# Patient Record
Sex: FEMALE | Race: WHITE | NOT HISPANIC OR LATINO | Employment: OTHER | URBAN - METROPOLITAN AREA
[De-identification: names, ages, dates, MRNs, and addresses within clinical notes are randomized per-mention and may not be internally consistent; named-entity substitution may affect disease eponyms.]

---

## 2019-01-18 ENCOUNTER — INITIAL CONSULT (OUTPATIENT)
Dept: SURGERY | Facility: CLINIC | Age: 69
End: 2019-01-18
Payer: COMMERCIAL

## 2019-01-18 ENCOUNTER — LAB VISIT (OUTPATIENT)
Dept: LAB | Facility: HOSPITAL | Age: 69
End: 2019-01-18
Attending: SURGERY
Payer: COMMERCIAL

## 2019-01-18 VITALS
DIASTOLIC BLOOD PRESSURE: 85 MMHG | BODY MASS INDEX: 33.25 KG/M2 | TEMPERATURE: 98 F | SYSTOLIC BLOOD PRESSURE: 184 MMHG | HEIGHT: 63 IN | HEART RATE: 80 BPM | WEIGHT: 187.63 LBS

## 2019-01-18 DIAGNOSIS — C25.9 MALIGNANT NEOPLASM OF PANCREAS, UNSPECIFIED LOCATION OF MALIGNANCY: ICD-10-CM

## 2019-01-18 DIAGNOSIS — K86.89 MASS OF PANCREAS: ICD-10-CM

## 2019-01-18 DIAGNOSIS — I10 ESSENTIAL HYPERTENSION: ICD-10-CM

## 2019-01-18 DIAGNOSIS — C25.9 MALIGNANT NEOPLASM OF PANCREAS, UNSPECIFIED LOCATION OF MALIGNANCY: Primary | ICD-10-CM

## 2019-01-18 LAB
ALBUMIN SERPL BCP-MCNC: 2.4 G/DL
ALP SERPL-CCNC: 608 U/L
ALT SERPL W/O P-5'-P-CCNC: 139 U/L
ANION GAP SERPL CALC-SCNC: 11 MMOL/L
AST SERPL-CCNC: 129 U/L
BASOPHILS # BLD AUTO: 0.07 K/UL
BASOPHILS NFR BLD: 0.9 %
BILIRUB SERPL-MCNC: 12.8 MG/DL
BUN SERPL-MCNC: 10 MG/DL
CALCIUM SERPL-MCNC: 9.6 MG/DL
CANCER AG19-9 SERPL-ACNC: ABNORMAL U/ML
CHLORIDE SERPL-SCNC: 98 MMOL/L
CO2 SERPL-SCNC: 30 MMOL/L
CREAT SERPL-MCNC: 0.8 MG/DL
DIFFERENTIAL METHOD: ABNORMAL
EOSINOPHIL # BLD AUTO: 0.2 K/UL
EOSINOPHIL NFR BLD: 3.1 %
ERYTHROCYTE [DISTWIDTH] IN BLOOD BY AUTOMATED COUNT: 19.9 %
EST. GFR  (AFRICAN AMERICAN): >60 ML/MIN/1.73 M^2
EST. GFR  (NON AFRICAN AMERICAN): >60 ML/MIN/1.73 M^2
GLUCOSE SERPL-MCNC: 116 MG/DL
HCT VFR BLD AUTO: 29.3 %
HGB BLD-MCNC: 9.4 G/DL
IMM GRANULOCYTES # BLD AUTO: 0.06 K/UL
IMM GRANULOCYTES NFR BLD AUTO: 0.8 %
LYMPHOCYTES # BLD AUTO: 1.1 K/UL
LYMPHOCYTES NFR BLD: 14.5 %
MCH RBC QN AUTO: 25.9 PG
MCHC RBC AUTO-ENTMCNC: 32.1 G/DL
MCV RBC AUTO: 81 FL
MONOCYTES # BLD AUTO: 0.5 K/UL
MONOCYTES NFR BLD: 6.8 %
NEUTROPHILS # BLD AUTO: 5.6 K/UL
NEUTROPHILS NFR BLD: 73.9 %
NRBC BLD-RTO: 0 /100 WBC
PLATELET # BLD AUTO: 274 K/UL
PMV BLD AUTO: 13 FL
POTASSIUM SERPL-SCNC: 3.8 MMOL/L
PREALB SERPL-MCNC: 12 MG/DL
PROT SERPL-MCNC: 6.4 G/DL
RBC # BLD AUTO: 3.63 M/UL
SODIUM SERPL-SCNC: 139 MMOL/L
WBC # BLD AUTO: 7.54 K/UL

## 2019-01-18 PROCEDURE — 99203 PR OFFICE/OUTPT VISIT, NEW, LEVL III, 30-44 MIN: ICD-10-PCS | Mod: S$GLB,,, | Performed by: NURSE PRACTITIONER

## 2019-01-18 PROCEDURE — 80053 COMPREHEN METABOLIC PANEL: CPT

## 2019-01-18 PROCEDURE — 99999 PR PBB SHADOW E&M-EST. PATIENT-LVL III: CPT | Mod: PBBFAC,,, | Performed by: NURSE PRACTITIONER

## 2019-01-18 PROCEDURE — 86301 IMMUNOASSAY TUMOR CA 19-9: CPT

## 2019-01-18 PROCEDURE — 36415 COLL VENOUS BLD VENIPUNCTURE: CPT

## 2019-01-18 PROCEDURE — 85025 COMPLETE CBC W/AUTO DIFF WBC: CPT

## 2019-01-18 PROCEDURE — 99203 OFFICE O/P NEW LOW 30 MIN: CPT | Mod: S$GLB,,, | Performed by: NURSE PRACTITIONER

## 2019-01-18 PROCEDURE — 99999 PR PBB SHADOW E&M-EST. PATIENT-LVL III: ICD-10-PCS | Mod: PBBFAC,,, | Performed by: NURSE PRACTITIONER

## 2019-01-18 PROCEDURE — 84134 ASSAY OF PREALBUMIN: CPT

## 2019-01-18 RX ORDER — PANTOPRAZOLE SODIUM 40 MG/1
40 TABLET, DELAYED RELEASE ORAL DAILY
Refills: 1 | COMMUNITY
Start: 2019-01-16 | End: 2019-04-15 | Stop reason: SDUPTHER

## 2019-01-18 RX ORDER — IRBESARTAN 150 MG/1
150 TABLET ORAL NIGHTLY
Refills: 0 | COMMUNITY
Start: 2019-01-16 | End: 2019-03-21

## 2019-01-18 RX ORDER — ONDANSETRON 8 MG/1
8 TABLET, ORALLY DISINTEGRATING ORAL EVERY 6 HOURS PRN
Refills: 0 | COMMUNITY
Start: 2019-01-16 | End: 2019-02-04

## 2019-01-18 RX ORDER — AMOXICILLIN 250 MG/5ML
POWDER, FOR SUSPENSION ORAL
Refills: 1 | Status: ON HOLD | COMMUNITY
Start: 2019-01-16 | End: 2019-02-02 | Stop reason: HOSPADM

## 2019-01-18 RX ORDER — OXYCODONE AND ACETAMINOPHEN 10; 325 MG/1; MG/1
1 TABLET ORAL EVERY 6 HOURS PRN
Refills: 0 | COMMUNITY
Start: 2019-01-16 | End: 2019-01-21 | Stop reason: SDUPTHER

## 2019-01-18 RX ORDER — HYDRALAZINE HYDROCHLORIDE 50 MG/1
75 TABLET, FILM COATED ORAL 3 TIMES DAILY
Refills: 1 | Status: ON HOLD | COMMUNITY
Start: 2018-11-05 | End: 2019-05-22 | Stop reason: SDUPTHER

## 2019-01-18 RX ORDER — BISOPROLOL FUMARATE AND HYDROCHLOROTHIAZIDE 10; 6.25 MG/1; MG/1
1 TABLET ORAL 2 TIMES DAILY
Refills: 1 | Status: ON HOLD | COMMUNITY
Start: 2018-12-17 | End: 2019-05-22 | Stop reason: SDUPTHER

## 2019-01-18 RX ORDER — OMEPRAZOLE 20 MG/1
20 CAPSULE, DELAYED RELEASE ORAL DAILY
Refills: 1 | COMMUNITY
Start: 2018-12-23 | End: 2019-01-18 | Stop reason: SDUPTHER

## 2019-01-18 NOTE — PROGRESS NOTES
Patient seen with JERRY De La O. History obtained. We have no outside records. Mrs Hanley brings a CT scan on disc with her, dated 1/14/19. This is a non-IV contrasted study which provides only limited information. There does appear to be a mass of the head of the pancreas. Also evident is a small right pleural effusion, and a solitary low density lesion, approx 1.5 cm) in the right liver of uncertain clinical significance. The pancreatic head mass probably involves the SMA at the first jejunal branch, and almost certainly involves the SMV, although the lack of contrast limits the interpretation.   Mrs Hanley pain history also strongly suggests a locally advanced pancreatic neoplasm.   Long talk with patient and her daughter and son-in-law. I have recommended:  --labs today to include renal function---can we proceed with contrasted PTP CT?  --if so, will obtain adequate CT. If the tumor does appear to be locally advanced, will need EUS with biopsy, and ERC with biliary stent, followed by consult with Hem-Onc and Rad Onc  --I've encouraged her to take her pain meds (Percocet 10/325) more regularly and more often (q 4 hrs) to obtain better pain relief

## 2019-01-18 NOTE — PROGRESS NOTES
"  Encounter Date:  2019    Patient ID: Roopa Hanley  Age:  68 y.o. :  1950     Chief Complaint   Patient presents with    Consult     History:    Ms. Hanley is a 68 y.o. female who presents with head of pancreas mass.  Arrives with daughter, Erika, and son in law.    Referred by: self    C/o R flank/ back pain for "long time" >1 year, describes as constant, achy, progressively worsening - not able to do activities as before like gardening, disrupting sleep, taking Aleve q12 hours for past 3 months with minimal relief   Lost 15 # in 2018 r/t nausea, decreased appetite, pain in abd.   Noticed change in stool to light color/ chalky/ floats and urine changed to dark color.   Scheduled routine colonoscopy in 2019, suffered complications "perforated the ulcer", and admitted to [facility] x 9 days. CT scan done with oral contrast via NG tube r/t poor renal function. She was discharged 19. She is taking Percocot 10/325mg q4-6 hours with some relief since home from hospital.     We do not have medical records from [facility] today but she does have disc with outside CT scans.   Plans for pancreatic mass/ bx/ nerve block with Dr. Duncan for 19.     Diet: soft foods only (soup, baked potato)  Reports unintentional wt loss, early satiety, RUQ / back pain    Data:     Radiology:  I personally reviewed these images with Dr. Simpson  19: CT scan non IV contrast  - noted R pulm effusion  - noted ~ 1.5cm lesion to R hepatic lobe of unknown significance  - mass in head of pancreas may have SMV and SMA involvement.     Current Outpatient Medications:     amoxicillin (AMOXIL) 250 mg/5 mL suspension, TAKE 2 TEASPOONSFUL BY MOUTH 3 TIMES A DAY FOR 7 DAYS, Disp: , Rfl: 1    irbesartan (AVAPRO) 150 MG tablet, Take 150 mg by mouth every evening., Disp: , Rfl: 0    oxyCODONE-acetaminophen (PERCOCET)  mg per tablet, Take 1 tablet by mouth every 6 (six) hours as needed., Disp: , Rfl: " "0    pantoprazole (PROTONIX) 40 MG tablet, Take 40 mg by mouth once daily., Disp: , Rfl: 1    bisoprolol-hydrochlorothiazide (ZIAC) 10-6.25 mg per tablet, Take 1 tablet by mouth 2 (two) times daily., Disp: , Rfl: 1    hydrALAZINE (APRESOLINE) 50 MG tablet, Take 75 mg by mouth 3 (three) times daily., Disp: , Rfl: 1    ondansetron (ZOFRAN-ODT) 8 MG TbDL, 8 mg every 6 (six) hours as needed., Disp: , Rfl: 0    Review of patient's allergies indicates:  No Known Allergies    Family History:  Her family history includes Cancer in her cousin, paternal aunt, and paternal uncle; Heart disease in her mother; Stroke in her father.     Social History:  She reports that  has never smoked. She does not have any smokeless tobacco history on file. She reports that she does not drink alcohol or use drugs.     ROS:     Review of Systems   Constitutional: Positive for activity change, appetite change and unexpected weight change.   HENT: Negative for trouble swallowing.    Eyes:        Jaundiced   Respiratory: Negative for cough and shortness of breath.    Cardiovascular: Negative for chest pain and leg swelling.   Gastrointestinal: Positive for abdominal distention, abdominal pain and nausea. Negative for diarrhea and vomiting.   Genitourinary: Negative for difficulty urinating.   Musculoskeletal: Positive for back pain.   Neurological: Negative for numbness.   Psychiatric/Behavioral: Positive for sleep disturbance.     Pertinent positive/negatives detailed in HPI, all other systems negative.     Physical Exam:  BP (!) 184/85 (BP Location: Right arm)   Pulse 80   Temp 97.5 °F (36.4 °C)   Ht 5' 3" (1.6 m)   Wt 85.1 kg (187 lb 9.8 oz)   BMI 33.23 kg/m²     Physical Exam  Constitutional: pleasant, appears uncomfortable - shifting positions, brought pillow for her back to sit in chair.   Performance status: ECOG 0  Eyes:  Sclerae jaundiced, gaze symmetrical  Neck:  Trachea midline, thyroid, non enlarged without palpable nodules,  " FROM  Resp:  Even and unlabored resp, CTA anterior bilaterally  CV:  Regular pulse, S1, S2, no murmurs, no rubs, no edema  Abd:  Soft, + RUQ/ R flank tenderness, no masses, no hepatosplenomegaly, no ascites, no superficial varices  Lymphatics:  No cervical, supraclavicular, lymphadenopathy  Musculoskeletal:  Ambulatory, normal gait, no muscle wasting  Neuro:  No gross deficits  Psych:  Awake, alert, oriented.  Answers and asks questions appropriately      ASSESSMENT and PLAN:    ICD-10-CM    1. Mass of pancreas K86.9 Labs today. If creatinine acceptable, plan for pancreas protocol CT of chest/ abd/pelvis.   Dr. Simpson discussed preliminary read on non IV contrast CT scan dated 1/14/19. Anticipate may be borderline resectable or locally advanced pancreatic cancer given her pain history.   If she and her family decide to transfer care to [facility], plan for ERCP with stent placement plus EUS with bx.    Dr. Simpson discussed options of chemoXT as neoadj tx first.   Consider referral to med onc/ rad onc in future.    2. Essential hypertension I10 Rx changed during hospital stay at [facility]. Converted to ARB. Suboptimal BP reading today. Recommend f/u with PCP.      Pt seen today with Dr. Simpson.     Jennifer Piña NP  Surgical Oncology  [facility] [place], [place]  Office: [phone]  Fax: [phone]           Roopa Hanley 1950

## 2019-01-21 ENCOUNTER — HOSPITAL ENCOUNTER (OUTPATIENT)
Dept: RADIOLOGY | Facility: HOSPITAL | Age: 69
Discharge: HOME | End: 2019-01-21
Attending: SURGERY
Payer: COMMERCIAL

## 2019-01-21 ENCOUNTER — PATIENT MESSAGE (OUTPATIENT)
Dept: SURGERY | Facility: CLINIC | Age: 69
End: 2019-01-21

## 2019-01-21 ENCOUNTER — TELEPHONE (OUTPATIENT)
Dept: SURGERY | Facility: CLINIC | Age: 69
End: 2019-01-21

## 2019-01-21 DIAGNOSIS — C25.9 MALIGNANT NEOPLASM OF PANCREAS, UNSPECIFIED LOCATION OF MALIGNANCY: ICD-10-CM

## 2019-01-21 PROCEDURE — 74177 CT ABD & PELVIS W/CONTRAST: CPT | Mod: TC

## 2019-01-21 PROCEDURE — 71260 CT CHEST ABDOMEN PELVIS WITH CONTRAST (XPD): ICD-10-PCS | Mod: 26,,, | Performed by: RADIOLOGY

## 2019-01-21 PROCEDURE — 71260 CT THORAX DX C+: CPT | Mod: 26,,, | Performed by: RADIOLOGY

## 2019-01-21 PROCEDURE — 25500020 PHARM REV CODE 255: Performed by: SURGERY

## 2019-01-21 PROCEDURE — 74177 CT ABD & PELVIS W/CONTRAST: CPT | Mod: 26,,, | Performed by: RADIOLOGY

## 2019-01-21 PROCEDURE — 74177 CT CHEST ABDOMEN PELVIS WITH CONTRAST (XPD): ICD-10-PCS | Mod: 26,,, | Performed by: RADIOLOGY

## 2019-01-21 RX ORDER — OXYCODONE AND ACETAMINOPHEN 10; 325 MG/1; MG/1
1 TABLET ORAL EVERY 6 HOURS PRN
Qty: 60 TABLET | Refills: 0 | Status: ON HOLD | OUTPATIENT
Start: 2019-01-21 | End: 2019-02-01 | Stop reason: SDUPTHER

## 2019-01-21 RX ADMIN — IOHEXOL 100 ML: 350 INJECTION, SOLUTION INTRAVENOUS at 03:01

## 2019-01-21 NOTE — TELEPHONE ENCOUNTER
----- Message from Rachel Martinez sent at 1/21/2019  9:46 AM CST -----  Contact: Patient's daughter   Needs Advice    Reason for call: Caller states Jimena instructed them to call when the patient needed more medication, caller states she would like to speak with her        Communication Preference: [phone] or [phone]    Additional Information: please contact the caller to consult them on this matter. They will be on campus for a CT scan today at 130

## 2019-01-22 ENCOUNTER — TREATMENT PLANNING (OUTPATIENT)
Dept: SURGERY | Facility: CLINIC | Age: 69
End: 2019-01-22

## 2019-01-22 ENCOUNTER — TELEPHONE (OUTPATIENT)
Dept: ENDOSCOPY | Facility: HOSPITAL | Age: 69
End: 2019-01-22

## 2019-01-22 DIAGNOSIS — R79.89 ABNORMAL BILIRUBIN TEST: Primary | ICD-10-CM

## 2019-01-22 NOTE — PROGRESS NOTES
PTP CT done 1/2/1/19 personally reviewed. The SMV is encased and nearly occluded over a length of approx 3.5 cm. The SMA is also abutted around at least 180 degrees of its circumference (image # 155 on the arterial series) at the takeoff of one of the mesenteric branches. The tumor meets criteria for locally advanced. Will set up EUS with biopsy, ERC with biliary stent placement, and consult with Med Onc.

## 2019-01-23 ENCOUNTER — TELEPHONE (OUTPATIENT)
Dept: ENDOSCOPY | Facility: HOSPITAL | Age: 69
End: 2019-01-23

## 2019-01-23 RX ORDER — LORAZEPAM 1 MG/1
1 TABLET ORAL EVERY 8 HOURS PRN
Status: ON HOLD | COMMUNITY
End: 2019-05-22 | Stop reason: SDUPTHER

## 2019-01-23 NOTE — TELEPHONE ENCOUNTER
Called to confirm appointment for EGD/EUS/ERCP on 1/25/19 at 11 am. She provided permission to speak with daughter (Erika). I reviewed her medical history, medications and prep instructions. She voiced an understanding asked for instructions to be sent to e-mail address listed in chart. Erika has my contact information to call if needed.

## 2019-01-23 NOTE — TELEPHONE ENCOUNTER
Spoke with patient. EGD/EUS/ERCP scheduled for 1/25 at 11a. Reviewed prep instructions. Ms Hagan verbalized understanding.

## 2019-01-23 NOTE — TELEPHONE ENCOUNTER
----- Message from Rashawn Martinez MD sent at 1/22/2019  4:22 PM CST -----  Noris- I spoke to Dr. Simpson. We are OK to proceed. Would start with EGD scope to make sure things look OK. Then EUS linear, then ERCP.      ----- Message -----  From: Daija Travis MA  Sent: 1/22/2019   9:15 AM  To: Rashawn Martinez MD    Please review and advise  ----- Message -----  From: Carla Mcgrath RN  Sent: 1/22/2019   9:01 AM  To: BOWEN Marinelli Dr saw this patient on Friday, 1/18.    She needs an EUS with biopsy, ERCP and stent.    I scanned in records from .    She had an EGD and c-scope and spent several days in hospital due to perforation.    We also did new CT yesterday.    Thanks,  Jimena

## 2019-01-25 ENCOUNTER — ANESTHESIA (OUTPATIENT)
Dept: ENDOSCOPY | Facility: HOSPITAL | Age: 69
End: 2019-01-25
Payer: COMMERCIAL

## 2019-01-25 ENCOUNTER — HOSPITAL ENCOUNTER (OUTPATIENT)
Facility: HOSPITAL | Age: 69
Discharge: HOME | End: 2019-01-25
Attending: INTERNAL MEDICINE | Admitting: INTERNAL MEDICINE
Payer: COMMERCIAL

## 2019-01-25 ENCOUNTER — ANESTHESIA EVENT (OUTPATIENT)
Dept: ENDOSCOPY | Facility: HOSPITAL | Age: 69
End: 2019-01-25
Payer: COMMERCIAL

## 2019-01-25 VITALS
SYSTOLIC BLOOD PRESSURE: 115 MMHG | TEMPERATURE: 99 F | WEIGHT: 180 LBS | OXYGEN SATURATION: 97 % | DIASTOLIC BLOOD PRESSURE: 49 MMHG | HEIGHT: 63 IN | BODY MASS INDEX: 31.89 KG/M2 | HEART RATE: 74 BPM | RESPIRATION RATE: 16 BRPM

## 2019-01-25 DIAGNOSIS — K83.1 BILIARY OBSTRUCTION: ICD-10-CM

## 2019-01-25 DIAGNOSIS — K86.89 MASS OF PANCREAS: Primary | ICD-10-CM

## 2019-01-25 PROCEDURE — 88305 TISSUE EXAM BY PATHOLOGIST: CPT | Performed by: PATHOLOGY

## 2019-01-25 PROCEDURE — D9220A PRA ANESTHESIA: ICD-10-PCS | Mod: ANES,,, | Performed by: ANESTHESIOLOGY

## 2019-01-25 PROCEDURE — D9220A PRA ANESTHESIA: Mod: ANES,,, | Performed by: ANESTHESIOLOGY

## 2019-01-25 PROCEDURE — 25000003 PHARM REV CODE 250: Performed by: INTERNAL MEDICINE

## 2019-01-25 PROCEDURE — 88172 CYTOLOGY SPECIMEN- FNA RADIOLOGY GUIDED, BRONCH/EBUS, EUS/GI: ICD-10-PCS | Mod: 26,,, | Performed by: PATHOLOGY

## 2019-01-25 PROCEDURE — 37000009 HC ANESTHESIA EA ADD 15 MINS: Performed by: INTERNAL MEDICINE

## 2019-01-25 PROCEDURE — 63600175 PHARM REV CODE 636 W HCPCS: Performed by: ANESTHESIOLOGY

## 2019-01-25 PROCEDURE — 43259 PR ENDOSCOPIC ULTRASOUND EXAM: ICD-10-PCS | Mod: ,,, | Performed by: INTERNAL MEDICINE

## 2019-01-25 PROCEDURE — 27202131 HC NEEDLE, FNB SINGLE (ANY): Performed by: INTERNAL MEDICINE

## 2019-01-25 PROCEDURE — 88172 CYTP DX EVAL FNA 1ST EA SITE: CPT | Mod: 26,,, | Performed by: PATHOLOGY

## 2019-01-25 PROCEDURE — 88173 CYTOLOGY SPECIMEN- FNA RADIOLOGY GUIDED, BRONCH/EBUS, EUS/GI: ICD-10-PCS | Mod: 26,,, | Performed by: PATHOLOGY

## 2019-01-25 PROCEDURE — 88305 CYTOLOGY SPECIMEN- FNA RADIOLOGY GUIDED, BRONCH/EBUS, EUS/GI: ICD-10-PCS | Mod: 26,,, | Performed by: PATHOLOGY

## 2019-01-25 PROCEDURE — 43259 EGD US EXAM DUODENUM/JEJUNUM: CPT | Performed by: INTERNAL MEDICINE

## 2019-01-25 PROCEDURE — 63600175 PHARM REV CODE 636 W HCPCS: Performed by: NURSE ANESTHETIST, CERTIFIED REGISTERED

## 2019-01-25 PROCEDURE — D9220A PRA ANESTHESIA: Mod: CRNA,,, | Performed by: NURSE ANESTHETIST, CERTIFIED REGISTERED

## 2019-01-25 PROCEDURE — 37000008 HC ANESTHESIA 1ST 15 MINUTES: Performed by: INTERNAL MEDICINE

## 2019-01-25 PROCEDURE — D9220A PRA ANESTHESIA: ICD-10-PCS | Mod: CRNA,,, | Performed by: NURSE ANESTHETIST, CERTIFIED REGISTERED

## 2019-01-25 PROCEDURE — 43259 EGD US EXAM DUODENUM/JEJUNUM: CPT | Mod: ,,, | Performed by: INTERNAL MEDICINE

## 2019-01-25 PROCEDURE — 88173 CYTOPATH EVAL FNA REPORT: CPT | Mod: 26,,, | Performed by: PATHOLOGY

## 2019-01-25 RX ORDER — ONDANSETRON 2 MG/ML
INJECTION INTRAMUSCULAR; INTRAVENOUS
Status: DISCONTINUED | OUTPATIENT
Start: 2019-01-25 | End: 2019-01-25

## 2019-01-25 RX ORDER — ONDANSETRON 2 MG/ML
4 INJECTION INTRAMUSCULAR; INTRAVENOUS ONCE
Status: COMPLETED | OUTPATIENT
Start: 2019-01-25 | End: 2019-01-25

## 2019-01-25 RX ORDER — PROPOFOL 10 MG/ML
VIAL (ML) INTRAVENOUS
Status: DISCONTINUED | OUTPATIENT
Start: 2019-01-25 | End: 2019-01-25

## 2019-01-25 RX ORDER — SODIUM CHLORIDE 9 MG/ML
INJECTION, SOLUTION INTRAVENOUS CONTINUOUS
Status: DISCONTINUED | OUTPATIENT
Start: 2019-01-25 | End: 2019-01-25 | Stop reason: HOSPADM

## 2019-01-25 RX ORDER — FENTANYL CITRATE 50 UG/ML
INJECTION, SOLUTION INTRAMUSCULAR; INTRAVENOUS
Status: DISCONTINUED | OUTPATIENT
Start: 2019-01-25 | End: 2019-01-25

## 2019-01-25 RX ORDER — SODIUM CHLORIDE 0.9 % (FLUSH) 0.9 %
3 SYRINGE (ML) INJECTION
Status: DISCONTINUED | OUTPATIENT
Start: 2019-01-25 | End: 2019-01-25 | Stop reason: HOSPADM

## 2019-01-25 RX ORDER — PROPOFOL 10 MG/ML
VIAL (ML) INTRAVENOUS CONTINUOUS PRN
Status: DISCONTINUED | OUTPATIENT
Start: 2019-01-25 | End: 2019-01-25

## 2019-01-25 RX ORDER — LIDOCAINE HCL/PF 100 MG/5ML
SYRINGE (ML) INTRAVENOUS
Status: DISCONTINUED | OUTPATIENT
Start: 2019-01-25 | End: 2019-01-25

## 2019-01-25 RX ADMIN — ONDANSETRON 4 MG: 2 INJECTION INTRAMUSCULAR; INTRAVENOUS at 11:01

## 2019-01-25 RX ADMIN — SODIUM CHLORIDE: 0.9 INJECTION, SOLUTION INTRAVENOUS at 10:01

## 2019-01-25 RX ADMIN — LIDOCAINE HYDROCHLORIDE 60 MG: 20 INJECTION, SOLUTION INTRAVENOUS at 10:01

## 2019-01-25 RX ADMIN — PROPOFOL 20 MG: 10 INJECTION, EMULSION INTRAVENOUS at 11:01

## 2019-01-25 RX ADMIN — PROPOFOL 100 MG: 10 INJECTION, EMULSION INTRAVENOUS at 10:01

## 2019-01-25 RX ADMIN — FENTANYL CITRATE 50 MCG: 50 INJECTION, SOLUTION INTRAMUSCULAR; INTRAVENOUS at 11:01

## 2019-01-25 RX ADMIN — ONDANSETRON 4 MG: 2 INJECTION INTRAMUSCULAR; INTRAVENOUS at 12:01

## 2019-01-25 RX ADMIN — PROPOFOL 150 MCG/KG/MIN: 10 INJECTION, EMULSION INTRAVENOUS at 11:01

## 2019-01-25 RX ADMIN — PROPOFOL 30 MG: 10 INJECTION, EMULSION INTRAVENOUS at 11:01

## 2019-01-25 NOTE — ANESTHESIA POSTPROCEDURE EVALUATION
"Anesthesia Post Evaluation    Patient: Roopa Hanley    Procedure(s) Performed: Procedure(s) (LRB):  EGD (ESOPHAGOGASTRODUODENOSCOPY) (N/A)  ULTRASOUND, UPPER GI TRACT, ENDOSCOPIC (N/A)  ERCP (ENDOSCOPIC RETROGRADE CHOLANGIOPANCREATOGRAPHY) (N/A)    Final Anesthesia Type: general  Patient location during evaluation: PACU  Patient participation: Yes- Able to Participate  Level of consciousness: awake and alert  Post-procedure vital signs: reviewed and stable  Pain management: adequate  Airway patency: patent  PONV status at discharge: No PONV  Anesthetic complications: no      Cardiovascular status: blood pressure returned to baseline and stable  Respiratory status: unassisted  Hydration status: euvolemic  Follow-up not needed.        Visit Vitals  BP (!) 115/49   Pulse 74   Temp 37 °C (98.6 °F) (Skin)   Resp 16   Ht 5' 3" (1.6 m)   Wt 81.6 kg (180 lb)   SpO2 97%   Breastfeeding? No   BMI 31.89 kg/m²       Pain/Aline Score: No Data Recorded      "

## 2019-01-25 NOTE — ANESTHESIA RELEASE NOTE
"Anesthesia Release from PACU Note    Patient: Roopa Hanley    Procedure(s) Performed: Procedure(s) (LRB):  EGD (ESOPHAGOGASTRODUODENOSCOPY) (N/A)  ULTRASOUND, UPPER GI TRACT, ENDOSCOPIC (N/A)  ERCP (ENDOSCOPIC RETROGRADE CHOLANGIOPANCREATOGRAPHY) (N/A)    Anesthesia type: GEN    Post pain: Adequate analgesia reported    Post assessment: no apparent anesthetic complications, tolerated procedure well and no evidence of recall    Post vital signs: BP (!) 115/49   Pulse 74   Temp 37 °C (98.6 °F) (Skin)   Resp 16   Ht 5' 3" (1.6 m)   Wt 81.6 kg (180 lb)   SpO2 97%   Breastfeeding? No   BMI 31.89 kg/m²     Level of consciousness: awake, alert and oriented    Nausea/Vomiting: no nausea/no vomiting    Complications: none    Airway Patency: patent    Respiratory: unassisted, spontaneous ventilation, room air    Cardiovascular: stable and blood pressure at baseline    Hydration: euvolemic    "

## 2019-01-25 NOTE — [employer] PATIENT INSTRUCTIONS
Discharge Summary/Instructions after an Endoscopic Procedure  Patient Name: Roopa Hanley  Patient MRN: [id number]  Patient YOB: 1950 Friday, January 25, 2019  Rashawn Martinez MD  RESTRICTIONS:  During your procedure today, you received medications for sedation.  These   medications may affect your judgment, balance and coordination.  Therefore,   for 24 hours, you have the following restrictions:   - DO NOT drive a car, operate machinery, make legal/financial decisions,   sign important papers or drink alcohol.    ACTIVITY:  Today: no heavy lifting, straining or running due to procedural   sedation/anesthesia.  The following day: return to full activity including work.  DIET:  Eat and drink normally unless instructed otherwise.     TREATMENT FOR COMMON SIDE EFFECTS:  - Mild abdominal pain, nausea, belching, bloating or excessive gas:  rest,   eat lightly and use a heating pad.  - Sore Throat: treat with throat lozenges and/or gargle with warm salt   water.  - Because air was used during the procedure, expelling large amounts of air   from your rectum or belching is normal.  - If a bowel prep was taken, you may not have a bowel movement for 1-3 days.    This is normal.  SYMPTOMS TO WATCH FOR AND REPORT TO YOUR PHYSICIAN:  1. Abdominal pain or bloating, other than gas cramps.  2. Chest pain.  3. Back pain.  4. Signs of infection such as: chills or fever occurring within 24 hours   after the procedure.  5. Rectal bleeding, which would show as bright red, maroon, or black stools.   (A tablespoon of blood from the rectum is not serious, especially if   hemorrhoids are present.)  6. Vomiting.  7. Weakness or dizziness.  GO DIRECTLY TO THE NEAREST EMERGENCY ROOM IF YOU HAVE ANY OF THE FOLLOWING:      Difficulty breathing              Chills and/or fever over 101 F   Persistent vomiting and/or vomiting blood   Severe abdominal pain   Severe chest pain   Black, tarry stools   Bleeding- more than one  tablespoon   Any other symptom or condition that you feel may need urgent attention  Your doctor recommends these additional instructions:  If any biopsies were taken, your doctors clinic will contact you in 1 to 2   weeks with any results.  - Discharge patient to home (ambulatory).   - Resume previous diet; Discharge to home (ambulatory); Resume outpatient   medications  - Await cytology results.   - Return to referring physician as previously scheduled.   - Pending final cytology results, will need to consider: (1) EUS-guided   biliary drainage (ERCP scope will not pass) and possible duodenal stent, vs   (2) IR drainage. Will discuss with patient and Dr. Simpson.  For questions, problems or results please call your physician - Rashawn Martinez MD at Work:  [phone].  [facility], EMERGENCY ROOM PHONE NUMBER: [phone]  IF A COMPLICATION OR EMERGENCY SITUATION ARISES AND YOU ARE UNABLE TO REACH   YOUR PHYSICIAN - GO DIRECTLY TO THE EMERGENCY ROOM.  Rashawn Martinez MD  1/25/2019 12:10:36 PM  This report has been verified and signed electronically.  [employer]

## 2019-01-25 NOTE — ANESTHESIA PREPROCEDURE EVALUATION
01/25/2019  Roopa Hanley is a 68 y.o., female.    Anesthesia Evaluation         Review of Systems  Anesthesia Hx:  No problems with previous Anesthesia   Social:  Non-Smoker, No Alcohol Use    Hematology/Oncology:        Oncology Comments: Mass of pancreas   Cardiovascular:   Hypertension    Pulmonary:  Pulmonary Normal    Hepatic/GI:   Mass of pancreas,  Biliary obstruction       Physical Exam  General:  Obesity    Airway/Jaw/Neck:  Airway Findings: Mouth Opening: Normal Tongue: Normal  General Airway Assessment: Adult            Mental Status:  Mental Status Findings:  Alert and Oriented, Cooperative         Anesthesia Plan  Type of Anesthesia, risks & benefits discussed:  Anesthesia Type:  general  Patient's Preference:   Intra-op Monitoring Plan: standard ASA monitors  Intra-op Monitoring Plan Comments:   Post Op Pain Control Plan:   Post Op Pain Control Plan Comments:   Induction:   IV  Beta Blocker:    Patient is on a Beta-Blocker and is not required to be redosed perioperatively for the following medical conditions:     Informed Consent: Patient understands risks and agrees with Anesthesia plan.  Questions answered. Anesthesia consent signed with patient.  ASA Score: 2     Day of Surgery Review of History & Physical:            Ready For Surgery From Anesthesia Perspective.

## 2019-01-25 NOTE — H&P
Short Stay Endoscopy History and Physical    PCP - Maikel Cesar MD  Referring Physician - Celestino Simpson MD  [address] [place] [address]    Procedure - EUS/ERCP  ASA - per anesthesia  Mallampati - per anesthesia  History of Anesthesia problems - no  Family history Anesthesia problems -  no   Plan of anesthesia - General    HPI:  This is a 68 y.o. female here for evaluation of: obstructive jaundice    Reflux - no  Dysphagia - no  Abdominal pain - no  Diarrhea - no    ROS:  Constitutional: No fevers, chills, No weight loss  CV: No chest pain  Pulm: No cough, No shortness of breath  Ophtho: No vision changes  GI: see HPI  Derm: No rash    Medical History:  has a past medical history of Hypertension.    Surgical History:  has a past surgical history that includes Hysterectomy and Foot surgery (Right, 2012).    Family History: family history includes Cancer in her cousin, paternal aunt, and paternal uncle; Heart disease in her mother; Stroke in her father..    Social History:  reports that  has never smoked. she has never used smokeless tobacco. She reports that she does not drink alcohol or use drugs.    Review of patient's allergies indicates:  No Known Allergies    Medications:   Medications Prior to Admission   Medication Sig Dispense Refill Last Dose    bisoprolol-hydrochlorothiazide (ZIAC) 10-6.25 mg per tablet Take 1 tablet by mouth 2 (two) times daily.  1 1/25/2019 at Unknown time    hydrALAZINE (APRESOLINE) 50 MG tablet Take 75 mg by mouth 3 (three) times daily.  1 1/25/2019 at Unknown time    irbesartan (AVAPRO) 150 MG tablet Take 150 mg by mouth every evening.  0 1/24/2019 at Unknown time    LORazepam (ATIVAN) 1 MG tablet Take 1 mg by mouth every 8 (eight) hours as needed for Anxiety.   1/24/2019 at Unknown time    ondansetron (ZOFRAN-ODT) 8 MG TbDL 8 mg every 6 (six) hours as needed.  0 1/25/2019 at Unknown time    oxyCODONE-acetaminophen (PERCOCET)  mg per tablet Take 1 tablet  by mouth every 6 (six) hours as needed. 60 tablet 0 1/25/2019 at Unknown time    pantoprazole (PROTONIX) 40 MG tablet Take 40 mg by mouth once daily.  1 1/25/2019 at Unknown time    amoxicillin (AMOXIL) 250 mg/5 mL suspension TAKE 2 TEASPOONSFUL BY MOUTH 3 TIMES A DAY FOR 7 DAYS  1 Taking       Physical Exam:    Vital Signs:   Vitals:    01/25/19 1029   BP: 125/81   Pulse: 83   Resp: 16   Temp: 98.2 °F (36.8 °C)       General Appearance: Well appearing in no acute distress  Eyes:    POS scleral icterus  ENT: Neck supple  Lungs: CTA anteriorly  Heart:  Regular rate  Abdomen: Soft, non tender, non distended with normal bowel sounds.  Extremities: No edema  Skin: No rash    Labs:  Lab Results   Component Value Date    WBC 7.54 01/18/2019    HGB 9.4 (L) 01/18/2019    HCT 29.3 (L) 01/18/2019     01/18/2019     (H) 01/18/2019     (H) 01/18/2019     01/18/2019    K 3.8 01/18/2019    CL 98 01/18/2019    CREATININE 0.8 01/18/2019    BUN 10 01/18/2019    CO2 30 (H) 01/18/2019       I have explained the risks and benefits of this endoscopic procedure to the patient including but not limited to bleeding, inflammation, infection, perforation, and death.      Rashawn Martinez MD

## 2019-01-25 NOTE — DISCHARGE INSTRUCTIONS
Upper GI Endoscopy     During endoscopy, a long, flexible tube is used to view the inside of your upper GI tract.      Upper GI endoscopy allows your healthcare provider to look directly into the beginning of your gastrointestinal (GI) tract. The esophagus, stomach, and duodenum (the first part of the small intestine) make up the upper GI tract.   Before the exam  Follow these and any other instructions you are given before your endoscopy. If you dont follow the healthcare providers instructions carefully, the test may need to be canceled or done over:  · Don't eat or drink anything after midnight the night before your exam. If your exam is in the afternoon, drink only clear liquids in the morning. Don't eat or drink anything for 8 hours before the exam. In some cases, you may be able to take medicines with sips of water until 2 hours before the procedure. Speak with your healthcare provider about this.   · Bring your X-rays and any other test results you have.  · Because you will be sedated, arrange for an adult to drive you home after the exam.  · Tell your healthcare provider before the exam if you are taking any medicines or have any medical problems.  The procedure  Here is what to expect:  · You will lie on the endoscopy table. Usually patients lie on the left side.  · You will be monitored and given oxygen.  · Your throat may be numbed with a spray or gargle. You are given medicine through an intravenous (IV) line that will help you relax and remain comfortable. You may be awake or asleep during the procedure.  · The healthcare provider will put the endoscope in your mouth and down your esophagus. It is thinner than most pieces of food that you swallow. It will not affect your breathing. The medicine helps keep you from gagging.  · Air is put into your GI tract to expand it. It can make you burp.  · During the procedure, the healthcare provider can take biopsies (tissue samples), remove abnormalities,  such as polyps, or treat abnormalities through a variety of devices placed through the endoscope. You will not feel this.   · The endoscope carries images of your upper GI tract to a video screen. If you are awake, you may be able to look at the images.  · After the procedure is done, you will rest for a time. An adult must drive you home.  When to call your healthcare provider  Contact your healthcare provider if you have:  · Black or tarry stools, or blood in your stool  · Fever  · Pain in your belly that does not go away  · Nausea and vomiting, or vomiting blood   Date Last Reviewed: 7/1/2016  © 2199-2452 [employer]. [address] [place] [address], [place] [address]. All rights reserved. This information is not intended as a substitute for professional medical care. Always follow your healthcare professional's instructions.      Recovery After Procedural Sedation (Adult)  You have been given medicine by vein to make you sleep during your surgery. This may have included both a pain medicine and sleeping medicine. Most of the effects have worn off. But you may still have some drowsiness for the next 6 to 8 hours.  Home care  Follow these guidelines when you get home:  · For the next 8 hours, you should be watched by a responsible adult. This person should make sure your condition is not getting worse.  · Don't drink any alcohol for the next 24 hours.  · Don't drive, operate dangerous machinery, or make important business or personal decisions during the next 24 hours.  Note: Your healthcare provider may tell you not to take any medicine by mouth for pain or sleep in the next 4 hours. These medicines may react with the medicines you were given in the hospital. This could cause a much stronger response than usual.  Follow-up care  Follow up with your healthcare provider if you are not alert and back to your usual level of activity within 12 hours.  When to seek medical advice  Call your healthcare provider  right away if any of these occur:  · Drowsiness gets worse  · Weakness or dizziness gets worse  · Repeated vomiting  · You can't be awakened   Date Last Reviewed: 10/18/2016  © 6562-3984 [employer]. [address] [place] [address], [place] [address]. All rights reserved. This information is not intended as a substitute for professional medical care. Always follow your healthcare professional's instructions.    PATIENT INSTRUCTIONS  POST-ANESTHESIA    IMMEDIATELY FOLLOWING SURGERY:  Do not drive or operate machinery for the first twenty four hours after surgery.  Do not make any important decisions for twenty four hours after surgery or while taking narcotic pain medications or sedatives.  If you develop intractable nausea and vomiting or a severe headache please notify your doctor immediately.    FOLLOW-UP:  Please make an appointment with your surgeon as instructed. You do not need to follow up with anesthesia unless specifically instructed to do so.    WOUND CARE INSTRUCTIONS (if applicable):  Keep a dry clean dressing on the anesthesia/puncture wound site if there is drainage.  Once the wound has quit draining you may leave it open to air.  Generally you should leave the bandage intact for twenty four hours unless there is drainage.  If the epidural site drains for more than 36-48 hours please call the anesthesia department.    QUESTIONS?:  Please feel free to call your physician or the hospital  if you have any questions, and they will be happy to assist you.       [facility] Anesthesia Department  [address] [facility]  [phone]

## 2019-01-29 ENCOUNTER — TELEPHONE (OUTPATIENT)
Dept: ENDOSCOPY | Facility: HOSPITAL | Age: 69
End: 2019-01-29

## 2019-01-29 ENCOUNTER — PATIENT MESSAGE (OUTPATIENT)
Dept: GASTROENTEROLOGY | Facility: CLINIC | Age: 69
End: 2019-01-29

## 2019-01-29 DIAGNOSIS — K83.1 BILIARY OBSTRUCTION: Primary | ICD-10-CM

## 2019-01-29 NOTE — TELEPHONE ENCOUNTER
----- Message from Rashawn Martinez MD sent at 1/29/2019 11:52 AM CST -----  Celestino- This is the pt with duodenal stenosis and obstructive jaundice. FNA just came back today as positive. Do you want me to go ahead with duodenal stent and EUS-guided choledochoduodenostomy?

## 2019-01-29 NOTE — TELEPHONE ENCOUNTER
Please sign order. I spoke with her daughter and they can come tomorrow. I told her I would get back to her once we hear from Dr Simpson

## 2019-01-30 ENCOUNTER — TELEPHONE (OUTPATIENT)
Dept: ENDOSCOPY | Facility: HOSPITAL | Age: 69
End: 2019-01-30

## 2019-01-30 ENCOUNTER — PATIENT MESSAGE (OUTPATIENT)
Dept: SURGERY | Facility: CLINIC | Age: 69
End: 2019-01-30

## 2019-01-31 ENCOUNTER — TELEPHONE (OUTPATIENT)
Dept: HEMATOLOGY/ONCOLOGY | Facility: CLINIC | Age: 69
End: 2019-01-31

## 2019-01-31 DIAGNOSIS — C25.9 PANCREATIC ADENOCARCINOMA: Primary | ICD-10-CM

## 2019-02-01 ENCOUNTER — HOSPITAL ENCOUNTER (OUTPATIENT)
Facility: HOSPITAL | Age: 69
Discharge: HOME | End: 2019-02-02
Attending: INTERNAL MEDICINE | Admitting: INTERNAL MEDICINE
Payer: COMMERCIAL

## 2019-02-01 ENCOUNTER — ANESTHESIA (OUTPATIENT)
Dept: ENDOSCOPY | Facility: HOSPITAL | Age: 69
End: 2019-02-01
Payer: COMMERCIAL

## 2019-02-01 ENCOUNTER — ANESTHESIA EVENT (OUTPATIENT)
Dept: ENDOSCOPY | Facility: HOSPITAL | Age: 69
End: 2019-02-01
Payer: COMMERCIAL

## 2019-02-01 DIAGNOSIS — K83.1 BILIARY DISEASE WITH OBSTRUCTION: ICD-10-CM

## 2019-02-01 DIAGNOSIS — C25.9 PANCREAS CANCER: ICD-10-CM

## 2019-02-01 DIAGNOSIS — Z98.890: ICD-10-CM

## 2019-02-01 DIAGNOSIS — K31.5 DUODENAL STENOSIS: ICD-10-CM

## 2019-02-01 DIAGNOSIS — R11.10: ICD-10-CM

## 2019-02-01 DIAGNOSIS — I10 ESSENTIAL HYPERTENSION: Primary | ICD-10-CM

## 2019-02-01 DIAGNOSIS — K86.89 MASS OF PANCREAS: ICD-10-CM

## 2019-02-01 DIAGNOSIS — K83.1 BILIARY OBSTRUCTION: ICD-10-CM

## 2019-02-01 LAB
ALBUMIN SERPL BCP-MCNC: 2.1 G/DL
ALP SERPL-CCNC: 648 U/L
ALT SERPL W/O P-5'-P-CCNC: 92 U/L
AMYLASE SERPL-CCNC: 24 U/L
ANION GAP SERPL CALC-SCNC: 12 MMOL/L
AST SERPL-CCNC: 107 U/L
BACTERIA #/AREA URNS AUTO: ABNORMAL /HPF
BASOPHILS # BLD AUTO: 0.11 K/UL
BASOPHILS NFR BLD: 1.3 %
BILIRUB SERPL-MCNC: 13.6 MG/DL
BILIRUB UR QL STRIP: ABNORMAL
BUN SERPL-MCNC: 19 MG/DL
CALCIUM SERPL-MCNC: 9.2 MG/DL
CHLORIDE SERPL-SCNC: 100 MMOL/L
CLARITY UR REFRACT.AUTO: CLEAR
CO2 SERPL-SCNC: 26 MMOL/L
COLOR UR AUTO: ABNORMAL
CREAT SERPL-MCNC: 0.9 MG/DL
DIFFERENTIAL METHOD: ABNORMAL
EOSINOPHIL # BLD AUTO: 1.1 K/UL
EOSINOPHIL NFR BLD: 13.5 %
ERYTHROCYTE [DISTWIDTH] IN BLOOD BY AUTOMATED COUNT: 20.4 %
EST. GFR  (AFRICAN AMERICAN): >60 ML/MIN/1.73 M^2
EST. GFR  (NON AFRICAN AMERICAN): >60 ML/MIN/1.73 M^2
GLUCOSE SERPL-MCNC: 143 MG/DL
GLUCOSE UR QL STRIP: NEGATIVE
HCT VFR BLD AUTO: 32.6 %
HGB BLD-MCNC: 9.9 G/DL
HGB UR QL STRIP: NEGATIVE
HYALINE CASTS UR QL AUTO: 1 /LPF
IMM GRANULOCYTES # BLD AUTO: 0.05 K/UL
IMM GRANULOCYTES NFR BLD AUTO: 0.6 %
KETONES UR QL STRIP: ABNORMAL
LEUKOCYTE ESTERASE UR QL STRIP: ABNORMAL
LIPASE SERPL-CCNC: 34 U/L
LYMPHOCYTES # BLD AUTO: 0.8 K/UL
LYMPHOCYTES NFR BLD: 9.9 %
MCH RBC QN AUTO: 26.2 PG
MCHC RBC AUTO-ENTMCNC: 30.4 G/DL
MCV RBC AUTO: 86 FL
MICROSCOPIC COMMENT: ABNORMAL
MONOCYTES # BLD AUTO: 0.6 K/UL
MONOCYTES NFR BLD: 7 %
NEUTROPHILS # BLD AUTO: 5.6 K/UL
NEUTROPHILS NFR BLD: 67.7 %
NITRITE UR QL STRIP: NEGATIVE
NRBC BLD-RTO: 0 /100 WBC
PH UR STRIP: 5 [PH] (ref 5–8)
PLATELET # BLD AUTO: 281 K/UL
PMV BLD AUTO: 11.8 FL
POTASSIUM SERPL-SCNC: 3.5 MMOL/L
PROT SERPL-MCNC: 6.3 G/DL
PROT UR QL STRIP: ABNORMAL
RBC # BLD AUTO: 3.78 M/UL
RBC #/AREA URNS AUTO: 0 /HPF (ref 0–4)
SODIUM SERPL-SCNC: 138 MMOL/L
SP GR UR STRIP: 1.01 (ref 1–1.03)
SQUAMOUS #/AREA URNS AUTO: 0 /HPF
URN SPEC COLLECT METH UR: ABNORMAL
WBC # BLD AUTO: 8.2 K/UL
WBC #/AREA URNS AUTO: 67 /HPF (ref 0–5)

## 2019-02-01 PROCEDURE — 85025 COMPLETE CBC W/AUTO DIFF WBC: CPT

## 2019-02-01 PROCEDURE — 37000008 HC ANESTHESIA 1ST 15 MINUTES: Performed by: INTERNAL MEDICINE

## 2019-02-01 PROCEDURE — 43242 PR UPGI ENDOSCOPY,FN NEEDLE BX,GUIDED: ICD-10-PCS | Mod: ,,, | Performed by: INTERNAL MEDICINE

## 2019-02-01 PROCEDURE — 25000003 PHARM REV CODE 250: Performed by: NURSE ANESTHETIST, CERTIFIED REGISTERED

## 2019-02-01 PROCEDURE — 27200976 HC DILATOR, BILIARY: Performed by: INTERNAL MEDICINE

## 2019-02-01 PROCEDURE — 81001 URINALYSIS AUTO W/SCOPE: CPT

## 2019-02-01 PROCEDURE — 63600175 PHARM REV CODE 636 W HCPCS: Performed by: INTERNAL MEDICINE

## 2019-02-01 PROCEDURE — 43266 EGD ENDOSCOPIC STENT PLACE: CPT | Performed by: INTERNAL MEDICINE

## 2019-02-01 PROCEDURE — 43266 PR EGD, FLEX, W/PLCMT, STENT: ICD-10-PCS | Mod: 51,,, | Performed by: INTERNAL MEDICINE

## 2019-02-01 PROCEDURE — 83690 ASSAY OF LIPASE: CPT

## 2019-02-01 PROCEDURE — 99217 PR OBSERVATION CARE DISCHARGE: ICD-10-PCS | Mod: ,,, | Performed by: INTERNAL MEDICINE

## 2019-02-01 PROCEDURE — 43274 ERCP DUCT STENT PLACEMENT: CPT | Performed by: INTERNAL MEDICINE

## 2019-02-01 PROCEDURE — 63600175 PHARM REV CODE 636 W HCPCS: Performed by: PHYSICIAN ASSISTANT

## 2019-02-01 PROCEDURE — C1876 STENT, NON-COA/NON-COV W/DEL: HCPCS | Performed by: INTERNAL MEDICINE

## 2019-02-01 PROCEDURE — C2617 STENT, NON-COR, TEM W/O DEL: HCPCS | Performed by: INTERNAL MEDICINE

## 2019-02-01 PROCEDURE — G0378 HOSPITAL OBSERVATION PER HR: HCPCS

## 2019-02-01 PROCEDURE — 74360 PR  X-RAY GUIDE, GI DILATION: ICD-10-PCS | Mod: 26,,, | Performed by: INTERNAL MEDICINE

## 2019-02-01 PROCEDURE — 27202300 HC NEEDLE, ACCESS: Performed by: INTERNAL MEDICINE

## 2019-02-01 PROCEDURE — D9220A PRA ANESTHESIA: ICD-10-PCS | Mod: ANES,,, | Performed by: ANESTHESIOLOGY

## 2019-02-01 PROCEDURE — 63600175 PHARM REV CODE 636 W HCPCS: Performed by: NURSE ANESTHETIST, CERTIFIED REGISTERED

## 2019-02-01 PROCEDURE — 43237 ENDOSCOPIC US EXAM ESOPH: CPT

## 2019-02-01 PROCEDURE — 25000003 PHARM REV CODE 250: Performed by: INTERNAL MEDICINE

## 2019-02-01 PROCEDURE — 87086 URINE CULTURE/COLONY COUNT: CPT

## 2019-02-01 PROCEDURE — 27202125 HC BALLOON, EXTRACTION (ANY): Performed by: INTERNAL MEDICINE

## 2019-02-01 PROCEDURE — 74360 X-RAY GUIDE GI DILATION: CPT | Mod: 26,,, | Performed by: INTERNAL MEDICINE

## 2019-02-01 PROCEDURE — 25000003 PHARM REV CODE 250: Performed by: PHYSICIAN ASSISTANT

## 2019-02-01 PROCEDURE — 99217 PR OBSERVATION CARE DISCHARGE: CPT | Mod: ,,, | Performed by: INTERNAL MEDICINE

## 2019-02-01 PROCEDURE — 76942 ECHO GUIDE FOR BIOPSY: CPT | Performed by: INTERNAL MEDICINE

## 2019-02-01 PROCEDURE — 43242 EGD US FINE NEEDLE BX/ASPIR: CPT | Mod: ,,, | Performed by: INTERNAL MEDICINE

## 2019-02-01 PROCEDURE — 63600175 PHARM REV CODE 636 W HCPCS

## 2019-02-01 PROCEDURE — 36415 COLL VENOUS BLD VENIPUNCTURE: CPT

## 2019-02-01 PROCEDURE — 80053 COMPREHEN METABOLIC PANEL: CPT

## 2019-02-01 PROCEDURE — 43266 EGD ENDOSCOPIC STENT PLACE: CPT | Mod: 51,,, | Performed by: INTERNAL MEDICINE

## 2019-02-01 PROCEDURE — 82150 ASSAY OF AMYLASE: CPT

## 2019-02-01 PROCEDURE — D9220A PRA ANESTHESIA: ICD-10-PCS | Mod: CRNA,,, | Performed by: NURSE ANESTHETIST, CERTIFIED REGISTERED

## 2019-02-01 PROCEDURE — 63600175 PHARM REV CODE 636 W HCPCS: Performed by: ANESTHESIOLOGY

## 2019-02-01 PROCEDURE — D9220A PRA ANESTHESIA: Mod: CRNA,,, | Performed by: NURSE ANESTHETIST, CERTIFIED REGISTERED

## 2019-02-01 PROCEDURE — D9220A PRA ANESTHESIA: Mod: ANES,,, | Performed by: ANESTHESIOLOGY

## 2019-02-01 PROCEDURE — C9113 INJ PANTOPRAZOLE SODIUM, VIA: HCPCS | Performed by: INTERNAL MEDICINE

## 2019-02-01 PROCEDURE — C1769 GUIDE WIRE: HCPCS | Performed by: INTERNAL MEDICINE

## 2019-02-01 PROCEDURE — 37000009 HC ANESTHESIA EA ADD 15 MINS: Performed by: INTERNAL MEDICINE

## 2019-02-01 PROCEDURE — C1874 STENT, COATED/COV W/DEL SYS: HCPCS | Performed by: INTERNAL MEDICINE

## 2019-02-01 RX ORDER — SUCCINYLCHOLINE CHLORIDE 20 MG/ML
INJECTION INTRAMUSCULAR; INTRAVENOUS
Status: DISCONTINUED | OUTPATIENT
Start: 2019-02-01 | End: 2019-02-01

## 2019-02-01 RX ORDER — HYDROMORPHONE HYDROCHLORIDE 1 MG/ML
0.5 INJECTION, SOLUTION INTRAMUSCULAR; INTRAVENOUS; SUBCUTANEOUS
Status: DISCONTINUED | OUTPATIENT
Start: 2019-02-01 | End: 2019-02-02

## 2019-02-01 RX ORDER — CIPROFLOXACIN 500 MG/1
500 TABLET ORAL 2 TIMES DAILY
Qty: 14 TABLET | Refills: 0 | Status: SHIPPED | OUTPATIENT
Start: 2019-02-01 | End: 2019-02-09

## 2019-02-01 RX ORDER — LORAZEPAM 1 MG/1
1 TABLET ORAL EVERY 8 HOURS PRN
Status: DISCONTINUED | OUTPATIENT
Start: 2019-02-01 | End: 2019-02-02 | Stop reason: HOSPADM

## 2019-02-01 RX ORDER — OXYCODONE AND ACETAMINOPHEN 10; 325 MG/1; MG/1
1 TABLET ORAL EVERY 6 HOURS PRN
Qty: 60 TABLET | Refills: 0 | Status: SHIPPED | OUTPATIENT
Start: 2019-02-01 | End: 2019-02-02 | Stop reason: SDUPTHER

## 2019-02-01 RX ORDER — HYDROMORPHONE HYDROCHLORIDE 1 MG/ML
0.2 INJECTION, SOLUTION INTRAMUSCULAR; INTRAVENOUS; SUBCUTANEOUS EVERY 5 MIN PRN
Status: DISCONTINUED | OUTPATIENT
Start: 2019-02-01 | End: 2019-02-01 | Stop reason: HOSPADM

## 2019-02-01 RX ORDER — LIDOCAINE HCL/PF 100 MG/5ML
SYRINGE (ML) INTRAVENOUS
Status: DISCONTINUED | OUTPATIENT
Start: 2019-02-01 | End: 2019-02-01

## 2019-02-01 RX ORDER — PROPOFOL 10 MG/ML
VIAL (ML) INTRAVENOUS
Status: DISCONTINUED | OUTPATIENT
Start: 2019-02-01 | End: 2019-02-01

## 2019-02-01 RX ORDER — ROCURONIUM BROMIDE 10 MG/ML
INJECTION, SOLUTION INTRAVENOUS
Status: DISCONTINUED | OUTPATIENT
Start: 2019-02-01 | End: 2019-02-01

## 2019-02-01 RX ORDER — ONDANSETRON 8 MG/1
8 TABLET, ORALLY DISINTEGRATING ORAL EVERY 6 HOURS PRN
Status: DISCONTINUED | OUTPATIENT
Start: 2019-02-01 | End: 2019-02-02 | Stop reason: HOSPADM

## 2019-02-01 RX ORDER — SODIUM CHLORIDE 0.9 % (FLUSH) 0.9 %
3 SYRINGE (ML) INJECTION
Status: DISCONTINUED | OUTPATIENT
Start: 2019-02-01 | End: 2019-02-02 | Stop reason: HOSPADM

## 2019-02-01 RX ORDER — FENTANYL CITRATE 50 UG/ML
INJECTION, SOLUTION INTRAMUSCULAR; INTRAVENOUS
Status: DISCONTINUED | OUTPATIENT
Start: 2019-02-01 | End: 2019-02-01

## 2019-02-01 RX ORDER — HYDROMORPHONE HYDROCHLORIDE 1 MG/ML
INJECTION, SOLUTION INTRAMUSCULAR; INTRAVENOUS; SUBCUTANEOUS
Status: COMPLETED
Start: 2019-02-01 | End: 2019-02-01

## 2019-02-01 RX ORDER — BISOPROLOL FUMARATE AND HYDROCHLOROTHIAZIDE 10; 6.25 MG/1; MG/1
1 TABLET ORAL DAILY
Status: DISCONTINUED | OUTPATIENT
Start: 2019-02-02 | End: 2019-02-01

## 2019-02-01 RX ORDER — SODIUM CHLORIDE 9 MG/ML
INJECTION, SOLUTION INTRAVENOUS CONTINUOUS
Status: DISCONTINUED | OUTPATIENT
Start: 2019-02-01 | End: 2019-02-02

## 2019-02-01 RX ORDER — ONDANSETRON 2 MG/ML
4 INJECTION INTRAMUSCULAR; INTRAVENOUS ONCE
Status: COMPLETED | OUTPATIENT
Start: 2019-02-01 | End: 2019-02-01

## 2019-02-01 RX ORDER — HYDROCODONE BITARTRATE AND ACETAMINOPHEN 10; 325 MG/1; MG/1
1 TABLET ORAL EVERY 6 HOURS PRN
Status: COMPLETED | OUTPATIENT
Start: 2019-02-01 | End: 2019-02-02

## 2019-02-01 RX ORDER — METOCLOPRAMIDE 10 MG/1
10 TABLET ORAL
Status: DISCONTINUED | OUTPATIENT
Start: 2019-02-01 | End: 2019-02-02 | Stop reason: HOSPADM

## 2019-02-01 RX ORDER — CIPROFLOXACIN 2 MG/ML
INJECTION, SOLUTION INTRAVENOUS
Status: DISCONTINUED | OUTPATIENT
Start: 2019-02-01 | End: 2019-02-01

## 2019-02-01 RX ORDER — BISOPROLOL FUMARATE 5 MG/1
10 TABLET, FILM COATED ORAL DAILY
Status: DISCONTINUED | OUTPATIENT
Start: 2019-02-02 | End: 2019-02-02 | Stop reason: HOSPADM

## 2019-02-01 RX ORDER — ONDANSETRON 2 MG/ML
INJECTION INTRAMUSCULAR; INTRAVENOUS
Status: DISCONTINUED | OUTPATIENT
Start: 2019-02-01 | End: 2019-02-01

## 2019-02-01 RX ORDER — HYDRALAZINE HYDROCHLORIDE 25 MG/1
75 TABLET, FILM COATED ORAL 3 TIMES DAILY
Status: DISCONTINUED | OUTPATIENT
Start: 2019-02-01 | End: 2019-02-02 | Stop reason: HOSPADM

## 2019-02-01 RX ORDER — SODIUM CHLORIDE 9 MG/ML
INJECTION, SOLUTION INTRAVENOUS CONTINUOUS
Status: DISCONTINUED | OUTPATIENT
Start: 2019-02-01 | End: 2019-02-01

## 2019-02-01 RX ORDER — PANTOPRAZOLE SODIUM 40 MG/10ML
40 INJECTION, POWDER, LYOPHILIZED, FOR SOLUTION INTRAVENOUS ONCE
Status: COMPLETED | OUTPATIENT
Start: 2019-02-01 | End: 2019-02-01

## 2019-02-01 RX ORDER — KETAMINE HCL IN 0.9 % NACL 50 MG/5 ML
SYRINGE (ML) INTRAVENOUS
Status: DISCONTINUED | OUTPATIENT
Start: 2019-02-01 | End: 2019-02-01

## 2019-02-01 RX ORDER — HYDROCHLOROTHIAZIDE 12.5 MG/1
12.5 TABLET ORAL DAILY
Status: DISCONTINUED | OUTPATIENT
Start: 2019-02-02 | End: 2019-02-02 | Stop reason: HOSPADM

## 2019-02-01 RX ORDER — IRBESARTAN 150 MG/1
150 TABLET ORAL NIGHTLY
Status: DISCONTINUED | OUTPATIENT
Start: 2019-02-01 | End: 2019-02-02 | Stop reason: HOSPADM

## 2019-02-01 RX ORDER — HYDROMORPHONE HYDROCHLORIDE 1 MG/ML
0.5 INJECTION, SOLUTION INTRAMUSCULAR; INTRAVENOUS; SUBCUTANEOUS
Status: DISCONTINUED | OUTPATIENT
Start: 2019-02-01 | End: 2019-02-01

## 2019-02-01 RX ORDER — METOCLOPRAMIDE HYDROCHLORIDE 5 MG/ML
10 INJECTION INTRAMUSCULAR; INTRAVENOUS ONCE
Status: COMPLETED | OUTPATIENT
Start: 2019-02-01 | End: 2019-02-01

## 2019-02-01 RX ORDER — CIPROFLOXACIN 2 MG/ML
400 INJECTION, SOLUTION INTRAVENOUS
Status: DISCONTINUED | OUTPATIENT
Start: 2019-02-01 | End: 2019-02-02 | Stop reason: HOSPADM

## 2019-02-01 RX ORDER — PANTOPRAZOLE SODIUM 40 MG/1
40 TABLET, DELAYED RELEASE ORAL DAILY
Status: DISCONTINUED | OUTPATIENT
Start: 2019-02-02 | End: 2019-02-02 | Stop reason: HOSPADM

## 2019-02-01 RX ORDER — MIDAZOLAM HYDROCHLORIDE 1 MG/ML
INJECTION, SOLUTION INTRAMUSCULAR; INTRAVENOUS
Status: DISCONTINUED | OUTPATIENT
Start: 2019-02-01 | End: 2019-02-01

## 2019-02-01 RX ADMIN — HYDRALAZINE HYDROCHLORIDE 75 MG: 25 TABLET, FILM COATED ORAL at 11:02

## 2019-02-01 RX ADMIN — MIDAZOLAM HYDROCHLORIDE 2 MG: 1 INJECTION, SOLUTION INTRAMUSCULAR; INTRAVENOUS at 10:02

## 2019-02-01 RX ADMIN — FENTANYL CITRATE 50 MCG: 50 INJECTION, SOLUTION INTRAMUSCULAR; INTRAVENOUS at 11:02

## 2019-02-01 RX ADMIN — HYDROCODONE BITARTRATE AND ACETAMINOPHEN 1 TABLET: 10; 325 TABLET ORAL at 10:02

## 2019-02-01 RX ADMIN — PROMETHAZINE HYDROCHLORIDE 12.5 MG: 25 INJECTION INTRAMUSCULAR; INTRAVENOUS at 04:02

## 2019-02-01 RX ADMIN — PANTOPRAZOLE SODIUM 40 MG: 40 INJECTION, POWDER, FOR SOLUTION INTRAVENOUS at 05:02

## 2019-02-01 RX ADMIN — ROCURONIUM BROMIDE 5 MG: 10 INJECTION, SOLUTION INTRAVENOUS at 10:02

## 2019-02-01 RX ADMIN — SODIUM CHLORIDE: 0.9 INJECTION, SOLUTION INTRAVENOUS at 05:02

## 2019-02-01 RX ADMIN — METOCLOPRAMIDE 10 MG: 5 INJECTION, SOLUTION INTRAMUSCULAR; INTRAVENOUS at 09:02

## 2019-02-01 RX ADMIN — FENTANYL CITRATE 50 MCG: 50 INJECTION, SOLUTION INTRAMUSCULAR; INTRAVENOUS at 10:02

## 2019-02-01 RX ADMIN — HYDROMORPHONE HYDROCHLORIDE 0.2 MG: 1 INJECTION, SOLUTION INTRAMUSCULAR; INTRAVENOUS; SUBCUTANEOUS at 02:02

## 2019-02-01 RX ADMIN — PROPOFOL 200 MG: 10 INJECTION, EMULSION INTRAVENOUS at 10:02

## 2019-02-01 RX ADMIN — HYDROMORPHONE HYDROCHLORIDE 0.2 MG: 1 INJECTION, SOLUTION INTRAMUSCULAR; INTRAVENOUS; SUBCUTANEOUS at 01:02

## 2019-02-01 RX ADMIN — ONDANSETRON 8 MG: 8 TABLET, ORALLY DISINTEGRATING ORAL at 08:02

## 2019-02-01 RX ADMIN — CIPROFLOXACIN 400 MG: 2 INJECTION, SOLUTION INTRAVENOUS at 11:02

## 2019-02-01 RX ADMIN — Medication 30 MG: at 11:02

## 2019-02-01 RX ADMIN — IRBESARTAN 150 MG: 150 TABLET ORAL at 11:02

## 2019-02-01 RX ADMIN — HYDROMORPHONE HYDROCHLORIDE 0.5 MG: 1 INJECTION, SOLUTION INTRAMUSCULAR; INTRAVENOUS; SUBCUTANEOUS at 05:02

## 2019-02-01 RX ADMIN — PROPOFOL 50 MG: 10 INJECTION, EMULSION INTRAVENOUS at 11:02

## 2019-02-01 RX ADMIN — PROMETHAZINE HYDROCHLORIDE 12.5 MG: 25 INJECTION INTRAMUSCULAR; INTRAVENOUS at 10:02

## 2019-02-01 RX ADMIN — SUCCINYLCHOLINE CHLORIDE 120 MG: 20 INJECTION, SOLUTION INTRAMUSCULAR; INTRAVENOUS at 10:02

## 2019-02-01 RX ADMIN — LIDOCAINE HYDROCHLORIDE 100 MG: 20 INJECTION, SOLUTION INTRAVENOUS at 10:02

## 2019-02-01 RX ADMIN — ONDANSETRON 4 MG: 2 INJECTION INTRAMUSCULAR; INTRAVENOUS at 11:02

## 2019-02-01 RX ADMIN — ONDANSETRON 4 MG: 2 INJECTION INTRAMUSCULAR; INTRAVENOUS at 12:02

## 2019-02-01 RX ADMIN — SODIUM CHLORIDE: 0.9 INJECTION, SOLUTION INTRAVENOUS at 10:02

## 2019-02-01 NOTE — [employer]ATION PATIENT INSTRUCTIONS
Discharge Summary/Instructions after an Endoscopic Procedure  Patient Name: Roopa Hanley  Patient MRN: [id number]  Patient YOB: 1950 Friday, February 01, 2019  Celestino Christianson MD  RESTRICTIONS:  During your procedure today, you received medications for sedation.  These   medications may affect your judgment, balance and coordination.  Therefore,   for 24 hours, you have the following restrictions:   - DO NOT drive a car, operate machinery, make legal/financial decisions,   sign important papers or drink alcohol.    ACTIVITY:  Today: no heavy lifting, straining or running due to procedural   sedation/anesthesia.  The following day: return to full activity including work.  DIET:  Eat and drink normally unless instructed otherwise.     TREATMENT FOR COMMON SIDE EFFECTS:  - Mild abdominal pain, nausea, belching, bloating or excessive gas:  rest,   eat lightly and use a heating pad.  - Sore Throat: treat with throat lozenges and/or gargle with warm salt   water.  - Because air was used during the procedure, expelling large amounts of air   from your rectum or belching is normal.  - If a bowel prep was taken, you may not have a bowel movement for 1-3 days.    This is normal.  SYMPTOMS TO WATCH FOR AND REPORT TO YOUR PHYSICIAN:  1. Abdominal pain or bloating, other than gas cramps.  2. Chest pain.  3. Back pain.  4. Signs of infection such as: chills or fever occurring within 24 hours   after the procedure.  5. Rectal bleeding, which would show as bright red, maroon, or black stools.   (A tablespoon of blood from the rectum is not serious, especially if   hemorrhoids are present.)  6. Vomiting.  7. Weakness or dizziness.  GO DIRECTLY TO THE NEAREST EMERGENCY ROOM IF YOU HAVE ANY OF THE FOLLOWING:      Difficulty breathing              Chills and/or fever over 101 F   Persistent vomiting and/or vomiting blood   Severe abdominal pain   Severe chest pain   Black, tarry stools   Bleeding- more than one  tablespoon   Any other symptom or condition that you feel may need urgent attention  Your doctor recommends these additional instructions:  If any biopsies were taken, your doctors clinic will contact you in 1 to 2   weeks with any results.  - Discharge patient to home.   - Pureed diet.   - Continue present medications.   - Cipro (ciprofloxacin) 500 mg PO BID for 1 week.   - Return to referring physician as previously scheduled.  For questions, problems or results please call your physician - Celestino Christianson MD at Work:  [phone].  [facility], EMERGENCY ROOM PHONE NUMBER: [phone]  IF A COMPLICATION OR EMERGENCY SITUATION ARISES AND YOU ARE UNABLE TO REACH   YOUR PHYSICIAN - GO DIRECTLY TO THE EMERGENCY ROOM.  Celestino Christianson MD  2/1/2019 12:33:57 PM  This report has been verified and signed electronically.  [employer]

## 2019-02-01 NOTE — TRANSFER OF CARE
"Anesthesia Transfer of Care Note    Patient: Roopa Hanley    Procedure(s) Performed: Procedure(s) (LRB):  EGD (ESOPHAGOGASTRODUODENOSCOPY) (N/A)  ULTRASOUND, UPPER GI TRACT, ENDOSCOPIC (N/A)    Patient location: [facility]    Anesthesia Type: general    Transport from OR: Transported from OR on room air with adequate spontaneous ventilation    Post pain: adequate analgesia    Post assessment: no apparent anesthetic complications and tolerated procedure well    Post vital signs: stable    Level of consciousness: awake and responds to stimulation    Nausea/Vomiting: no nausea/vomiting    Complications: none    Transfer of care protocol was followed      Last vitals:   Visit Vitals  /65   Pulse 71   Temp 36.6 °C (97.9 °F) (Temporal)   Resp 15   Ht 5' 3" (1.6 m)   Wt 79.4 kg (175 lb)   SpO2 95%   Breastfeeding? No   BMI 31.00 kg/m²     "

## 2019-02-01 NOTE — MEDICAL/APP STUDENT
Hospital Medicine  History and Physical Exam    Team: [facility] MED D Bebe Guido MD  Admit Date: 2/1/2019   Chief complaint: Nausea and vomiting post-anesthesia    Patient information was obtained from patient and anesthesiology .   Primary care Physician: Maikel Cesar MD  Code status: Full Code    HPI:   67 yo female on a background significant for HTN, an biliary obstruction with duodenal stenosis secondary to pancreatic cancer. Patient underwent ERCP with choledochoduodenostomy & duodenal stent placement. She suffered copious episodes of bilious emesis in PACU with some relief with Zofran. She reports some mild nausea a few days prior to the procedure, but no emesis. She denies any abdominal pain, but does report chronic right-sided back pain for about a year, consistent with her pancreatic cancer diagnosis; she takes Percocet 10mg PRN. She had a BM yesterday and takes Miralax daily. She denies any dizziness, dysuria or anorexia.      Past Medical History: Patient has a past medical history of Biliary obstruction, Duodenal stenosis, Hypertension, and Jaundice.    Past Surgical History: Patient has a past surgical history that includes Hysterectomy; Foot surgery (Right, 2012); Esophagogastroduodenoscopy (N/A, 1/25/2019); endoscopic ultrasound of upper gastrointestinal tract (N/A, 1/25/2019); and ERCP (N/A, 1/25/2019).    Social History: Patient reports that  has never smoked. she has never used smokeless tobacco. She reports that she does not drink alcohol or use drugs.    Family History: family history includes Cancer in her cousin, paternal aunt, and paternal uncle; Heart disease in her mother; Stroke in her father.    Medications:   Prior to Admission medications    Medication Sig Start Date End Date Taking? Authorizing Provider   bisoprolol-hydrochlorothiazide (ZIAC) 10-6.25 mg per tablet Take 1 tablet by mouth 2 (two) times daily. 12/17/18  Yes Historical Provider, MD   hydrALAZINE (APRESOLINE) 50 MG tablet  Take 75 mg by mouth 3 (three) times daily. 11/5/18  Yes Historical Provider, MD   irbesartan (AVAPRO) 150 MG tablet Take 150 mg by mouth every evening. 1/16/19  Yes Historical Provider, MD   LORazepam (ATIVAN) 1 MG tablet Take 1 mg by mouth every 8 (eight) hours as needed for Anxiety.   Yes Historical Provider, MD   ondansetron (ZOFRAN-ODT) 8 MG TbDL 8 mg every 6 (six) hours as needed. 1/16/19  Yes Historical Provider, MD   pantoprazole (PROTONIX) 40 MG tablet Take 40 mg by mouth once daily. 1/16/19  Yes Historical Provider, MD   oxyCODONE-acetaminophen (PERCOCET)  mg per tablet Take 1 tablet by mouth every 6 (six) hours as needed. 1/21/19 2/1/19 Yes Anh Hampton MD   amoxicillin (AMOXIL) 250 mg/5 mL suspension TAKE 2 TEASPOONSFUL BY MOUTH 3 TIMES A DAY FOR 7 DAYS 1/16/19   Historical Provider, MD   ciprofloxacin HCl (CIPRO) 500 MG tablet Take 1 tablet (500 mg total) by mouth 2 (two) times daily for 7 days 2/1/19 2/8/19  Celestino Christianson MD   oxyCODONE-acetaminophen (PERCOCET)  mg per tablet Take 1 tablet by mouth every 6 (six) hours as needed. 2/1/19 3/3/19  Celestino Christianson MD       Allergies: Patient has No Known Allergies.    ROS    Constitutional: no fever or chills  Respiratory: no cough or shortness of breath  Cardiovascular: no chest pain or palpitations  Gastrointestinal: (+) nausea/vomiting, no abdominal pain or change in bowel habits  Genitourinary: no hematuria or dysuria  Integument/Breast: no rash or pruritis  Hematologic/Lymphatic: no easy bruising or lymphadenopathy  Musculoskeletal: no arthralgias or myalgias, (+) chronic back pain  Neurological: no seizures or tremors  Behavioral/Psych: no depression or anxiety    PEx  Temp:  [97.9 °F (36.6 °C)-98.4 °F (36.9 °C)]   Pulse:  [63-78]   Resp:  [15-21]   BP: (127-174)/(65-79)   SpO2:  [95 %-100 %]   Body mass index is 31 kg/m².     General appearance: no distress, (+) jaundice.  Mental status: Alert and oriented x 3  HEENT:   conjunctivae/corneas clear, PERRL, (+) icterus.   Neck: supple, thyroid not enlarged  Pulm:   normal respiratory effort, CTA B, no c/w/r  Card: RRR, no murmur  Abd: (+) Dry mucous membranes, distension & hypoactive BS; no masses, no organomegaly  Ext: no edema  Pulses: 2+, symmetric  Skin: texture, turgor normal. No rashes or lesions  Neuro: CN II-XII grossly intact, no focal numbness or weakness, normal strength and tone     Active Hospital Problems    Diagnosis  POA    Pancreas cancer [C25.9]  Yes      Resolved Hospital Problems   No resolved problems to display.       Overview:  67 yo female on a background significant for HTN, an biliary obstruction with duodenal stenosis secondary to pancreatic cancer. Patient underwent ERCP with choledochoduodenostomy & duodenal stent placement and is admitted for nausea and bilious vomiting, post-anesthesia.     Assessment and Plan:    Pancreas cancer  Biliary obstruction with duodenal stenosis  Post-anesthesia nausea and vomiting  · Nausea and vomiting following scheduled ERCP: Succesful palliation of malignant biliary and gastric obstruction with choledochoduodenstomy and duodenal stent placement. Continue 1 week-course of prophylactic ciprofloxacin PO 500mg BID.    · Work-up pending: CMP, CBC, UA, lipase, amylase.  · Ordered scheduled Reglan and PRN Zofran. Analgesia: PRN Dilaudid.  · Ordered NS @ 100mL hr, IV PPI x1 and resuming home pantoprazole. Clear liquid as tolerated.     Essential Hypertension  · Resumed home anti-hypertensives: irbesartan, brisolol, HCTZ, and hydralazine.      Diet:  Diet clear liquid  GI PPx: Pantoprazole.  DVT PPx: TEDs   Lines: PIV  Drains: None    Goals of Care: Curative  Discharge plan: TBD    Time (minutes) spent in care of the patient (Greater than 1/2 spent in direct face-to-face contact) 35min    Bebe Guido MD    Scribe Attestation: I personally scribed for Celestino Christianson MD on 02/01/2019 at 4:30 PM. Electronically signed by jane  Bethanie Navas on 02/01/2019 at 4:30 PM.

## 2019-02-01 NOTE — NURSING TRANSFER
Nursing Transfer Note      2/1/2019     Transfer To: [facility] from [facility]    Transfer via stretcher    Transfer with SL    Transported by PCT    Medicines sent: None    Chart send with patient: Yes    Notified: Report called to RN    Patient reassessed at: 1630. Awake and alert. VSS. Pain is tolerable.Nausea improved. Stable condition.Family with patient.    Upon arrival to floor: bed in lowest position

## 2019-02-01 NOTE — PLAN OF CARE
Pt request bedside delivery for cipro. Pharmacy notified. Dr. Christianson at bedside speaking with patient

## 2019-02-01 NOTE — ANESTHESIA PREPROCEDURE EVALUATION
02/01/2019  Roopa Hanley is a 68 y.o., female                                                                      Past Medical History:   Diagnosis Date    Biliary obstruction     Duodenal stenosis     Hypertension     Jaundice      Past Surgical History:   Procedure Laterality Date    EGD (ESOPHAGOGASTRODUODENOSCOPY) N/A 1/25/2019    Performed by Rashawn Martinez MD at [facility] (2ND FLR)    ERCP (ENDOSCOPIC RETROGRADE CHOLANGIOPANCREATOGRAPHY) N/A 1/25/2019    Performed by Rashawn Martinez MD at [facility] (2ND FLR)    FOOT SURGERY Right 2012    HYSTERECTOMY      partial    ULTRASOUND, UPPER GI TRACT, ENDOSCOPIC N/A 1/25/2019    Performed by [facility] at [facility] (2ND FLR)         Anesthesia Evaluation         Review of Systems  Anesthesia Hx:  No problems with previous Anesthesia   Social:  Non-Smoker, No Alcohol Use    Hematology/Oncology:        Oncology Comments: Mass of pancreas   Cardiovascular:   Hypertension    Pulmonary:  Pulmonary Normal    Hepatic/GI:   Mass of pancreas,  Biliary obstruction       Physical Exam  General:  Obesity    Airway/Jaw/Neck:  Airway Findings: Mouth Opening: Normal Tongue: Normal  General Airway Assessment: Adult            Mental Status:  Mental Status Findings:  Alert and Oriented, Cooperative         Anesthesia Plan  Type of Anesthesia, risks & benefits discussed:  Anesthesia Type:  general  Patient's Preference:   Intra-op Monitoring Plan: standard ASA monitors  Intra-op Monitoring Plan Comments:   Post Op Pain Control Plan:   Post Op Pain Control Plan Comments:   Induction:   IV  Beta Blocker:    Patient is on a Beta-Blocker and is not required to be redosed perioperatively for the following medical conditions:     Informed Consent: Patient understands risks and agrees with Anesthesia plan.  Questions answered. Anesthesia consent signed with  patient.  ASA Score: 2     Day of Surgery Review of History & Physical:            Ready For Surgery From Anesthesia Perspective.     .    Anesthesia Evaluation    I have reviewed the Patient Summary Reports.    I have reviewed the Nursing Notes.   I have reviewed the Medications.     Review of Systems  Anesthesia Hx:  No problems with previous Anesthesia  History of prior surgery of interest to airway management or planning: Previous anesthesia: General Denies Family Hx of Anesthesia complications.   Denies Personal Hx of Anesthesia complications.   Social:  Non-Smoker    Cardiovascular:   Hypertension Denies MI.  Denies CAD.           Pulmonary:  Pulmonary Normal    Hepatic/GI:   Pancreatic CA with biliary obstruction   Neurological:  Neurology Normal    Endocrine:  Endocrine Normal        Physical Exam  General:  Well nourished    Airway/Jaw/Neck:  Airway Findings: Mouth Opening: Normal Tongue: Normal  General Airway Assessment: Adult  Mallampati: II  Improves to I with phonation.  TM Distance: Normal, at least 6 cm  Jaw/Neck Findings:  Neck ROM: Normal ROM      Dental:  Dental Findings: In tact        Mental Status:  Mental Status Findings:  Cooperative, Alert and Oriented         Anesthesia Plan  Type of Anesthesia, risks & benefits discussed:  Anesthesia Type:  general  Patient's Preference:   Intra-op Monitoring Plan: standard ASA monitors  Intra-op Monitoring Plan Comments:   Post Op Pain Control Plan:   Post Op Pain Control Plan Comments:   Induction:   IV  Beta Blocker:         Informed Consent: Patient understands risks and agrees with Anesthesia plan.  Questions answered. Anesthesia consent signed with patient.  ASA Score: 3     Day of Surgery Review of History & Physical:    H&P update referred to the surgeon.         Ready For Surgery From Anesthesia Perspective.

## 2019-02-01 NOTE — H&P
Short Stay Endoscopy History and Physical    PCP - Maikel Cesar MD  Referring Physician - Rashawn Martinez MD  [address] [place] [address]    Procedure - eus bile duct access  ASA - per anesthesia  Mallampati - per anesthesia  History of Anesthesia problems - no  Family history Anesthesia problems -  no   Plan of anesthesia - General    HPI:  This is a 68 y.o. female here for evaluation of: gastric outlet obstruction    Reflux - no  Dysphagia - no  Abdominal pain - no  Diarrhea - no    ROS:  Constitutional: No fevers, chills, No weight loss  CV: No chest pain  Pulm: No cough, No shortness of breath  Ophtho: No vision changes  GI: see HPI  Derm: No rash    Medical History:  has a past medical history of Biliary obstruction, Duodenal stenosis, Hypertension, and Jaundice.    Surgical History:  has a past surgical history that includes Hysterectomy; Foot surgery (Right, 2012); Esophagogastroduodenoscopy (N/A, 1/25/2019); endoscopic ultrasound of upper gastrointestinal tract (N/A, 1/25/2019); and ERCP (N/A, 1/25/2019).    Family History: family history includes Cancer in her cousin, paternal aunt, and paternal uncle; Heart disease in her mother; Stroke in her father..    Social History:  reports that  has never smoked. she has never used smokeless tobacco. She reports that she does not drink alcohol or use drugs.    Review of patient's allergies indicates:  No Known Allergies    Medications:   Medications Prior to Admission   Medication Sig Dispense Refill Last Dose    bisoprolol-hydrochlorothiazide (ZIAC) 10-6.25 mg per tablet Take 1 tablet by mouth 2 (two) times daily.  1 2/1/2019 at Unknown time    hydrALAZINE (APRESOLINE) 50 MG tablet Take 75 mg by mouth 3 (three) times daily.  1 2/1/2019 at Unknown time    irbesartan (AVAPRO) 150 MG tablet Take 150 mg by mouth every evening.  0 1/31/2019 at Unknown time    LORazepam (ATIVAN) 1 MG tablet Take 1 mg by mouth every 8 (eight) hours as needed for  Anxiety.   1/31/2019 at Unknown time    ondansetron (ZOFRAN-ODT) 8 MG TbDL 8 mg every 6 (six) hours as needed.  0 1/31/2019 at Unknown time    oxyCODONE-acetaminophen (PERCOCET)  mg per tablet Take 1 tablet by mouth every 6 (six) hours as needed. 60 tablet 0 2/1/2019 at Unknown time    pantoprazole (PROTONIX) 40 MG tablet Take 40 mg by mouth once daily.  1 1/31/2019 at Unknown time    amoxicillin (AMOXIL) 250 mg/5 mL suspension TAKE 2 TEASPOONSFUL BY MOUTH 3 TIMES A DAY FOR 7 DAYS  1 Taking       Physical Exam:    Vital Signs:   Vitals:    02/01/19 0959   BP: (!) 146/73   Pulse: 78   Resp: 17   Temp: 98.4 °F (36.9 °C)       General Appearance: Well appearing in no acute distress    Labs:  Lab Results   Component Value Date    WBC 7.54 01/18/2019    HGB 9.4 (L) 01/18/2019    HCT 29.3 (L) 01/18/2019     01/18/2019     (H) 01/18/2019     (H) 01/18/2019     01/18/2019    K 3.8 01/18/2019    CL 98 01/18/2019    CREATININE 0.8 01/18/2019    BUN 10 01/18/2019    CO2 30 (H) 01/18/2019       I have explained the risks and benefits of this endoscopic procedure to the patient including but not limited to bleeding, inflammation, infection, perforation, and death.      Celestino Christianson MD

## 2019-02-01 NOTE — DISCHARGE INSTRUCTIONS
Endoscopic Ultrasound (EUS)    An endoscopic ultrasound (EUS) is a test to look at the inside of your gastrointestinal (GI) tract. It's commonly used to look for cancers or growths in the esophagus, stomach, pancreas, liver, and rectum. It can help to stage cancer (see how advanced a cancer is). EUS may also be used to help diagnose certain diseases or to drain cysts or abscesses.  What is EUS?  EUS shows both ultrasound images and live video of the GI tract. During the test, a flexible tube called an endoscope (scope) is used. At the end of the scope is a tiny video camera and light. The video camera sends live images to a monitor. The scope also contains a very small ultrasound device. This uses sound waves to create images and send them to a monitor.  A needle is passed through the scope. The needle can be used take a small sample of tissue for testing. This is called a biopsy. The needle can be used to take a sample of fluid. This is called fine-needle aspiration (FNA).  Risks and possible complications of EUS  Risks and possible complications include the following:  · Bleeding  · Infection  · A perforation (hole) in the digestive tract   · Risks of sedation or anesthesia   Before the test  Be prepared prior to the test:  · Tell your healthcare provider what medicine you take. This includes vitamins, herbs, and over-the-counter medicine. It also includes any blood thinners, such as warfarin, clopidogrel, ibuprofen, or daily aspirin. Ask your healthcare provider if you need to stop taking some or all of them before the test.  · You may be prescribed antibiotics to take before or after the test. This depends on the area being studied and what is done during the test. These medicines help prevent infection.  · Carefully follow the instructions for preparing for the test to make sure results are accurate. Instructions may include:  ¨ If youre having an EUS of the upper GI tract (esophagus, stomach, duodenum,  pancreas, liver):  § Do not eat or drink for 6 hours before the test.  ¨ If youre having an EUS of the lower GI tract (rectum):  § Before the test, do bowel prep as instructed to clean your rectum of stool. This may involve a clear liquid diet and using a laxative (liquid or pills) the night before the test. Or it may mean doing one or more enemas the morning of the test.  § Do not eat or drink for 6 hours before the test.  · Be sure to arrive on time at the facility. Bring your identification and health insurance card. Leave valuables at home. If you have them, bring X-rays or other test results with you.  Let the healthcare provider know  For your safety, tell the healthcare provider if you:  · Take insulin. Your dose may need to be changed on the day of your test.  · Are allergic to latex.  · Have any other allergies.  · Are taking blood thinners.   During the test  An endoscopic ultrasound usually takes place in a hospital. The procedure itself may take 1 to 2 hours. You will likely go home soon afterward. During the test:  · You lie on your left side on an exam table.  · An intravenous (IV) line will be put into a vein in your arm or hand. This line supplies fluids and medicines. To keep you comfortable during the test, you will be given a sedative medicine. This medicine prevents discomfort and will make you sleepy.  · If you are having an EUS of the upper GI tract, local anesthetic may be sprayed in your throat. This will help you be more comfortable as the healthcare provider inserts the scope. The healthcare provider then gently puts the flexible scope into your mouth or nose and down your throat.  · If youre having an EUS of the lower GI tract, the healthcare provider gently puts the flexible scope into your anus.  · During the test, the scope sends live video and ultrasound images from inside your body to nearby monitors. These are used to examine your GI tract. Specialized procedures, such as drainage,  are done as needed.  · The healthcare provider may discuss the results with you soon after the test. Biopsy results take several  days.  · In most cases, you can go home within a few hours of the test. When you leave the facility, have an adult family member or friend drive you, even if you don't feel that sleepy.  After the test  Here is what to expect after the test:  · You may feel tired from the sedative. This should wear off by the end of the day.  · If you had an upper digestive endoscopy, your throat may feel sore for a day or two. Over-the-counter sore throat lozenges and spray should help.  · You can eat and drink normally as soon as the test is done.  When to call the healthcare provider  Call your healthcare provider if you notice any of the following:  · Fever of 100.4°F (38.0°C) or higher, or as advised by your healthcare provider  · Shortness of breath  · Vomiting blood, blood in stool, or black stools  · Coughing or hoarse voice that wont go away   Date Last Reviewed: 7/1/2016 © 2000-2017 [employer]. [address] [place] [address]. All rights reserved. This information is not intended as a substitute for professional medical care. Always follow your healthcare professional's instructions.        Upper GI Endoscopy     During endoscopy, a long, flexible tube is used to view the inside of your upper GI tract.      Upper GI endoscopy allows your healthcare provider to look directly into the beginning of your gastrointestinal (GI) tract. The esophagus, stomach, and duodenum (the first part of the small intestine) make up the upper GI tract.   Before the exam  Follow these and any other instructions you are given before your endoscopy. If you dont follow the healthcare providers instructions carefully, the test may need to be canceled or done over:  · Don't eat or drink anything after midnight the night before your exam. If your exam is in the afternoon, drink only clear  liquids in the morning. Don't eat or drink anything for 8 hours before the exam. In some cases, you may be able to take medicines with sips of water until 2 hours before the procedure. Speak with your healthcare provider about this.   · Bring your X-rays and any other test results you have.  · Because you will be sedated, arrange for an adult to drive you home after the exam.  · Tell your healthcare provider before the exam if you are taking any medicines or have any medical problems.  The procedure  Here is what to expect:  · You will lie on the endoscopy table. Usually patients lie on the left side.  · You will be monitored and given oxygen.  · Your throat may be numbed with a spray or gargle. You are given medicine through an intravenous (IV) line that will help you relax and remain comfortable. You may be awake or asleep during the procedure.  · The healthcare provider will put the endoscope in your mouth and down your esophagus. It is thinner than most pieces of food that you swallow. It will not affect your breathing. The medicine helps keep you from gagging.  · Air is put into your GI tract to expand it. It can make you burp.  · During the procedure, the healthcare provider can take biopsies (tissue samples), remove abnormalities, such as polyps, or treat abnormalities through a variety of devices placed through the endoscope. You will not feel this.   · The endoscope carries images of your upper GI tract to a video screen. If you are awake, you may be able to look at the images.  · After the procedure is done, you will rest for a time. An adult must drive you home.  When to call your healthcare provider  Contact your healthcare provider if you have:  · Black or tarry stools, or blood in your stool  · Fever  · Pain in your belly that does not go away  · Nausea and vomiting, or vomiting blood   Date Last Reviewed: 7/1/2016  © 9665-4600 [employer]. [address] [place] [address], [place] [address]. All  rights reserved. This information is not intended as a substitute for professional medical care. Always follow your healthcare professional's instructions.

## 2019-02-02 VITALS
HEART RATE: 73 BPM | RESPIRATION RATE: 18 BRPM | DIASTOLIC BLOOD PRESSURE: 74 MMHG | BODY MASS INDEX: 31.01 KG/M2 | TEMPERATURE: 99 F | WEIGHT: 175 LBS | HEIGHT: 63 IN | SYSTOLIC BLOOD PRESSURE: 180 MMHG | OXYGEN SATURATION: 96 %

## 2019-02-02 PROBLEM — K31.5 DUODENAL STENOSIS: Status: ACTIVE | Noted: 2019-02-02

## 2019-02-02 PROBLEM — R11.10: Status: ACTIVE | Noted: 2019-02-02

## 2019-02-02 PROBLEM — K83.1 BILIARY DISEASE WITH OBSTRUCTION: Status: ACTIVE | Noted: 2019-02-02

## 2019-02-02 PROBLEM — Z98.890: Status: ACTIVE | Noted: 2019-02-02

## 2019-02-02 LAB
ALBUMIN SERPL BCP-MCNC: 2.1 G/DL
ALP SERPL-CCNC: 544 U/L
ALT SERPL W/O P-5'-P-CCNC: 75 U/L
ANION GAP SERPL CALC-SCNC: 10 MMOL/L
AST SERPL-CCNC: 65 U/L
BILIRUB SERPL-MCNC: 9.8 MG/DL
BUN SERPL-MCNC: 19 MG/DL
CALCIUM SERPL-MCNC: 8.9 MG/DL
CHLORIDE SERPL-SCNC: 102 MMOL/L
CO2 SERPL-SCNC: 26 MMOL/L
CREAT SERPL-MCNC: 1 MG/DL
EST. GFR  (AFRICAN AMERICAN): >60 ML/MIN/1.73 M^2
EST. GFR  (NON AFRICAN AMERICAN): 58.8 ML/MIN/1.73 M^2
GLUCOSE SERPL-MCNC: 144 MG/DL
PHOSPHATE SERPL-MCNC: 3.3 MG/DL
POTASSIUM SERPL-SCNC: 3 MMOL/L
PROT SERPL-MCNC: 6 G/DL
SODIUM SERPL-SCNC: 138 MMOL/L

## 2019-02-02 PROCEDURE — 36415 COLL VENOUS BLD VENIPUNCTURE: CPT

## 2019-02-02 PROCEDURE — 99217 PR OBSERVATION CARE DISCHARGE: ICD-10-PCS | Mod: ,,, | Performed by: INTERNAL MEDICINE

## 2019-02-02 PROCEDURE — 25000003 PHARM REV CODE 250: Performed by: INTERNAL MEDICINE

## 2019-02-02 PROCEDURE — 25000003 PHARM REV CODE 250: Performed by: PHYSICIAN ASSISTANT

## 2019-02-02 PROCEDURE — 63600175 PHARM REV CODE 636 W HCPCS: Performed by: INTERNAL MEDICINE

## 2019-02-02 PROCEDURE — 99217 PR OBSERVATION CARE DISCHARGE: CPT | Mod: ,,, | Performed by: INTERNAL MEDICINE

## 2019-02-02 PROCEDURE — 84100 ASSAY OF PHOSPHORUS: CPT

## 2019-02-02 PROCEDURE — G0378 HOSPITAL OBSERVATION PER HR: HCPCS

## 2019-02-02 PROCEDURE — 80053 COMPREHEN METABOLIC PANEL: CPT

## 2019-02-02 RX ORDER — OXYCODONE AND ACETAMINOPHEN 10; 325 MG/1; MG/1
1 TABLET ORAL EVERY 6 HOURS PRN
Qty: 60 TABLET | Refills: 0 | Status: SHIPPED | OUTPATIENT
Start: 2019-02-02 | End: 2019-03-04

## 2019-02-02 RX ORDER — POLYETHYLENE GLYCOL 3350 17 G/17G
17 POWDER, FOR SOLUTION ORAL DAILY
Status: DISCONTINUED | OUTPATIENT
Start: 2019-02-02 | End: 2019-02-02 | Stop reason: HOSPADM

## 2019-02-02 RX ORDER — HYDROMORPHONE HYDROCHLORIDE 1 MG/ML
0.5 INJECTION, SOLUTION INTRAMUSCULAR; INTRAVENOUS; SUBCUTANEOUS
Status: DISCONTINUED | OUTPATIENT
Start: 2019-02-02 | End: 2019-02-02 | Stop reason: HOSPADM

## 2019-02-02 RX ORDER — POTASSIUM CHLORIDE 750 MG/1
30 CAPSULE, EXTENDED RELEASE ORAL
Status: ACTIVE | OUTPATIENT
Start: 2019-02-02 | End: 2019-02-02

## 2019-02-02 RX ORDER — METOCLOPRAMIDE HYDROCHLORIDE 5 MG/ML
5 INJECTION INTRAMUSCULAR; INTRAVENOUS EVERY 6 HOURS PRN
Status: DISCONTINUED | OUTPATIENT
Start: 2019-02-02 | End: 2019-02-02 | Stop reason: HOSPADM

## 2019-02-02 RX ORDER — KETOROLAC TROMETHAMINE 30 MG/ML
15 INJECTION, SOLUTION INTRAMUSCULAR; INTRAVENOUS EVERY 6 HOURS PRN
Status: DISCONTINUED | OUTPATIENT
Start: 2019-02-02 | End: 2019-02-02 | Stop reason: HOSPADM

## 2019-02-02 RX ORDER — SODIUM CHLORIDE AND POTASSIUM CHLORIDE 150; 900 MG/100ML; MG/100ML
INJECTION, SOLUTION INTRAVENOUS CONTINUOUS
Status: DISCONTINUED | OUTPATIENT
Start: 2019-02-02 | End: 2019-02-02 | Stop reason: HOSPADM

## 2019-02-02 RX ORDER — ENOXAPARIN SODIUM 100 MG/ML
40 INJECTION SUBCUTANEOUS EVERY 24 HOURS
Status: DISCONTINUED | OUTPATIENT
Start: 2019-02-02 | End: 2019-02-02 | Stop reason: HOSPADM

## 2019-02-02 RX ORDER — PROMETHAZINE HYDROCHLORIDE 25 MG/1
25 TABLET ORAL EVERY 6 HOURS PRN
Qty: 60 TABLET | Refills: 0 | Status: SHIPPED | OUTPATIENT
Start: 2019-02-02 | End: 2019-03-21 | Stop reason: SDUPTHER

## 2019-02-02 RX ADMIN — KETOROLAC TROMETHAMINE 15 MG: 30 INJECTION, SOLUTION INTRAMUSCULAR at 03:02

## 2019-02-02 RX ADMIN — HYDROCODONE BITARTRATE AND ACETAMINOPHEN 1 TABLET: 10; 325 TABLET ORAL at 10:02

## 2019-02-02 RX ADMIN — ONDANSETRON 8 MG: 8 TABLET, ORALLY DISINTEGRATING ORAL at 10:02

## 2019-02-02 RX ADMIN — BISOPROLOL FUMARATE 10 MG: 5 TABLET ORAL at 10:02

## 2019-02-02 RX ADMIN — METOCLOPRAMIDE 10 MG: 10 TABLET ORAL at 10:02

## 2019-02-02 RX ADMIN — METOCLOPRAMIDE 10 MG: 10 TABLET ORAL at 06:02

## 2019-02-02 RX ADMIN — METOCLOPRAMIDE 5 MG: 5 INJECTION, SOLUTION INTRAMUSCULAR; INTRAVENOUS at 03:02

## 2019-02-02 RX ADMIN — HYDRALAZINE HYDROCHLORIDE 75 MG: 25 TABLET, FILM COATED ORAL at 06:02

## 2019-02-02 RX ADMIN — METOCLOPRAMIDE 10 MG: 10 TABLET ORAL at 03:02

## 2019-02-02 RX ADMIN — POTASSIUM CHLORIDE 30 MEQ: 750 CAPSULE, EXTENDED RELEASE ORAL at 10:02

## 2019-02-02 RX ADMIN — HYDROCODONE BITARTRATE AND ACETAMINOPHEN 1 TABLET: 10; 325 TABLET ORAL at 03:02

## 2019-02-02 RX ADMIN — PANTOPRAZOLE SODIUM 40 MG: 40 TABLET, DELAYED RELEASE ORAL at 10:02

## 2019-02-02 RX ADMIN — SODIUM CHLORIDE AND POTASSIUM CHLORIDE: .9; .15 SOLUTION INTRAVENOUS at 01:02

## 2019-02-02 RX ADMIN — HYDROCHLOROTHIAZIDE 12.5 MG: 12.5 TABLET ORAL at 10:02

## 2019-02-02 NOTE — H&P
Physician Attestation for Scribe:  I, Bebe Guido MD, personally performed the services described in this documentation. All medical record entries made by the scribe were at my direction and in my presence.  I have reviewed the chart and agree that the record reflects my personal performance and is accurate and complete.   Bebe Guido MD    [facility] Medicine  History and Physical Exam    Team: [facility] MED D Bebe Guido MD  Admit Date: 2/1/2019   Chief complaint: Nausea and vomiting post-anesthesia    Patient information was obtained from patient and anesthesiology .   Primary care Physician: Maikel Cesar MD  Code status: Full Code    HPI:   69 yo female on a background significant for HTN, an biliary obstruction with duodenal stenosis secondary to pancreatic cancer. Patient underwent ERCP with choledochoduodenostomy & duodenal stent placement. She suffered copious episodes of bilious emesis in PACU with some relief with Zofran. She reports some mild nausea a few days prior to the procedure, but no emesis. She denies any abdominal pain, but does report chronic right-sided back pain for about a year, consistent with her pancreatic cancer diagnosis; she takes Percocet 10mg PRN. She had a BM yesterday and takes Miralax daily. She denies any dizziness, dysuria or anorexia.      Past Medical History: Patient has a past medical history of Biliary obstruction, Duodenal stenosis, Hypertension, and Jaundice.    Past Surgical History: Patient has a past surgical history that includes Hysterectomy; Foot surgery (Right, 2012); Esophagogastroduodenoscopy (N/A, 1/25/2019); endoscopic ultrasound of upper gastrointestinal tract (N/A, 1/25/2019); and ERCP (N/A, 1/25/2019).    Social History: Patient reports that  has never smoked. she has never used smokeless tobacco. She reports that she does not drink alcohol or use drugs.    Family History: family history includes Cancer in her cousin, paternal aunt, and paternal uncle;  Heart disease in her mother; Stroke in her father.    Medications:   Prior to Admission medications    Medication Sig Start Date End Date Taking? Authorizing Provider   bisoprolol-hydrochlorothiazide (ZIAC) 10-6.25 mg per tablet Take 1 tablet by mouth 2 (two) times daily. 12/17/18  Yes Historical Provider, MD   hydrALAZINE (APRESOLINE) 50 MG tablet Take 75 mg by mouth 3 (three) times daily. 11/5/18  Yes Historical Provider, MD   irbesartan (AVAPRO) 150 MG tablet Take 150 mg by mouth every evening. 1/16/19  Yes Historical Provider, MD   LORazepam (ATIVAN) 1 MG tablet Take 1 mg by mouth every 8 (eight) hours as needed for Anxiety.   Yes Historical Provider, MD   ondansetron (ZOFRAN-ODT) 8 MG TbDL 8 mg every 6 (six) hours as needed. 1/16/19  Yes Historical Provider, MD   pantoprazole (PROTONIX) 40 MG tablet Take 40 mg by mouth once daily. 1/16/19  Yes Historical Provider, MD   oxyCODONE-acetaminophen (PERCOCET)  mg per tablet Take 1 tablet by mouth every 6 (six) hours as needed. 1/21/19 2/1/19 Yes Anh Hampton MD   amoxicillin (AMOXIL) 250 mg/5 mL suspension TAKE 2 TEASPOONSFUL BY MOUTH 3 TIMES A DAY FOR 7 DAYS 1/16/19   Historical Provider, MD   ciprofloxacin HCl (CIPRO) 500 MG tablet Take 1 tablet (500 mg total) by mouth 2 (two) times daily for 7 days 2/1/19 2/8/19  Celestino Christianson MD   oxyCODONE-acetaminophen (PERCOCET)  mg per tablet Take 1 tablet by mouth every 6 (six) hours as needed. 2/1/19 3/3/19  Celestino Christianson MD       Allergies: Patient has No Known Allergies.    ROS    Constitutional: no fever or chills  Respiratory: no cough or shortness of breath  Cardiovascular: no chest pain or palpitations  Gastrointestinal: (+) nausea/vomiting, no abdominal pain or change in bowel habits  Genitourinary: no hematuria or dysuria  Integument/Breast: no rash or pruritis  Hematologic/Lymphatic: no easy bruising or lymphadenopathy  Musculoskeletal: no arthralgias or myalgias, (+) chronic back  pain  Neurological: no seizures or tremors  Behavioral/Psych: no depression or anxiety    PEx  Temp:  [97.7 °F (36.5 °C)-98.7 °F (37.1 °C)]   Pulse:  [63-78]   Resp:  [15-21]   BP: (127-202)/(65-93)   SpO2:  [95 %-100 %]   Body mass index is 31 kg/m².     General appearance: no distress, (+) jaundice.  Mental status: Alert and oriented x 3  HEENT:  conjunctivae/corneas clear, PERRL, (+) icterus.   Neck: supple, thyroid not enlarged  Pulm:   normal respiratory effort, CTA B, no c/w/r  Card: RRR, no murmur  Abd: (+) Dry mucous membranes, distension & hypoactive BS; no masses, no organomegaly  Ext: no edema  Pulses: 2+, symmetric  Skin: texture, turgor normal. No rashes or lesions  Neuro: CN II-XII grossly intact, no focal numbness or weakness, normal strength and tone     Active Hospital Problems    Diagnosis  POA    Pancreas cancer [C25.9]  Yes      Resolved Hospital Problems   No resolved problems to display.       Overview:  67 yo female on a background significant for HTN, an biliary obstruction with duodenal stenosis secondary to pancreatic cancer. Patient underwent ERCP with choledochoduodenostomy & duodenal stent placement and is admitted for nausea and bilious vomiting, post-anesthesia.     Assessment and Plan:    Pancreas cancer  Biliary obstruction with duodenal stenosis  Severe Post-anesthesia nausea and vomiting  · Nausea and vomiting following scheduled ERCP: Succesful palliation of malignant biliary and gastric obstruction with choledochoduodenstomy and duodenal stent placement. Continue 1 week-course of prophylactic ciprofloxacin PO 500mg BID.    · Work-up pending: CMP, CBC, UA, lipase, amylase.  · Ordered scheduled Reglan and PRN Zofran. Analgesia: PRN Dilaudid.  · Ordered NS @ 100mL hr, IV PPI x1 and resuming home pantoprazole. Clear liquid as tolerated.     Essential Hypertension  · Resumed home anti-hypertensives: irbesartan, brisolol, HCTZ, and hydralazine.      Diet:  Diet clear liquid  GI  PPx: Pantoprazole.  DVT PPx: TEDs   Lines: PIV  Drains: None    Goals of Care: Curative  Discharge plan: TBD    Time (minutes) spent in care of the patient (Greater than 1/2 spent in direct face-to-face contact) 35min    Bebe Guido MD    Scribe Attestation: I personally scribed for Celestino Christianson MD on 02/02/2019 at 4:30 PM. Electronically signed by jane Navas on 02/02/2019 at 4:30 PM.

## 2019-02-02 NOTE — MEDICAL/APP STUDENT
"Discharge Summary  Hospital Medicine    Attending Provider on Discharge: Bebe Guido MD  [facility] Medicine Team: [facility] MED D  Date of Admission:  2/1/2019     Date of Discharge:  2/2/2019  Length of Stay:  LOS: 0 days   Code status: Full Code    Active Hospital Problems    Diagnosis  POA    Duodenal stenosis [K31.5]  Yes    Severe vomiting following anesthesia [R11.10]  Yes    Pancreas cancer [C25.9]  Yes    Biliary obstruction [K83.1]  Yes    Essential hypertension [I10]  Yes      Resolved Hospital Problems   No resolved problems to display.        HPI  69 yo female on a background significant for HTN, an biliary obstruction with duodenal stenosis secondary to pancreatic cancer. Patient underwent ERCP with choledochoduodenostomy & duodenal stent placement. She suffered copious episodes of bilious emesis in PACU with some relief with Zofran. She reports some mild nausea a few days prior to the procedure, but no emesis. She denies any abdominal pain, but does report chronic right-sided back pain for about a year, consistent with her pancreatic cancer diagnosis; she takes Percocet 10mg PRN. She had a BM yesterday and takes Miralax daily. She denies any dizziness, dysuria or anorexia.      Hospital Course    Overview  69 yo female on a background significant for HTN, an biliary obstruction with duodenal stenosis secondary to pancreatic cancer. Patient underwent ERCP with choledochoduodenostomy & duodenal stent placement and is admitted for nausea and bilious vomiting, post-anesthesia. He symptoms resolved with anti-emetics, pantoprazole and clear liquid diet as tolerated.      By Problem:     Pancreas cancer  Biliary obstruction with duodenal stenosis   Post-anesthesia nausea and vomiting  · Nausea and vomiting following scheduled ERCP: "Succesful palliation of malignant biliary and gastric obstruction with choledochoduodenstomy and duodenal stent placement". Continue 1 week-course of prophylactic ciprofloxacin " PO 500mg BID.    · Ordered scheduled Reglan and PRN Zofran. Analgesia: PRN Dilaudid.  · Ordered NS @ 100mL hr, IV PPI x1 and resuming home pantoprazole. Clear liquid as tolerated.   · Work-up negative for pancreatitis, no underlying infectious process and with improving T.bili and LFTs s/p ERCP. N/V resolved; Plan to discharge on PRN Phenergan. (2/2)     Essential Hypertension  · Resumed home anti-hypertensives: irbesartan, brisolol, HCTZ, and hydralazine.      Hypokalemia  · Replete as needed.      Recent Labs   Lab 02/01/19 1716   WBC 8.20   HGB 9.9*   HCT 32.6*        Recent Labs   Lab 02/01/19 1716 02/02/19  0502    138   K 3.5 3.0*    102   CO2 26 26   BUN 19 19   CREATININE 0.9 1.0   * 144*   CALCIUM 9.2 8.9   PHOS  --  3.3   LIPASE 34  --    AMYLASE 24  --      Recent Labs   Lab 02/01/19 1716 02/02/19  0502   ALKPHOS 648* 544*   ALT 92* 75*   * 65*   ALBUMIN 2.1* 2.1*   PROT 6.3 6.0   BILITOT 13.6* 9.8*      Procedures: ERCP with choledochoduodenostomy & duodenal stent placement    Consultants: Gastroenterology.     Current Discharge Medication List      START taking these medications    Details   ciprofloxacin HCl (CIPRO) 500 MG tablet Take 1 tablet (500 mg total) by mouth 2 (two) times daily for 7 days  Qty: 14 tablet, Refills: 0      promethazine (PHENERGAN) 25 MG tablet Take 1 tablet (25 mg total) by mouth every 6 (six) hours as needed for Nausea.  Qty: 60 tablet, Refills: 0         CONTINUE these medications which have CHANGED    Details   oxyCODONE-acetaminophen (PERCOCET)  mg per tablet Take 1 tablet by mouth every 6 (six) hours as needed.  Qty: 60 tablet, Refills: 0         CONTINUE these medications which have NOT CHANGED    Details   bisoprolol-hydrochlorothiazide (ZIAC) 10-6.25 mg per tablet Take 1 tablet by mouth 2 (two) times daily.  Refills: 1      hydrALAZINE (APRESOLINE) 50 MG tablet Take 75 mg by mouth 3 (three) times daily.  Refills: 1       irbesartan (AVAPRO) 150 MG tablet Take 150 mg by mouth every evening.  Refills: 0      LORazepam (ATIVAN) 1 MG tablet Take 1 mg by mouth every 8 (eight) hours as needed for Anxiety.      ondansetron (ZOFRAN-ODT) 8 MG TbDL 8 mg every 6 (six) hours as needed.  Refills: 0      pantoprazole (PROTONIX) 40 MG tablet Take 40 mg by mouth once daily.  Refills: 1         STOP taking these medications       amoxicillin (AMOXIL) 250 mg/5 mL suspension Comments:   Reason for Stopping:               Discharge Diet:regular diet as tolerated with Normal Fluid intake of 1500 - 2000 mL per day    Activity: activity as tolerated    Discharge Condition: Stable    Disposition: Home or Self Care    Tests pending at the time of discharge: none      Time spent  on the discharge of the patient including review of hospital course with the patient. reviewing discharge medications and arranging follow-up care     Discharge examination Patient was seen and examined on the date of discharge and determined to be suitable for discharge.    Discharge plan     Future Appointments   Date Time Provider Department Center   2/4/2019  8:40 AM Rigoberto Mcarthur MD [facility] HEM ONC [facility] [place]     Bebe Guido MD    Scribe Attestation: I personally scribed for Bebe Guido MD on 02/02/2019 at 1:50 PM. Electronically signed by jane Navas on 02/02/2019 at 1:50 PM.

## 2019-02-02 NOTE — H&P
Physician Attestation for Scribe:  I, Bebe Guido MD, personally performed the services described in this documentation. All medical record entries made by the scribe were at my direction and in my presence.  I have reviewed the chart and agree that the record reflects my personal performance and is accurate and complete.   Bebe Guido MD    [facility] Medicine  History and Physical Exam    Team: [facility] MED D Bebe Guido MD  Admit Date: 2/1/2019   Chief complaint: Nausea and vomiting post-anesthesia    Patient information was obtained from patient and anesthesiology .   Primary care Physician: Maikel Cesar MD  Code status: Full Code    HPI:   69 yo female on a background significant for HTN, an biliary obstruction with duodenal stenosis secondary to pancreatic cancer. Patient underwent ERCP with choledochoduodenostomy & duodenal stent placement. She suffered copious episodes of bilious emesis in PACU with some relief with Zofran. She reports some mild nausea a few days prior to the procedure, but no emesis. She denies any abdominal pain, but does report chronic right-sided back pain for about a year, consistent with her pancreatic cancer diagnosis; she takes Percocet 10mg PRN. She had a BM yesterday and takes Miralax daily. She denies any dizziness, dysuria or anorexia.      Past Medical History: Patient has a past medical history of Biliary obstruction, Duodenal stenosis, Hypertension, and Jaundice.    Past Surgical History: Patient has a past surgical history that includes Hysterectomy; Foot surgery (Right, 2012); Esophagogastroduodenoscopy (N/A, 1/25/2019); endoscopic ultrasound of upper gastrointestinal tract (N/A, 1/25/2019); and ERCP (N/A, 1/25/2019).    Social History: Patient reports that  has never smoked. she has never used smokeless tobacco. She reports that she does not drink alcohol or use drugs.    Family History: family history includes Cancer in her cousin, paternal aunt, and paternal uncle;  Heart disease in her mother; Stroke in her father.    Medications:   Prior to Admission medications    Medication Sig Start Date End Date Taking? Authorizing Provider   bisoprolol-hydrochlorothiazide (ZIAC) 10-6.25 mg per tablet Take 1 tablet by mouth 2 (two) times daily. 12/17/18  Yes Historical Provider, MD   hydrALAZINE (APRESOLINE) 50 MG tablet Take 75 mg by mouth 3 (three) times daily. 11/5/18  Yes Historical Provider, MD   irbesartan (AVAPRO) 150 MG tablet Take 150 mg by mouth every evening. 1/16/19  Yes Historical Provider, MD   LORazepam (ATIVAN) 1 MG tablet Take 1 mg by mouth every 8 (eight) hours as needed for Anxiety.   Yes Historical Provider, MD   ondansetron (ZOFRAN-ODT) 8 MG TbDL 8 mg every 6 (six) hours as needed. 1/16/19  Yes Historical Provider, MD   pantoprazole (PROTONIX) 40 MG tablet Take 40 mg by mouth once daily. 1/16/19  Yes Historical Provider, MD   oxyCODONE-acetaminophen (PERCOCET)  mg per tablet Take 1 tablet by mouth every 6 (six) hours as needed. 1/21/19 2/1/19 Yes Anh Hampton MD   amoxicillin (AMOXIL) 250 mg/5 mL suspension TAKE 2 TEASPOONSFUL BY MOUTH 3 TIMES A DAY FOR 7 DAYS 1/16/19   Historical Provider, MD   ciprofloxacin HCl (CIPRO) 500 MG tablet Take 1 tablet (500 mg total) by mouth 2 (two) times daily for 7 days 2/1/19 2/8/19  Celestino Christianson MD   oxyCODONE-acetaminophen (PERCOCET)  mg per tablet Take 1 tablet by mouth every 6 (six) hours as needed. 2/1/19 3/3/19  Celestino Christianson MD       Allergies: Patient has No Known Allergies.    ROS    Constitutional: no fever or chills  Respiratory: no cough or shortness of breath  Cardiovascular: no chest pain or palpitations  Gastrointestinal: (+) nausea/vomiting, no abdominal pain or change in bowel habits  Genitourinary: no hematuria or dysuria  Integument/Breast: no rash or pruritis  Hematologic/Lymphatic: no easy bruising or lymphadenopathy  Musculoskeletal: no arthralgias or myalgias, (+) chronic back  pain  Neurological: no seizures or tremors  Behavioral/Psych: no depression or anxiety    PEx  Temp:  [97.7 °F (36.5 °C)-98.7 °F (37.1 °C)]   Pulse:  [63-78]   Resp:  [15-21]   BP: (127-202)/(65-93)   SpO2:  [95 %-100 %]   Body mass index is 31 kg/m².     General appearance: no distress, (+) jaundice.  Mental status: Alert and oriented x 3  HEENT:  conjunctivae/corneas clear, PERRL, (+) icterus.   Neck: supple, thyroid not enlarged  Pulm:   normal respiratory effort, CTA B, no c/w/r  Card: RRR, no murmur  Abd: (+) Dry mucous membranes, distension & hypoactive BS; no masses, no organomegaly  Ext: no edema  Pulses: 2+, symmetric  Skin: texture, turgor normal. No rashes or lesions  Neuro: CN II-XII grossly intact, no focal numbness or weakness, normal strength and tone     Active Hospital Problems    Diagnosis  POA    Pancreas cancer [C25.9]  Yes      Resolved Hospital Problems   No resolved problems to display.       Overview:  69 yo female on a background significant for HTN, an biliary obstruction with duodenal stenosis secondary to pancreatic cancer. Patient underwent ERCP with choledochoduodenostomy & duodenal stent placement and is admitted for nausea and bilious vomiting, post-anesthesia.     Assessment and Plan:    Pancreas cancer  Biliary obstruction with duodenal stenosis  Severe Post-anesthesia nausea and vomiting  · Nausea and vomiting following scheduled ERCP: Succesful palliation of malignant biliary and gastric obstruction with choledochoduodenstomy and duodenal stent placement. Continue 1 week-course of prophylactic ciprofloxacin PO 500mg BID.    · Work-up pending: CMP, CBC, UA, lipase, amylase.  · Ordered scheduled Reglan and PRN Zofran. Analgesia: PRN Dilaudid.  · Ordered NS @ 100mL hr, IV PPI x1 and resuming home pantoprazole. Clear liquid as tolerated.     Essential Hypertension  · Resumed home anti-hypertensives: irbesartan, brisolol, HCTZ, and hydralazine.      Diet:  Diet clear liquid  GI  PPx: Pantoprazole.  DVT PPx: TEDs   Lines: PIV  Drains: None    Goals of Care: Curative  Discharge plan: TBD    Time (minutes) spent in care of the patient (Greater than 1/2 spent in direct face-to-face contact) 35min    Bebe Guido MD    Scribe Attestation: I personally scribed for Celestino Christianson MD on 02/02/2019 at 4:30 PM. Electronically signed by jane Navas on 02/02/2019 at 4:30 PM.

## 2019-02-02 NOTE — PLAN OF CARE
Problem: Adult Inpatient Plan of Care  Goal: Plan of Care Review  Outcome: Ongoing (interventions implemented as appropriate)  Intractable vomiting with green-bile liquid at the beginning of the shift, given IV reglan and phenergan with good effect. Complaining of pain on the back (chronic), given percocet when N&V settled with good effect, PRN toradol given as well.  IV maintenance fluid infused as ordered, patient kept on clear liquids. Patient ambulated to the bathroom, voiding spontaneously. Safety and fall precaution maintained: no fall or injury occurred. Bed at low and locked position, call bell within reach. Promoted rest and sleep, will continue to monitor.

## 2019-02-03 LAB — BACTERIA UR CULT: NORMAL

## 2019-02-04 ENCOUNTER — PATIENT MESSAGE (OUTPATIENT)
Dept: HEMATOLOGY/ONCOLOGY | Facility: CLINIC | Age: 69
End: 2019-02-04

## 2019-02-04 ENCOUNTER — LAB VISIT (OUTPATIENT)
Dept: LAB | Facility: HOSPITAL | Age: 69
End: 2019-02-04
Attending: INTERNAL MEDICINE
Payer: COMMERCIAL

## 2019-02-04 ENCOUNTER — INITIAL CONSULT (OUTPATIENT)
Dept: HEMATOLOGY/ONCOLOGY | Facility: CLINIC | Age: 69
End: 2019-02-04
Payer: COMMERCIAL

## 2019-02-04 ENCOUNTER — RESEARCH ENCOUNTER (OUTPATIENT)
Dept: RESEARCH | Facility: HOSPITAL | Age: 69
End: 2019-02-04

## 2019-02-04 VITALS
SYSTOLIC BLOOD PRESSURE: 160 MMHG | BODY MASS INDEX: 30.79 KG/M2 | WEIGHT: 173.75 LBS | TEMPERATURE: 99 F | HEIGHT: 63 IN | DIASTOLIC BLOOD PRESSURE: 79 MMHG | RESPIRATION RATE: 18 BRPM | HEART RATE: 94 BPM | OXYGEN SATURATION: 98 %

## 2019-02-04 DIAGNOSIS — C25.9 MALIGNANT NEOPLASM OF PANCREAS, UNSPECIFIED LOCATION OF MALIGNANCY: ICD-10-CM

## 2019-02-04 DIAGNOSIS — R63.0 ANOREXIA: ICD-10-CM

## 2019-02-04 DIAGNOSIS — C25.9 PANCREATIC ADENOCARCINOMA: ICD-10-CM

## 2019-02-04 DIAGNOSIS — R63.4 WEIGHT LOSS: ICD-10-CM

## 2019-02-04 DIAGNOSIS — K83.1 BILIARY DISEASE WITH OBSTRUCTION: Primary | ICD-10-CM

## 2019-02-04 DIAGNOSIS — R17 JAUNDICE: ICD-10-CM

## 2019-02-04 DIAGNOSIS — I10 ESSENTIAL HYPERTENSION: ICD-10-CM

## 2019-02-04 LAB
ALBUMIN SERPL BCP-MCNC: 2.4 G/DL
ALP SERPL-CCNC: 426 U/L
ALT SERPL W/O P-5'-P-CCNC: 57 U/L
ANION GAP SERPL CALC-SCNC: 8 MMOL/L
AST SERPL-CCNC: 52 U/L
BASOPHILS # BLD AUTO: 0.17 K/UL
BASOPHILS NFR BLD: 2 %
BILIRUB DIRECT SERPL-MCNC: 5.6 MG/DL
BILIRUB SERPL-MCNC: 7.7 MG/DL
BUN SERPL-MCNC: 12 MG/DL
CALCIUM SERPL-MCNC: 9.2 MG/DL
CANCER AG19-9 SERPL-ACNC: ABNORMAL U/ML
CHLORIDE SERPL-SCNC: 99 MMOL/L
CO2 SERPL-SCNC: 29 MMOL/L
CREAT SERPL-MCNC: 0.9 MG/DL
DIFFERENTIAL METHOD: ABNORMAL
EOSINOPHIL # BLD AUTO: 1.3 K/UL
EOSINOPHIL NFR BLD: 15.4 %
ERYTHROCYTE [DISTWIDTH] IN BLOOD BY AUTOMATED COUNT: 19.5 %
EST. GFR  (AFRICAN AMERICAN): >60 ML/MIN/1.73 M^2
EST. GFR  (NON AFRICAN AMERICAN): >60 ML/MIN/1.73 M^2
GLUCOSE SERPL-MCNC: 209 MG/DL
HCT VFR BLD AUTO: 33.4 %
HGB BLD-MCNC: 10.4 G/DL
IMM GRANULOCYTES # BLD AUTO: 0.02 K/UL
IMM GRANULOCYTES NFR BLD AUTO: 0.2 %
LYMPHOCYTES # BLD AUTO: 0.7 K/UL
LYMPHOCYTES NFR BLD: 8.3 %
MCH RBC QN AUTO: 27.2 PG
MCHC RBC AUTO-ENTMCNC: 31.1 G/DL
MCV RBC AUTO: 87 FL
MONOCYTES # BLD AUTO: 0.7 K/UL
MONOCYTES NFR BLD: 7.7 %
NEUTROPHILS # BLD AUTO: 5.7 K/UL
NEUTROPHILS NFR BLD: 66.4 %
NRBC BLD-RTO: 0 /100 WBC
PLATELET # BLD AUTO: 278 K/UL
PMV BLD AUTO: 11.4 FL
POTASSIUM SERPL-SCNC: 3.1 MMOL/L
PROT SERPL-MCNC: 6.7 G/DL
RBC # BLD AUTO: 3.82 M/UL
SODIUM SERPL-SCNC: 136 MMOL/L
WBC # BLD AUTO: 8.52 K/UL

## 2019-02-04 PROCEDURE — 1101F PT FALLS ASSESS-DOCD LE1/YR: CPT | Mod: CPTII,S$GLB,, | Performed by: INTERNAL MEDICINE

## 2019-02-04 PROCEDURE — 99205 PR OFFICE/OUTPT VISIT, NEW, LEVL V, 60-74 MIN: ICD-10-PCS | Mod: S$GLB,,, | Performed by: INTERNAL MEDICINE

## 2019-02-04 PROCEDURE — 86301 IMMUNOASSAY TUMOR CA 19-9: CPT

## 2019-02-04 PROCEDURE — 3078F PR MOST RECENT DIASTOLIC BLOOD PRESSURE < 80 MM HG: ICD-10-PCS | Mod: CPTII,S$GLB,, | Performed by: INTERNAL MEDICINE

## 2019-02-04 PROCEDURE — 85025 COMPLETE CBC W/AUTO DIFF WBC: CPT

## 2019-02-04 PROCEDURE — 82248 BILIRUBIN DIRECT: CPT

## 2019-02-04 PROCEDURE — 3078F DIAST BP <80 MM HG: CPT | Mod: CPTII,S$GLB,, | Performed by: INTERNAL MEDICINE

## 2019-02-04 PROCEDURE — 80053 COMPREHEN METABOLIC PANEL: CPT

## 2019-02-04 PROCEDURE — 99999 PR PBB SHADOW E&M-EST. PATIENT-LVL IV: ICD-10-PCS | Mod: PBBFAC,,, | Performed by: INTERNAL MEDICINE

## 2019-02-04 PROCEDURE — 99205 OFFICE O/P NEW HI 60 MIN: CPT | Mod: S$GLB,,, | Performed by: INTERNAL MEDICINE

## 2019-02-04 PROCEDURE — 1101F PR PT FALLS ASSESS DOC 0-1 FALLS W/OUT INJ PAST YR: ICD-10-PCS | Mod: CPTII,S$GLB,, | Performed by: INTERNAL MEDICINE

## 2019-02-04 PROCEDURE — 99999 PR PBB SHADOW E&M-EST. PATIENT-LVL IV: CPT | Mod: PBBFAC,,, | Performed by: INTERNAL MEDICINE

## 2019-02-04 PROCEDURE — 3077F PR MOST RECENT SYSTOLIC BLOOD PRESSURE >= 140 MM HG: ICD-10-PCS | Mod: CPTII,S$GLB,, | Performed by: INTERNAL MEDICINE

## 2019-02-04 PROCEDURE — 36415 COLL VENOUS BLD VENIPUNCTURE: CPT

## 2019-02-04 PROCEDURE — 3077F SYST BP >= 140 MM HG: CPT | Mod: CPTII,S$GLB,, | Performed by: INTERNAL MEDICINE

## 2019-02-04 RX ORDER — ONDANSETRON HYDROCHLORIDE 8 MG/1
8 TABLET, FILM COATED ORAL EVERY 8 HOURS PRN
Qty: 30 TABLET | Refills: 2 | Status: SHIPPED | OUTPATIENT
Start: 2019-02-04 | End: 2019-03-02 | Stop reason: SDUPTHER

## 2019-02-04 RX ORDER — MORPHINE SULFATE 15 MG/1
15 TABLET ORAL EVERY 4 HOURS PRN
Qty: 60 TABLET | Refills: 0 | Status: SHIPPED | OUTPATIENT
Start: 2019-02-04 | End: 2019-02-13 | Stop reason: SDUPTHER

## 2019-02-04 NOTE — PROGRESS NOTES
Pt and family members were approached in [facility] Onc clinic regarding participation in protocol IRB #2015.101.C, project [id number]. Pt was agreeable.     The ICF was reviewed with pt. The discussion included:   - participation is voluntary;   - pt can change her mind about participating up until the samples are collected;  - if she changes his mind about participation after collection we cannot get that sample back from sponsor;   - samples that have been used prior to her notification will still be included in research;   - specimens collected include only those discussed with the patient at the time of consent and are indicated on the ICF;    - Blood samples will be collected at a lab visit after her routine tests are conducted;  - Dr. Mcarthur will conduct treatment per his usual protocol -    - specimens will be given a unique code that can only be linked to pt by [employer] staff;  - all medical information released to researchers will be stripped of identifiers;   - there is a small risk of loss of confidentiality, but we make every effort to ensure privacy;  - no other physical risks outside of those involved in standard of care procedure.      Pt was informed that participation in this study would not preclude her from participating in any drug study/other research if offered.   Pt did not have any questions. Pt willingly and independently signed ICF.    A copy of signed ICF was given to pt with instructions to call with any questions that may arise or if she should change her mind regarding participation in [employer] program.

## 2019-02-04 NOTE — Clinical Note
--cbc, cmp, direct bili, ca 19-9 today--pet ct this wk--nutrition referral--cbc, cmp, direct bili on 2/8

## 2019-02-04 NOTE — LETTER
February 4, 2019      Celestino Simpson MD  [address]  [facility] [address]           [facility] Hematology Oncology  [address]  [facility] [address]  Phone: [phone]          Patient: Roopa Hanley   MR Number: [id number]   YOB: 1950   Date of Visit: 2/4/2019       Dear Dr. Celestino Simpson:    Thank you for referring Roopa Hanley to me for evaluation. Attached you will find relevant portions of my assessment and plan of care.    If you have questions, please do not hesitate to call me. I look forward to following Roopa Hanley along with you.    Sincerely,    Rigoberto Mcarthur MD    Enclosure  CC:  No Recipients    If you would like to receive this communication electronically, please contact [email or web address] or [phone] to request more information on [employer] [facility] [employer] access.    For providers and/or their staff who would like to refer a patient to [facility], please contact us through our one-stop-shop provider referral line, [facility], at [phone].    If you feel you have received this communication in error or would no longer like to receive these types of communications, please e-mail [email or web address]

## 2019-02-06 NOTE — PROGRESS NOTES
[facility] UGI MULTIDISCIPLINARY TUMOR BOARD  PATIENT REVIEW FORM   ____________________________________________________________    CLINIC #: [id number]  DATE: 2/11/2019    DIAGNOSIS:   Pancreatic JAMEE    PRESENTER:   Dr. 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  1/25/19 EUS per Dr. Martinez staged T3 Nx Mx by endosonographic criteria.  2/1/19 ERCP per Dr. Christianson with stent placement  2/4/19 seen by Dr. Mcarthur for neoadj chemotherapy    BOARD RECOMMENDATIONS: neoadj tx with hem onc when tbili level appropriate    CONSULT NEEDED:     [] Surgery    [x] Hem/Onc    [x] Rad/Onc    [] Dietary                 [] Social Service    [] Psychology       [] AES  [] Radiology     Clinical Stage: Tumor   3   Node(s)  X  Metastasis x      GROUP STAGE:  [] O    [] 1A    [] IB    [] IIA    [] IIB     [] IIIA     [] IIIB     [] IIIC    []IV                              Metastatic site(s): 0         [x] Rehana'l Treatment Guidelines reviewed and care planned is consistent with guidelines.         (i.e., NCCN, NCI, PD, ACO, AUA, etc.)    PRESENTATION AT CANCER CONFERENCE:         [x] Prospective    [] Retrospective     [] Follow-Up            [x] Eligible for clinical trial

## 2019-02-08 ENCOUNTER — HOSPITAL ENCOUNTER (OUTPATIENT)
Dept: RADIOLOGY | Facility: HOSPITAL | Age: 69
Discharge: HOME | End: 2019-02-08
Attending: INTERNAL MEDICINE
Payer: COMMERCIAL

## 2019-02-08 DIAGNOSIS — C25.9 PANCREATIC ADENOCARCINOMA: Primary | ICD-10-CM

## 2019-02-08 DIAGNOSIS — C25.9 MALIGNANT NEOPLASM OF PANCREAS, UNSPECIFIED LOCATION OF MALIGNANCY: ICD-10-CM

## 2019-02-08 LAB
POCT GLUCOSE: 193 MG/DL (ref 70–110)
POCT GLUCOSE: 200 MG/DL (ref 70–110)

## 2019-02-08 PROCEDURE — 78815 NM PET CT ROUTINE: ICD-10-PCS | Mod: 26,PI,, | Performed by: RADIOLOGY

## 2019-02-08 PROCEDURE — 78815 PET IMAGE W/CT SKULL-THIGH: CPT | Mod: 26,PI,, | Performed by: RADIOLOGY

## 2019-02-08 PROCEDURE — A9552 F18 FDG: HCPCS

## 2019-02-08 PROCEDURE — 78815 PET IMAGE W/CT SKULL-THIGH: CPT | Mod: TC

## 2019-02-11 ENCOUNTER — CLINICAL SUPPORT (OUTPATIENT)
Dept: HEMATOLOGY/ONCOLOGY | Facility: CLINIC | Age: 69
End: 2019-02-11
Payer: COMMERCIAL

## 2019-02-11 ENCOUNTER — TUMOR BOARD CONFERENCE (OUTPATIENT)
Dept: SURGERY | Facility: CLINIC | Age: 69
End: 2019-02-11

## 2019-02-11 VITALS — BODY MASS INDEX: 30.35 KG/M2 | WEIGHT: 171.31 LBS | HEIGHT: 63 IN

## 2019-02-11 DIAGNOSIS — Z00.6 EXAMINATION OF PARTICIPANT IN CLINICAL TRIAL: ICD-10-CM

## 2019-02-11 DIAGNOSIS — C25.9 PANCREATIC ADENOCARCINOMA: ICD-10-CM

## 2019-02-11 DIAGNOSIS — Z71.3 NUTRITIONAL COUNSELING: Primary | ICD-10-CM

## 2019-02-11 DIAGNOSIS — K83.1 BILIARY DISEASE WITH OBSTRUCTION: Primary | ICD-10-CM

## 2019-02-11 DIAGNOSIS — E43 SEVERE PROTEIN-CALORIE MALNUTRITION: ICD-10-CM

## 2019-02-11 PROCEDURE — 97802 MEDICAL NUTRITION INDIV IN: CPT | Mod: S$GLB,,, | Performed by: DIETITIAN, REGISTERED

## 2019-02-11 PROCEDURE — 97802 PR MED NUTR THER, 1ST, INDIV, EA 15 MIN: ICD-10-PCS | Mod: S$GLB,,, | Performed by: DIETITIAN, REGISTERED

## 2019-02-11 PROCEDURE — 99999 PR PBB SHADOW E&M-EST. PATIENT-LVL II: ICD-10-PCS | Mod: PBBFAC,,, | Performed by: DIETITIAN, REGISTERED

## 2019-02-11 PROCEDURE — 99999 PR PBB SHADOW E&M-EST. PATIENT-LVL II: CPT | Mod: PBBFAC,,, | Performed by: DIETITIAN, REGISTERED

## 2019-02-11 NOTE — ANESTHESIA RELEASE NOTE
"Anesthesia Release from PACU Note    Patient: Roopa Hanley    Procedure(s) Performed: Procedure(s) (LRB):  EGD (ESOPHAGOGASTRODUODENOSCOPY) (N/A)  ULTRASOUND, UPPER GI TRACT, ENDOSCOPIC (N/A)    Anesthesia type: general    Post pain: Adequate analgesia    Post assessment: no apparent anesthetic complications and tolerated procedure well    Last Vitals:   Visit Vitals  BP (!) 180/74 (BP Location: Right arm, Patient Position: Lying)   Pulse 73   Temp 37.2 °C (98.9 °F) (Oral)   Resp 18   Ht 5' 3" (1.6 m)   Wt 79.4 kg (175 lb)   SpO2 96%   Breastfeeding? No   BMI 31.00 kg/m²       Post vital signs: stable    Level of consciousness: awake and alert     Nausea/Vomiting: no nausea/no vomiting    Complications: none    Airway Patency: patent    Respiratory: unassisted, spontaneous ventilation, room air    Cardiovascular: stable and blood pressure at baseline    Hydration: euvolemic  "

## 2019-02-11 NOTE — ANESTHESIA POSTPROCEDURE EVALUATION
"Anesthesia Post Evaluation    Patient: Roopa Hanley    Procedure(s) Performed: Procedure(s) (LRB):  EGD (ESOPHAGOGASTRODUODENOSCOPY) (N/A)  ULTRASOUND, UPPER GI TRACT, ENDOSCOPIC (N/A)    Final Anesthesia Type: general  Patient location during evaluation: [facility]  Patient participation: Yes- Able to Participate  Level of consciousness: awake and alert  Post-procedure vital signs: reviewed and stable  Pain management: adequate  Airway patency: patent  PONV status at discharge: No PONV  Anesthetic complications: no      Cardiovascular status: hemodynamically stable  Respiratory status: unassisted, spontaneous ventilation and room air  Hydration status: euvolemic  Follow-up not needed.        Visit Vitals  BP (!) 180/74 (BP Location: Right arm, Patient Position: Lying)   Pulse 73   Temp 37.2 °C (98.9 °F) (Oral)   Resp 18   Ht 5' 3" (1.6 m)   Wt 79.4 kg (175 lb)   SpO2 96%   Breastfeeding? No   BMI 31.00 kg/m²       Pain/Aline Score: No Data Recorded      "

## 2019-02-11 NOTE — PROGRESS NOTES
"Medical Nutrition Therapy Oncology Progress Note    Name: Roopa Hanley MRN: [id number]  : 1950    Age: 68 y.o.  Ethnicity: /White Language: English    Diagnosis: No diagnosis found.    Chemo Regimen: unknown   Referring MD: Dr. Mcarthur Frequency:  Radiation: unknown           Goal of Cancer treatment n/a         Nutrition Assessment     Chief Complaint:   Chief Complaint   Patient presents with    Nutrition Counseling    Pancreatic Cancer        Anthropometric Measurements:  Height: 5' 3" (1.6 m)  Current Weight: 77.7 kg (171 lb 4.8 oz)  Ideal Body Weight: 115#  Percent Ideal Body Weight:: 148%  BMI: Body mass index is 30.34 kg/m².     Weight History:   Wt Readings from Last 8 Encounters:   19 77.7 kg (171 lb 4.8 oz)   19 78.8 kg (173 lb 11.6 oz)   19 79.4 kg (175 lb)   19 81.6 kg (180 lb)   19 85.1 kg (187 lb 9.8 oz)     Medical Health History:  Feeding tube placed: No  Pre-treatment: Yes    Past Surgical History:   Procedure Laterality Date    EGD (ESOPHAGOGASTRODUODENOSCOPY) N/A 2019    [facility]    EGD (ESOPHAGOGASTRODUODENOSCOPY) N/A 2019    [facility] by [facility] at [facility] ([facility]ND FLR)    ERCP (ENDOSCOPIC RETROGRADE CHOLANGIOPANCREATOGRAPHY) N/A 2019    [facility] at [facility] ([facility]ND FLR)    FOOT SURGERY Right 2012    HYSTERECTOMY      partial    ULTRASOUND, UPPER GI TRACT, ENDOSCOPIC N/A 2019    [facility] ([facility] FLR)    ULTRASOUND, UPPER GI TRACT, ENDOSCOPIC N/A 2019    Performed by [facility] at [facility] (2ND FLR)        Medications:   Current Outpatient Medications:     bisoprolol-hydrochlorothiazide (ZIAC) 10-6.25 mg per tablet, Take 1 tablet by mouth 2 (two) times daily., Disp: , Rfl: 1    hydrALAZINE (APRESOLINE) 50 MG tablet, Take 75 mg by mouth 3 (three) times daily., Disp: , Rfl: 1    irbesartan (AVAPRO) 150 " MG tablet, Take 150 mg by mouth every evening., Disp: , Rfl: 0    LORazepam (ATIVAN) 1 MG tablet, Take 1 mg by mouth every 8 (eight) hours as needed for Anxiety., Disp: , Rfl:     morphine (MSIR) 15 MG tablet, Take 1 tablet (15 mg total) by mouth every 4 (four) hours as needed for Pain., Disp: 60 tablet, Rfl: 0    ondansetron (ZOFRAN) 8 MG tablet, Take 1 tablet (8 mg total) by mouth every 8 (eight) hours as needed for Nausea., Disp: 30 tablet, Rfl: 2    oxyCODONE-acetaminophen (PERCOCET)  mg per tablet, Take 1 tablet by mouth every 6 (six) hours as needed., Disp: 60 tablet, Rfl: 0    pantoprazole (PROTONIX) 40 MG tablet, Take 40 mg by mouth once daily., Disp: , Rfl: 1    promethazine (PHENERGAN) 25 MG tablet, Take 1 tablet (25 mg total) by mouth every 6 (six) hours as needed for Nausea., Disp: 60 tablet, Rfl: 0    Last Labs:  Last Labs:  Glucose   Date Value Ref Range Status   02/08/2019 197 (H) 70 - 110 mg/dL Final   02/04/2019 209 (H) 70 - 110 mg/dL Final     BUN, Bld   Date Value Ref Range Status   02/08/2019 21 8 - 23 mg/dL Final   02/04/2019 12 8 - 23 mg/dL Final     Creatinine   Date Value Ref Range Status   02/08/2019 1.5 (H) 0.5 - 1.4 mg/dL Final   02/04/2019 0.9 0.5 - 1.4 mg/dL Final     Sodium   Date Value Ref Range Status   02/08/2019 139 136 - 145 mmol/L Final   02/04/2019 136 136 - 145 mmol/L Final     Potassium   Date Value Ref Range Status   02/08/2019 3.6 3.5 - 5.1 mmol/L Final   02/04/2019 3.1 (L) 3.5 - 5.1 mmol/L Final     Phosphorus   Date Value Ref Range Status   02/02/2019 3.3 2.7 - 4.5 mg/dL Final     Calcium   Date Value Ref Range Status   02/08/2019 9.2 8.7 - 10.5 mg/dL Final   02/04/2019 9.2 8.7 - 10.5 mg/dL Final     Prealbumin   Date Value Ref Range Status   01/18/2019 12 (L) 20 - 43 mg/dL Final     Total Protein   Date Value Ref Range Status   02/08/2019 6.4 6.0 - 8.4 g/dL Final   02/04/2019 6.7 6.0 - 8.4 g/dL Final     No results found for: CHOL  No results found for:  "HGBA1C  Hemoglobin   Date Value Ref Range Status   02/08/2019 9.3 (L) 12.0 - 16.0 g/dL Final   02/04/2019 10.4 (L) 12.0 - 16.0 g/dL Final     Hematocrit   Date Value Ref Range Status   02/08/2019 29.8 (L) 37.0 - 48.5 % Final   02/04/2019 33.4 (L) 37.0 - 48.5 % Final     No results found for: IRON  No components found for: FROLATE  No results found for: [id number]  WBC   Date Value Ref Range Status   02/08/2019 9.84 3.90 - 12.70 K/uL Final   02/04/2019 8.52 3.90 - 12.70 K/uL Final         Client History/Food Access:    Living Situation: Lives alone   Who: Shops for Groceries? Patient and Family   Who : Prepares meals? Patient and Family   Who: Fills prescriptions? Patient   Are there financial difficulties purchasing food? No   Personal History (occupation, physical activity level, exercise):        Baseline for Outcomes Monitoring  Food and Nutrition History: Pt here with daughter and cousin for nutrition counseling. Current weight of 171# which is a 3# weight loss in 1 week and a 26# weight loss in 2 months. Pt attributes weight loss to decreased appetite and poor PO intake. Pt eats 3 bites of food and feels full. Pt drinking Premier Protein and Ensure, but dislikes Ensure and feels it "goes right through her". Provided pt with sample of Boost Plus to try as well as [employer] Peptide 1.5. Discouraged Premier Protein due to low calorie content as she thought she needed to only focus on protein. Stressed importance of adequate calories and protein. Discussed soft foods and high calorie liquids that pt can consume. Encouraged small, frequent meals throughout the day. Pt has loose stools and some abdominal pain. Will speak to Dr. Mcarthur to see if pancreatic enzyme is appropriate. Briefly reviewed pancreatic enzymes, their purpose, and how to take them. Encouraged Pedialyte as oral rehydration solution. Discussed smoothies and Smoothie Amandeep and encouraged pt to avoid smoothie if they contribute to her loose stools. Pt " "complains of smell changes. Provided pt with handout on taste and smell changes. Pt concerned about high blood pressure and elevated blood sugars. Explained that the main focus is weight maintenance and side effect management. Encouraged pt to keep blood sugars and blood pressure in mind, but because of decreased intake, I don't think pt is "overdoing" salt or sugar. Reviewed medications, supplement/herbal intake and no food/med interactions noted. Lab results noted--Glu 197H, creat 1.5H. Compliance is fair. Comprehension is fair.      Nutritional Needs:  Estimated Needs Method Use    1800 kcal/day [] Predictive Equation: Delgado-Whitehouse   [x]  30 kcal/kg   Protein 70 g 1.2 gm/kg/day   Fluid 1800 ml 30 ml/kg/day     Food/Nutrient Intake (oral, enteral or parenteral) and Patient Behaviors     Calorie intake: Inadequate   Protein intake: Inadequate     Yes/No    Yes Uses medical food supplements   Yes Cooking techniques to minimize fatigue   No Currently modifying food textures   No Able to maintain usual physical actiivty     Nutrition Diagnosis     Nutrition Diagnosis Related to (Etiology) As Evidenced By (Signs/Symptoms)   Malnutrition Physiological causes New diagnosis of pancreatic cancer with 13% weight loss in 2 months and less than 75% estimated intake                 Nutritional Intervention and Prescription        Nutrition Intervention Texture-modified diet, Energy-modified diet, Protein-modified diet and Schedule of food/fluids   Goals/Expected Outcomes Pt to choose high calorie, high protein soft foods and beverages to maintain weight throughout treatment.  Pt to eat 6 small, frequent meals throughout the day.   Progress Initial     Nutrition Intervention Medical food supplement: Commercial beverage   Goals/Expected Outcomes Pt to choose high calorie, high protein oral supplements like Boost Plus or [employer] Peptide 1.5.  Pt to consume at least 1/day.   Progress Initial     Nutrition Intervention " Fluid-modified diet   Goals/Expected Outcomes Pt to maintain hydration throughout treatment/with loose stools.   Progress Initial     Pt needs education? yes (see intervention)    Coordination of Nutrition Care: Comments:   Collaboration with other providers MD         Monitoring and Evaluation     Monitor: diet education needs    Next Visit: 1 month    Materials Provided:  1. RD contact information 2. Suggestions for increasing calories and protein   3. Ways to increase calories and protein 4. Pancreatic diet and nutrition booklet   5. Information on Creon          Total time: 60 minutes    Nutrition Score:      ©2010 [employer] Oncology Toolkit

## 2019-02-13 DIAGNOSIS — C25.9 MALIGNANT NEOPLASM OF PANCREAS, UNSPECIFIED LOCATION OF MALIGNANCY: ICD-10-CM

## 2019-02-13 DIAGNOSIS — C25.9 PANCREATIC ADENOCARCINOMA: Primary | ICD-10-CM

## 2019-02-13 RX ORDER — MORPHINE SULFATE 15 MG/1
30 TABLET ORAL EVERY 4 HOURS PRN
Qty: 90 TABLET | Refills: 0 | Status: SHIPPED | OUTPATIENT
Start: 2019-02-13 | End: 2019-02-14

## 2019-02-14 ENCOUNTER — LAB VISIT (OUTPATIENT)
Dept: LAB | Facility: HOSPITAL | Age: 69
End: 2019-02-14
Attending: INTERNAL MEDICINE
Payer: COMMERCIAL

## 2019-02-14 ENCOUNTER — OFFICE VISIT (OUTPATIENT)
Dept: HEMATOLOGY/ONCOLOGY | Facility: CLINIC | Age: 69
End: 2019-02-14
Payer: COMMERCIAL

## 2019-02-14 VITALS
BODY MASS INDEX: 30 KG/M2 | HEART RATE: 87 BPM | WEIGHT: 169.31 LBS | RESPIRATION RATE: 18 BRPM | SYSTOLIC BLOOD PRESSURE: 179 MMHG | DIASTOLIC BLOOD PRESSURE: 79 MMHG | OXYGEN SATURATION: 98 % | TEMPERATURE: 99 F | HEIGHT: 63 IN

## 2019-02-14 DIAGNOSIS — Z00.6 EXAMINATION OF PARTICIPANT IN CLINICAL TRIAL: ICD-10-CM

## 2019-02-14 DIAGNOSIS — C25.9 PANCREATIC ADENOCARCINOMA: Primary | ICD-10-CM

## 2019-02-14 DIAGNOSIS — K83.1 BILIARY DISEASE WITH OBSTRUCTION: ICD-10-CM

## 2019-02-14 DIAGNOSIS — C25.9 PANCREATIC ADENOCARCINOMA: ICD-10-CM

## 2019-02-14 DIAGNOSIS — I10 ESSENTIAL HYPERTENSION: ICD-10-CM

## 2019-02-14 DIAGNOSIS — R63.4 WEIGHT LOSS: ICD-10-CM

## 2019-02-14 DIAGNOSIS — K83.1 BILIARY OBSTRUCTION: ICD-10-CM

## 2019-02-14 LAB
ALBUMIN SERPL BCP-MCNC: 2.9 G/DL
ALP SERPL-CCNC: 174 U/L
ALT SERPL W/O P-5'-P-CCNC: 22 U/L
ANION GAP SERPL CALC-SCNC: 9 MMOL/L
AST SERPL-CCNC: 25 U/L
BASOPHILS # BLD AUTO: 0.07 K/UL
BASOPHILS NFR BLD: 1.1 %
BILIRUB SERPL-MCNC: 4 MG/DL
BUN SERPL-MCNC: 17 MG/DL
CALCIUM SERPL-MCNC: 9.2 MG/DL
CHLORIDE SERPL-SCNC: 101 MMOL/L
CO2 SERPL-SCNC: 32 MMOL/L
CREAT SERPL-MCNC: 1.3 MG/DL
DIFFERENTIAL METHOD: ABNORMAL
EOSINOPHIL # BLD AUTO: 0.5 K/UL
EOSINOPHIL NFR BLD: 7.8 %
ERYTHROCYTE [DISTWIDTH] IN BLOOD BY AUTOMATED COUNT: 17.6 %
EST. GFR  (AFRICAN AMERICAN): 49.4 ML/MIN/1.73 M^2
EST. GFR  (NON AFRICAN AMERICAN): 42.9 ML/MIN/1.73 M^2
GLUCOSE SERPL-MCNC: 303 MG/DL
HCT VFR BLD AUTO: 31.1 %
HGB BLD-MCNC: 9.9 G/DL
IMM GRANULOCYTES # BLD AUTO: 0.02 K/UL
IMM GRANULOCYTES NFR BLD AUTO: 0.3 %
LYMPHOCYTES # BLD AUTO: 0.8 K/UL
LYMPHOCYTES NFR BLD: 12.3 %
MCH RBC QN AUTO: 29.2 PG
MCHC RBC AUTO-ENTMCNC: 31.8 G/DL
MCV RBC AUTO: 92 FL
MONOCYTES # BLD AUTO: 0.4 K/UL
MONOCYTES NFR BLD: 5.4 %
NEUTROPHILS # BLD AUTO: 4.8 K/UL
NEUTROPHILS NFR BLD: 73.1 %
NRBC BLD-RTO: 0 /100 WBC
PLATELET # BLD AUTO: 219 K/UL
PMV BLD AUTO: 11.1 FL
POTASSIUM SERPL-SCNC: 3 MMOL/L
PROT SERPL-MCNC: 6.7 G/DL
RBC # BLD AUTO: 3.39 M/UL
SODIUM SERPL-SCNC: 142 MMOL/L
WBC # BLD AUTO: 6.52 K/UL

## 2019-02-14 PROCEDURE — 3078F DIAST BP <80 MM HG: CPT | Mod: CPTII,S$GLB,, | Performed by: INTERNAL MEDICINE

## 2019-02-14 PROCEDURE — 99214 OFFICE O/P EST MOD 30 MIN: CPT | Mod: S$GLB,,, | Performed by: INTERNAL MEDICINE

## 2019-02-14 PROCEDURE — 1101F PR PT FALLS ASSESS DOC 0-1 FALLS W/OUT INJ PAST YR: ICD-10-PCS | Mod: CPTII,S$GLB,, | Performed by: INTERNAL MEDICINE

## 2019-02-14 PROCEDURE — 99214 PR OFFICE/OUTPT VISIT, EST, LEVL IV, 30-39 MIN: ICD-10-PCS | Mod: S$GLB,,, | Performed by: INTERNAL MEDICINE

## 2019-02-14 PROCEDURE — 1101F PT FALLS ASSESS-DOCD LE1/YR: CPT | Mod: CPTII,S$GLB,, | Performed by: INTERNAL MEDICINE

## 2019-02-14 PROCEDURE — 80053 COMPREHEN METABOLIC PANEL: CPT

## 2019-02-14 PROCEDURE — 99999 PR PBB SHADOW E&M-EST. PATIENT-LVL IV: CPT | Mod: PBBFAC,,, | Performed by: INTERNAL MEDICINE

## 2019-02-14 PROCEDURE — 3077F SYST BP >= 140 MM HG: CPT | Mod: CPTII,S$GLB,, | Performed by: INTERNAL MEDICINE

## 2019-02-14 PROCEDURE — 3078F PR MOST RECENT DIASTOLIC BLOOD PRESSURE < 80 MM HG: ICD-10-PCS | Mod: CPTII,S$GLB,, | Performed by: INTERNAL MEDICINE

## 2019-02-14 PROCEDURE — 3077F PR MOST RECENT SYSTOLIC BLOOD PRESSURE >= 140 MM HG: ICD-10-PCS | Mod: CPTII,S$GLB,, | Performed by: INTERNAL MEDICINE

## 2019-02-14 PROCEDURE — 36415 COLL VENOUS BLD VENIPUNCTURE: CPT

## 2019-02-14 PROCEDURE — 99999 PR PBB SHADOW E&M-EST. PATIENT-LVL IV: ICD-10-PCS | Mod: PBBFAC,,, | Performed by: INTERNAL MEDICINE

## 2019-02-14 PROCEDURE — 85025 COMPLETE CBC W/AUTO DIFF WBC: CPT

## 2019-02-14 RX ORDER — DIPHENHYDRAMINE HYDROCHLORIDE 50 MG/ML
50 INJECTION INTRAMUSCULAR; INTRAVENOUS ONCE AS NEEDED
Status: CANCELLED | OUTPATIENT
Start: 2019-02-20 | End: 2019-02-20

## 2019-02-14 RX ORDER — FLUOROURACIL 50 MG/ML
400 INJECTION, SOLUTION INTRAVENOUS
Status: CANCELLED | OUTPATIENT
Start: 2019-02-20

## 2019-02-14 RX ORDER — SODIUM CHLORIDE 0.9 % (FLUSH) 0.9 %
10 SYRINGE (ML) INJECTION
Status: CANCELLED | OUTPATIENT
Start: 2019-02-27

## 2019-02-14 RX ORDER — EPINEPHRINE 0.3 MG/.3ML
0.3 INJECTION SUBCUTANEOUS ONCE AS NEEDED
Status: CANCELLED | OUTPATIENT
Start: 2019-02-20 | End: 2019-02-20

## 2019-02-14 RX ORDER — ATROPINE SULFATE 0.4 MG/ML
0.4 INJECTION, SOLUTION ENDOTRACHEAL; INTRAMEDULLARY; INTRAMUSCULAR; INTRAVENOUS; SUBCUTANEOUS ONCE AS NEEDED
Status: CANCELLED | OUTPATIENT
Start: 2019-02-20 | End: 2019-02-20

## 2019-02-14 RX ORDER — SODIUM CHLORIDE 0.9 % (FLUSH) 0.9 %
10 SYRINGE (ML) INJECTION
Status: CANCELLED | OUTPATIENT
Start: 2019-02-20

## 2019-02-14 RX ORDER — HEPARIN 100 UNIT/ML
500 SYRINGE INTRAVENOUS
Status: CANCELLED | OUTPATIENT
Start: 2019-02-20

## 2019-02-14 RX ORDER — MORPHINE SULFATE 15 MG/1
15 TABLET, FILM COATED, EXTENDED RELEASE ORAL 2 TIMES DAILY
Qty: 60 TABLET | Refills: 0 | Status: SHIPPED | OUTPATIENT
Start: 2019-02-14 | End: 2019-02-15 | Stop reason: SDUPTHER

## 2019-02-14 RX ORDER — MORPHINE SULFATE 15 MG/1
30 TABLET ORAL EVERY 4 HOURS PRN
Qty: 90 TABLET | Refills: 0 | Status: SHIPPED | OUTPATIENT
Start: 2019-02-14 | End: 2019-02-15 | Stop reason: SDUPTHER

## 2019-02-14 RX ORDER — HEPARIN 100 UNIT/ML
500 SYRINGE INTRAVENOUS
Status: CANCELLED | OUTPATIENT
Start: 2019-02-27

## 2019-02-14 NOTE — PROGRESS NOTES
CC: Pancreatic adenocarcinoma, follow up      HPI:  is a 67y/o lady with recently diagnosed pancreatic cancer. She has hypertension.  She has history of flank/ back pain for over a year. She describes this as constant, achy, progressively worsening . This had impaired her ADLs and interrupted her sleep. She has recent weight loss--15lb since Dec 2018. She has decreased appetite. She had also noticed change in stool to light color. She had colonoscopy on 1/8/2019, suffered complications. She was told she had perforated an ulcer. She was admitted to [facility]  for 9 days. CT scan done with oral contrast via NG tube r/t poor renal function. She was discharged 1/16/19.   She was told she had a pancreatic mass. She was evaluated here by . She continues to have jaundice, and back pain.   She is still not eating well.     Interval history: She is here after PET CT.         Review of Systems   Constitutional: Positive for malaise/fatigue and weight loss. Negative for chills and fever.   HENT: Negative for congestion, ear discharge and nosebleeds.    Eyes: Negative for blurred vision, double vision, photophobia and pain.   Respiratory: Negative for cough, hemoptysis and sputum production.    Cardiovascular: Negative for chest pain, palpitations, orthopnea and leg swelling.   Gastrointestinal: Positive for abdominal pain. Negative for blood in stool, diarrhea, heartburn, nausea and vomiting.   Genitourinary: Negative for dysuria, flank pain and urgency.   Musculoskeletal: Negative for back pain, myalgias and neck pain.   Skin: Negative for itching and rash.   Neurological: Positive for weakness. Negative for dizziness, tingling, tremors, sensory change, speech change and headaches.   Endo/Heme/Allergies: Does not bruise/bleed easily.   Psychiatric/Behavioral: Negative for depression and suicidal ideas.       Current Outpatient Medications   Medication Sig    bisoprolol-hydrochlorothiazide (ZIAC) 10-6.25 mg  per tablet Take 1 tablet by mouth 2 (two) times daily.    hydrALAZINE (APRESOLINE) 50 MG tablet Take 75 mg by mouth 3 (three) times daily.    irbesartan (AVAPRO) 150 MG tablet Take 150 mg by mouth every evening.    LORazepam (ATIVAN) 1 MG tablet Take 1 mg by mouth every 8 (eight) hours as needed for Anxiety.    morphine (MSIR) 15 MG tablet Take 2 tablets (30 mg total) by mouth every 4 (four) hours as needed for Pain.    ondansetron (ZOFRAN) 8 MG tablet Take 1 tablet (8 mg total) by mouth every 8 (eight) hours as needed for Nausea.    oxyCODONE-acetaminophen (PERCOCET)  mg per tablet Take 1 tablet by mouth every 6 (six) hours as needed.    pantoprazole (PROTONIX) 40 MG tablet Take 40 mg by mouth once daily.    promethazine (PHENERGAN) 25 MG tablet Take 1 tablet (25 mg total) by mouth every 6 (six) hours as needed for Nausea.     No current facility-administered medications for this visit.            Vitals:    02/14/19 1623   BP: (!) 179/79   Pulse: 87   Resp: 18   Temp: 99.3 °F (37.4 °C)     Physical Exam   Constitutional: She is oriented to person, place, and time. She appears well-developed.   HENT:   Head: Normocephalic and atraumatic.   Mouth/Throat: No oropharyngeal exudate.   Eyes: Pupils are equal, round, and reactive to light. No scleral icterus.   Neck: Normal range of motion.   Cardiovascular: Normal rate and regular rhythm.   No murmur heard.  Pulmonary/Chest: Effort normal and breath sounds normal. No respiratory distress. She has no wheezes. She has no rales.   Abdominal: Soft. Bowel sounds are normal. She exhibits no distension. There is no tenderness. There is no rebound.   Musculoskeletal: She exhibits no edema.   Lymphadenopathy:     She has no cervical adenopathy.   Neurological: She is alert and oriented to person, place, and time. No cranial nerve deficit.   Skin: Skin is warm.         1/21/19 CT chest, abdomen and pelvis with cont          FINDINGS:  The soft tissues at the base of  the neck are unremarkable.  The thyroid gland is normal in size and configuration.  Hypodensity in the right lateral aspect of the thyroid isthmus measures 0.7 cm.  There is no abnormal lymph node enlargement.    There is no axillary, mediastinal, or hilar lymphadenopathy.  The hilar contours are unremarkable.  The esophagus is normal in course and contour.    The thoracic aorta is normal in course, caliber, and contour without significant atherosclerotic calcifications.The heart does not appear enlarged and there is no pericardial effusion.    The trachea and proximal airways are patent.  The lungs are symmetrically expanded and demonstrate no convincing evidence of consolidation, pleural thickening, pneumothorax, or pleural fluid.  Right hemidiaphragmatic elevation.    The liver is prominent in size measuring 18.1 cm in craniocaudal dimension and slightly decreased in attenuation reflecting hepatic steatosis.  There are several hypodensities in the right hepatic lobe at the periphery, largest measuring 1.4 cm, which are favored to represent cysts.  The gallbladder is distended without evidence of calcified gallstones, or wall thickening, or character pericholecystic fluid.  There is hepatic subcapsular fluid collection posterior to the gallbladder which measures approximately 3.3 x 1.2 cm.  There is marked intra and extrahepatic biliary ductal dilatation with the common bile duct measuring up to 1.6 cm in transverse dimension.    There is an ill-defined hypodense solid lesion in the head of the pancreas measuring 4.3 x 4.5 x 4.4 cm (AP x TV x CC; axial series 3, image 114 and coronal series 601, image 52) with associated pancreatic ductal dilatation with the pancreatic duct measuring up to 1.1 cm.  Assessment of the portal vein, splenic vein, SMV demonstrates pancreatic mass surrounding the superior mesenteric vein (greater than 180 degrees), also vessel contour producing concentric narrowing of the lumen  consistent with vascular encasement.  The IVC and celiac and superior mesenteric arteries are not involved.    The stomach, spleen, pancreas, and adrenal glands are unremarkable.    The kidneys are normal in size and location.  Bilateral renal hypodensities too small to fully characterize, though may represent cysts.  There is no evidence of hydronephrosis.  The ureters appear normal in course and caliber without evidence of ureteral dilatation. The urinary bladder demonstrates no significant abnormality. Operative change of prior hysterectomy with small approximately 2 cm cystic lesion in the left adnexa likely an ovarian cyst.    The abdominal aorta is normal in course and caliber without significant atherosclerotic calcifications.    The visualized loops of small bowel show no evidence of obstruction or inflammation. Large bowel is unremarkable.  High density material in the appendix, likely an appendicolith.  There is no intraperitoneal free air.  Mild pelvic ascites.  There is no evidence of lymph node enlargement in the abdomen or pelvis.    When viewed with bone windows the osseous structures are unremarkable.  Fat containing umbilical hernia.       Impression         Ill-defined hypodense solid lesion of the head the pancreas concerning for pancreatic adenocarcinoma with associated intrahepatic and extrahepatic biliary ductal dilatation, pancreatic ductal dilatation and associated encasement of the superior mesenteric vein as detailed above.  There are multiple normal sized regional lymph nodes.    Left adnexal hypodensity, likely a cyst.  This can be followed with routine oncologic surveillance.    Additional findings of hypodense right thyroid nodule, hepatomegaly with hepatic steatosis, right hepatic cysts, bilateral renal cysts, and umbilical hernia.    RECIST SUMMARY:    Date of prior examination for comparison: Baseline study    Lesion 1: Pancreatic head mass.  4.5 cm. Series 2 image 138.  New lesion.                   1/25/19 EGD/EUS           ENDOSCOPIC FINDING: :       There is no endoscopic evidence of bezoar/food (residue) or fluid collections in the entire examined stomach (stomach appears to be  emptying).       An acquired extrinsic severe stenosis was found in the first portion of the duodenum and was traversed (with the EGD scope, but could not  be traversed with the EUS scope).       ENDOSONOGRAPHIC FINDING: :       The esophagus, stomach and duodenum and adjacent structures were visualized endosonographically. Endosonographic imaging in the visualized portion of the liver showed no lesion.       A mass was identified in the pancreatic head. The mass was  hypoechoic. The mass measured 41 mm by 35 mm in maximal  cross-sectional diameter. The outer margins were irregular. There  was sonographic evidence suggesting invasion into the superior        mesenteric vein (manifested by encasement) and peripancreatic fat. The remainder of the pancreas was examined. The endosonographic appearance of parenchyma and the upstream pancreatic duct indicated        duct dilation, a tortuous/ectatic duct and parenchymal atrophy.    Impression:           - Acquired duodenal stenosis.                        - A mass was identified in the pancreatic head. This was staged T3 Nx Mx by endosonographic  criteria. The staging applies if malignancy is confirmed.                        - No specimens collected.  Recommendation:       - Discharge patient to home (ambulatory).                        - Resume previous diet; Discharge to home                         (ambulatory); Resume outpatient medications        2/1/19 ERCP        Findings:      ENDOSCOPIC  FINDING:The examined esophagus was endoscopically normal. The entire examined stomach was endoscopically normal. The examined duodenum was endoscopically normal. A small amount of food (residue) was  found in the gastric antrum. An acquired malignant-appearing, intrinsic severe  stenosis was found in the first portion of the duodenum and was non-traversed.      ENDOSONOGRAPHIC FINDING:The esophagus stomach and duodenum and adjacent structures were visualized endosonographically. A mass was identified in the pancreatic head. There was dilation in the common hepatic duct which measured up to 15 mm. The decision was made to make a bile duct  puncture into the common bile duct from the duodenal bulb. Under EUS guidance a 19guage needle was used to puncture into the  Common hepatic duct. 20cc's of dark bile was aspirated. A cholangiogram was performed through the 19 g needle,       A guidewire was advanced through the EUS needle and into the intrahepatic biliary tree. A cautery-tipped, lumen-apposing stent delivery system was advanced across the transduodenal puncture site and into the biliary system using cautery. A 10x 10mm lumen-apposing stent (Kyron) was placed into the biliary tree, with one end in the extrahepatic duct and the other in the duodenal. Good drainage of bile and contrast was noted post stent placement. The wire remained in place and a 5fr 5cm double pigtail stent was placed across the LAMSthe stent was seen to pigtail int he common hepatic duct and in the duodenal bulb.       After succesful biliary drainage, the duodenal stricture was stented  with a 22 mm x 12 cm WallFlex stent under fluoroscopic guidance.    Impression:           Succesful palliation of malignant biliary and gastric obstruction with choledochoduodenstomy and duodenal stent placement          2/8/19 PET CT    COMPARISON:  CT chest abdomen pelvis with contrast 01/21/2019    FINDINGS:  Quality of the study: Adequate.    There is redemonstration of an ill-defined pancreatic head mass which demonstrates hypermetabolic activity (SUV max 5.78).  This mass measures at least 4.7 x 4.3 cm with distal pancreatic duct dilation.  When compared to CT examination dated 01/21/2019, there has been interval placement of a  duodenal stent as well as choledochojejunostomy with stent placement.  There is moderate pneumobilia throughout both left and right hepatic lobes.    There is a small focus of hypermetabolic soft tissue nodularity along the proximal sigmoid colon best appreciated on axial CT image 205.  This finding may relate to physiologic bowel activity, however per chart review this patient underwent complicated colonoscopy and inflammation of the site of complication may present similarly.  Additionally, neoplasia including 2nd synchronous primary or metastatic disease cannot be entirely excluded.    Index lesions:    *Pancreatic head mass: SUV max 5.78 (axial fused CT image 140).  *Sigmoid colon: SUV max 9.72 (axial fused CT image 205).  Physiologic uptake of the tracer is present within the brain, salivary glands, myocardium, GI and  tracts.    Incidental CT findings: Noncontrast CT demonstrates no acute abnormality.      Impression       Hypermetabolic pancreatic head mass consistent with patient's known pancreatic adenocarcinoma.    Focal, hypermetabolic soft tissue thickening along the proximal sigmoid colon may relate to physiologic bowel activity or inflammation from recent colonoscopy complication, however neoplasia including 2nd synchronous primary or metastatic disease cannot be entirely excluded.     Component      Latest Ref Rng & Units 2/14/2019   WBC      3.90 - 12.70 K/uL 6.52   RBC      4.00 - 5.40 M/uL 3.39 (L)   Hemoglobin      12.0 - 16.0 g/dL 9.9 (L)   Hematocrit      37.0 - 48.5 % 31.1 (L)   MCV      82 - 98 fL 92   MCH      27.0 - 31.0 pg 29.2   MCHC      32.0 - 36.0 g/dL 31.8 (L)   RDW      11.5 - 14.5 % 17.6 (H)   Platelets      150 - 350 K/uL 219   MPV      9.2 - 12.9 fL 11.1   Immature Granulocytes      0.0 - 0.5 % 0.3   Gran # (ANC)      1.8 - 7.7 K/uL 4.8   Immature Grans (Abs)      0.00 - 0.04 K/uL 0.02   Lymph #      1.0 - 4.8 K/uL 0.8 (L)   Mono #      0.3 - 1.0 K/uL 0.4   Eos #      0.0 - 0.5  K/uL 0.5   Baso #      0.00 - 0.20 K/uL 0.07   nRBC      0 /100 WBC 0   Gran%      38.0 - 73.0 % 73.1 (H)   Lymph%      18.0 - 48.0 % 12.3 (L)   Mono%      4.0 - 15.0 % 5.4   Eosinophil%      0.0 - 8.0 % 7.8   Basophil%      0.0 - 1.9 % 1.1   Differential Method       Automated        Component      Latest Ref Rng & Units 2/14/2019   Sodium      136 - 145 mmol/L 142   Potassium      3.5 - 5.1 mmol/L 3.0 (L)   Chloride      95 - 110 mmol/L 101   CO2      23 - 29 mmol/L 32 (H)   Glucose      70 - 110 mg/dL 303 (H)   BUN, Bld      8 - 23 mg/dL 17   Creatinine      0.5 - 1.4 mg/dL 1.3   Calcium      8.7 - 10.5 mg/dL 9.2   Total Protein      6.0 - 8.4 g/dL 6.7   Albumin      3.5 - 5.2 g/dL 2.9 (L)   Total Bilirubin      0.1 - 1.0 mg/dL 4.0 (H)   Alkaline Phosphatase      55 - 135 U/L 174 (H)   AST      10 - 40 U/L 25   ALT      10 - 44 U/L 22   Anion Gap      8 - 16 mmol/L 9   eGFR if African American      >60 mL/min/1.73 m:2 49.4 (A)   eGFR if non African American      >60 mL/min/1.73 m:2 42.9 (A)         Assessment:     1. Adenocarcinoma of head of pancreas  2. Malignant stricture of duodenum  3. Biliary obstruction  4. Jaundice  5. Weight loss  6. Anemia of unknown etiology  7. Elevated CA 19-9  8. Anorexia  9. Nausea  10. Cancer associated pain        Plan:        1,2,3,4: She is s/p biliary and duodenal stents. She is still very jaundiced. Her pancreatic cancer appears to be borderline resectable ( > 180 degree encasement of SMV). No apparent arterial involvement. Case was discussed at upper GI conference this week.   She has been evaluated by surgical oncology. Not a candidate for siddhartha adjuvant chemotherapy at this time due to hyperbilirubinemia.   No evidence of metastatic disease on PET CT on 2/8/19. I discussed siddhartha adjuvant chemotherapy with FOLFIRINOX in detail and explained that it is indicated at this time to debulk the tumor and possibly proceed to surgery eventually,  Risks and benefits explained in detail,  consent obtained.         5.8: Secondary to #1. She was evaluated by nutrition here. She did not want an appetite stilmulant.     6. Hgb 9.9 today     9. On Zofran PRN.      10. On Morphine IR PRN in view of abnormal LFTs

## 2019-02-15 DIAGNOSIS — C25.9 PANCREATIC ADENOCARCINOMA: ICD-10-CM

## 2019-02-15 RX ORDER — MORPHINE SULFATE 15 MG/1
15 TABLET, FILM COATED, EXTENDED RELEASE ORAL 2 TIMES DAILY
Qty: 60 TABLET | Refills: 0 | Status: SHIPPED | OUTPATIENT
Start: 2019-02-15 | End: 2019-03-01 | Stop reason: SDUPTHER

## 2019-02-15 RX ORDER — MORPHINE SULFATE 15 MG/1
15 TABLET, FILM COATED, EXTENDED RELEASE ORAL 2 TIMES DAILY
Qty: 60 TABLET | Refills: 0 | Status: SHIPPED | OUTPATIENT
Start: 2019-02-15 | End: 2019-02-15 | Stop reason: SDUPTHER

## 2019-02-15 RX ORDER — MORPHINE SULFATE 15 MG/1
30 TABLET ORAL EVERY 4 HOURS PRN
Qty: 90 TABLET | Refills: 0 | Status: SHIPPED | OUTPATIENT
Start: 2019-02-15 | End: 2019-03-02 | Stop reason: SDUPTHER

## 2019-02-18 DIAGNOSIS — C25.9 PANCREATIC ADENOCARCINOMA: Primary | ICD-10-CM

## 2019-02-18 NOTE — DISCHARGE SUMMARY
Physician Attestation for Scribe:  I, Bebe Guido MD, personally performed the services described in this documentation. All medical record entries made by the scribe were at my direction and in my presence.  I have reviewed the chart and agree that the record reflects my personal performance and is accurate and complete.   Bebe Guido MD    Discharge Summary  Hospital Medicine    Attending Provider on Discharge: Bebe Guido MD  [facility] Medicine Team: [facility] MED D  Date of Admission:  2/1/2019     Date of Discharge:  2/2/20192/2/2019  Length of Stay:  LOS: 0 days   Code status: Full Code    Active Hospital Problems    Diagnosis  POA    *Severe vomiting following anesthesia [R11.10]  Yes    Duodenal stenosis [K31.5]  Yes    Biliary obstruction [K83.1]  Yes    Essential hypertension [I10]  Yes      Resolved Hospital Problems   No resolved problems to display.        HPI  67 yo female on a background significant for HTN, an biliary obstruction with duodenal stenosis secondary to pancreatic cancer. Patient underwent ERCP with choledochoduodenostomy & duodenal stent placement. She suffered copious episodes of bilious emesis in PACU with some relief with Zofran. She reports some mild nausea a few days prior to the procedure, but no emesis. She denies any abdominal pain, but does report chronic right-sided back pain for about a year, consistent with her pancreatic cancer diagnosis; she takes Percocet 10mg PRN. She had a BM yesterday and takes Miralax daily. She denies any dizziness, dysuria or anorexia.      Hospital Course    Overview  67 yo female on a background significant for HTN, an biliary obstruction with duodenal stenosis secondary to pancreatic cancer. Patient underwent ERCP with choledochoduodenostomy & duodenal stent placement and is admitted for nausea and bilious vomiting, post-anesthesia. He symptoms resolved with anti-emetics, pantoprazole and clear liquid diet as tolerated.      By  "Problem:     Pancreas cancer  Biliary obstruction with duodenal stenosis   Post-anesthesia nausea and vomiting  · Nausea and vomiting following scheduled ERCP: "Succesful palliation of malignant biliary and gastric obstruction with choledochoduodenstomy and duodenal stent placement". Continue 1 week-course of prophylactic ciprofloxacin PO 500mg BID.    · Ordered scheduled Reglan and PRN Zofran. Analgesia: PRN Dilaudid.  · Ordered NS @ 100mL hr, IV PPI x1 and resuming home pantoprazole. Clear liquid as tolerated.   · Work-up negative for pancreatitis, no underlying infectious process and with improving T.bili and LFTs s/p ERCP. N/V resolved; Plan to discharge on PRN Phenergan. (2/2)     Essential Hypertension  · Resumed home anti-hypertensives: irbesartan, brisolol, HCTZ, and hydralazine.      Hypokalemia  · Replete as needed.    Recent Labs   Lab 02/01/19  1716   WBC 8.20   HGB 9.9*   HCT 32.6*              Recent Labs   Lab 02/01/19 1716 02/02/19  0502    138   K 3.5 3.0*    102   CO2 26 26   BUN 19 19   CREATININE 0.9 1.0   * 144*   CALCIUM 9.2 8.9   PHOS  --  3.3   LIPASE 34  --    AMYLASE 24  --            Recent Labs   Lab 02/01/19 1716 02/02/19  0502   ALKPHOS 648* 544*   ALT 92* 75*   * 65*   ALBUMIN 2.1* 2.1*   PROT 6.3 6.0   BILITOT 13.6* 9.8*         Procedures: ERCP with choledochoduodenostomy & duodenal stent placement    Consultants: Gastroenterology.     Discharge Medication List as of 2/2/2019  3:34 PM      START taking these medications    Details   ciprofloxacin HCl (CIPRO) 500 MG tablet Take 1 tablet (500 mg total) by mouth 2 (two) times daily for 7 days, Starting Fri 2/1/2019, Until Sat 2/9/2019, Normal      promethazine (PHENERGAN) 25 MG tablet Take 1 tablet (25 mg total) by mouth every 6 (six) hours as needed for Nausea., Starting Sat 2/2/2019, Normal         CONTINUE these medications which have CHANGED    Details   oxyCODONE-acetaminophen (PERCOCET)  mg " per tablet Take 1 tablet by mouth every 6 (six) hours as needed., Starting Sat 2/2/2019, Until Mon 3/4/2019, Normal         CONTINUE these medications which have NOT CHANGED    Details   bisoprolol-hydrochlorothiazide (ZIAC) 10-6.25 mg per tablet Take 1 tablet by mouth 2 (two) times daily., Starting Mon 12/17/2018, Historical Med      hydrALAZINE (APRESOLINE) 50 MG tablet Take 75 mg by mouth 3 (three) times daily., Starting Mon 11/5/2018, Historical Med      irbesartan (AVAPRO) 150 MG tablet Take 150 mg by mouth every evening., Starting Wed 1/16/2019, Historical Med      LORazepam (ATIVAN) 1 MG tablet Take 1 mg by mouth every 8 (eight) hours as needed for Anxiety., Historical Med      pantoprazole (PROTONIX) 40 MG tablet Take 40 mg by mouth once daily., Starting Wed 1/16/2019, Historical Med      ondansetron (ZOFRAN-ODT) 8 MG TbDL 8 mg every 6 (six) hours as needed., Starting Wed 1/16/2019, Historical Med         STOP taking these medications       amoxicillin (AMOXIL) 250 mg/5 mL suspension Comments:   Reason for Stopping:               Discharge Diet:regular diet as tolerated with Normal Fluid intake of 1500 - 2000 mL per day    Activity: activity as tolerated    Discharge Condition: Stable    Disposition: Home or Self Care    Tests pending at the time of discharge: none      Time spent  on the discharge of the patient including review of hospital course with the patient. reviewing discharge medications and arranging follow-up care     Discharge examination Patient was seen and examined on the date of discharge and determined to be suitable for discharge.    Discharge plan     Future Appointments   Date Time Provider Department Center   2/22/2019 12:30 PM [facility] [id number] [facility] RAD IR [facility]     MD Kavon Chopraibjered Attestation: I personally scribed for No att. providers found on 02/18/2019 at 1:50 PM. Electronically signed by jane Navas on 02/18/2019 at 1:50 PM.

## 2019-02-19 ENCOUNTER — PATIENT MESSAGE (OUTPATIENT)
Dept: HEMATOLOGY/ONCOLOGY | Facility: CLINIC | Age: 69
End: 2019-02-19

## 2019-02-20 DIAGNOSIS — K31.5 DUODENAL STENOSIS: ICD-10-CM

## 2019-02-20 DIAGNOSIS — R63.4 WEIGHT LOSS: ICD-10-CM

## 2019-02-20 DIAGNOSIS — C25.9 PANCREATIC ADENOCARCINOMA: ICD-10-CM

## 2019-02-20 DIAGNOSIS — C25.9 PANCREATIC ADENOCARCINOMA: Primary | ICD-10-CM

## 2019-02-20 RX ORDER — DRONABINOL 5 MG/1
5 CAPSULE ORAL
Qty: 60 CAPSULE | Refills: 2 | Status: SHIPPED | OUTPATIENT
Start: 2019-02-20

## 2019-02-20 RX ORDER — DRONABINOL 5 MG/1
5 CAPSULE ORAL
Qty: 60 CAPSULE | Refills: 2 | Status: SHIPPED | OUTPATIENT
Start: 2019-02-20 | End: 2019-02-20 | Stop reason: SDUPTHER

## 2019-02-21 ENCOUNTER — TELEPHONE (OUTPATIENT)
Dept: INTERVENTIONAL RADIOLOGY/VASCULAR | Facility: HOSPITAL | Age: 69
End: 2019-02-21

## 2019-02-21 ENCOUNTER — TELEPHONE (OUTPATIENT)
Dept: HEMATOLOGY/ONCOLOGY | Facility: CLINIC | Age: 69
End: 2019-02-21

## 2019-02-21 DIAGNOSIS — C25.9 PANCREATIC ADENOCARCINOMA: Primary | ICD-10-CM

## 2019-02-21 RX ORDER — FENTANYL CITRATE 50 UG/ML
50 INJECTION, SOLUTION INTRAMUSCULAR; INTRAVENOUS
Status: CANCELLED | OUTPATIENT
Start: 2019-02-21

## 2019-02-21 RX ORDER — MIDAZOLAM HYDROCHLORIDE 1 MG/ML
1 INJECTION INTRAMUSCULAR; INTRAVENOUS
Status: CANCELLED | OUTPATIENT
Start: 2019-02-21

## 2019-02-21 NOTE — TELEPHONE ENCOUNTER
Spoke to Erika her daughter she is not sure if she is wanting to get the port tomorrow.  He mom is better but is not sure if they will make the appointment.

## 2019-02-22 ENCOUNTER — HOSPITAL ENCOUNTER (OUTPATIENT)
Facility: HOSPITAL | Age: 69
Discharge: HOME | End: 2019-02-22
Attending: INTERNAL MEDICINE | Admitting: INTERNAL MEDICINE
Payer: COMMERCIAL

## 2019-02-22 VITALS
OXYGEN SATURATION: 97 % | TEMPERATURE: 98 F | RESPIRATION RATE: 16 BRPM | HEART RATE: 70 BPM | WEIGHT: 167 LBS | BODY MASS INDEX: 29.59 KG/M2 | DIASTOLIC BLOOD PRESSURE: 71 MMHG | HEIGHT: 63 IN | SYSTOLIC BLOOD PRESSURE: 128 MMHG

## 2019-02-22 DIAGNOSIS — K66.8 BILOMA FOLLOWING SURGERY, SEQUELA: ICD-10-CM

## 2019-02-22 DIAGNOSIS — T81.89XS BILOMA FOLLOWING SURGERY, SEQUELA: ICD-10-CM

## 2019-02-22 DIAGNOSIS — C25.9 PANCREATIC ADENOCARCINOMA: ICD-10-CM

## 2019-02-22 PROBLEM — T81.89XA BILOMA FOLLOWING SURGERY: Status: ACTIVE | Noted: 2019-02-22

## 2019-02-22 PROCEDURE — 25000003 PHARM REV CODE 250: Performed by: INTERNAL MEDICINE

## 2019-02-22 PROCEDURE — 25000003 PHARM REV CODE 250: Performed by: FAMILY MEDICINE

## 2019-02-22 PROCEDURE — 63600175 PHARM REV CODE 636 W HCPCS: Performed by: RADIOLOGY

## 2019-02-22 RX ORDER — MIDAZOLAM HYDROCHLORIDE 1 MG/ML
INJECTION INTRAMUSCULAR; INTRAVENOUS CODE/TRAUMA/SEDATION MEDICATION
Status: COMPLETED | OUTPATIENT
Start: 2019-02-22 | End: 2019-02-22

## 2019-02-22 RX ORDER — FENTANYL CITRATE 50 UG/ML
INJECTION, SOLUTION INTRAMUSCULAR; INTRAVENOUS CODE/TRAUMA/SEDATION MEDICATION
Status: COMPLETED | OUTPATIENT
Start: 2019-02-22 | End: 2019-02-22

## 2019-02-22 RX ORDER — CEFAZOLIN SODIUM 1 G/50ML
SOLUTION INTRAVENOUS
Status: COMPLETED | OUTPATIENT
Start: 2019-02-22 | End: 2019-02-22

## 2019-02-22 RX ORDER — SODIUM CHLORIDE 9 MG/ML
500 INJECTION, SOLUTION INTRAVENOUS ONCE
Status: COMPLETED | OUTPATIENT
Start: 2019-02-22 | End: 2019-02-22

## 2019-02-22 RX ORDER — HEPARIN 100 UNIT/ML
SYRINGE INTRAVENOUS CODE/TRAUMA/SEDATION MEDICATION
Status: COMPLETED | OUTPATIENT
Start: 2019-02-22 | End: 2019-02-22

## 2019-02-22 RX ORDER — LIDOCAINE HYDROCHLORIDE 10 MG/ML
1 INJECTION, SOLUTION EPIDURAL; INFILTRATION; INTRACAUDAL; PERINEURAL ONCE
Status: COMPLETED | OUTPATIENT
Start: 2019-02-22 | End: 2019-02-22

## 2019-02-22 RX ORDER — CEFAZOLIN SODIUM 1 G/3ML
1 INJECTION, POWDER, FOR SOLUTION INTRAMUSCULAR; INTRAVENOUS
Status: DISCONTINUED | OUTPATIENT
Start: 2019-02-22 | End: 2019-02-22 | Stop reason: HOSPADM

## 2019-02-22 RX ADMIN — CEFAZOLIN SODIUM 1 G: 1 SOLUTION INTRAVENOUS at 03:02

## 2019-02-22 RX ADMIN — HEPARIN SODIUM (PORCINE) LOCK FLUSH IV SOLN 100 UNIT/ML 5 ML: 100 SOLUTION at 03:02

## 2019-02-22 RX ADMIN — MIDAZOLAM HYDROCHLORIDE 1 MG: 1 INJECTION, SOLUTION INTRAMUSCULAR; INTRAVENOUS at 03:02

## 2019-02-22 RX ADMIN — LIDOCAINE HYDROCHLORIDE 0.5 MG: 10 INJECTION, SOLUTION EPIDURAL; INFILTRATION; INTRACAUDAL; PERINEURAL at 01:02

## 2019-02-22 RX ADMIN — FENTANYL CITRATE 50 MCG: 50 INJECTION, SOLUTION INTRAMUSCULAR; INTRAVENOUS at 03:02

## 2019-02-22 RX ADMIN — FENTANYL CITRATE 50 MCG: 50 INJECTION, SOLUTION INTRAMUSCULAR; INTRAVENOUS at 02:02

## 2019-02-22 RX ADMIN — MIDAZOLAM HYDROCHLORIDE 1 MG: 1 INJECTION, SOLUTION INTRAMUSCULAR; INTRAVENOUS at 02:02

## 2019-02-22 RX ADMIN — SODIUM CHLORIDE 500 ML: 0.9 INJECTION, SOLUTION INTRAVENOUS at 01:02

## 2019-02-22 NOTE — PROGRESS NOTES
Pt arrived [place] [facility] S/P port placement. Incision site [facility] CDI. Report received from HALLIE Yoon. Family contacted.

## 2019-02-22 NOTE — H&P
Radiology History & Physical      SUBJECTIVE:     Chief Complaint: preprocedure    History of Present Illness:  Roopa Hanley is a 68 y.o. female with pancreatic cancer who presents for port placement.     Past Medical History:   Diagnosis Date    Biliary obstruction     Duodenal stenosis     Hypertension     Jaundice      Past Surgical History:   Procedure Laterality Date    EGD (ESOPHAGOGASTRODUODENOSCOPY) N/A 2/1/2019    Performed by Celestino Christianson MD at [facility] ENDO (2ND FLR)    EGD (ESOPHAGOGASTRODUODENOSCOPY) N/A 1/25/2019    Performed by Rashawn Martinez MD at [facility] END[facility] (2ND FLR)    ERCP (ENDOSCOPIC RETROGRADE CHOLANGIOPANCREATOGRAPHY) N/A 1/25/2019    Performed by Rashawn Martinez MD at [facility] END[facility] (2ND FLR)    FOOT SURGERY Right 2012    HYSTERECTOMY      partial    ULTRASOUND, UPPER GI TRACT, ENDOSCOPIC N/A 2/1/2019    Performed by Celestino Christianson MD at [facility] ENDO (2ND FLR)    ULTRASOUND, UPPER GI TRACT, ENDOSCOPIC N/A 1/25/2019    Performed by Rashawn Martinez MD at [facility] ENDO (2ND FLR)       Home Meds:   Prior to Admission medications    Medication Sig Start Date End Date Taking? Authorizing Provider   bisoprolol-hydrochlorothiazide (ZIAC) 10-6.25 mg per tablet Take 1 tablet by mouth 2 (two) times daily. 12/17/18  Yes Historical Provider, MD   dronabinol (MARINOL) 5 MG capsule Take 1 capsule (5 mg total) by mouth 2 (two) times daily before meals. 2/20/19  Yes Rigoberto Mcarthur MD   hydrALAZINE (APRESOLINE) 50 MG tablet Take 75 mg by mouth 3 (three) times daily. 11/5/18  Yes Historical Provider, MD   irbesartan (AVAPRO) 150 MG tablet Take 150 mg by mouth every evening. 1/16/19  Yes Historical Provider, MD   LORazepam (ATIVAN) 1 MG tablet Take 1 mg by mouth every 8 (eight) hours as needed for Anxiety.   Yes Historical Provider, MD   morphine (MS CONTIN) 15 MG 12 hr tablet Take 1 tablet (15 mg total) by mouth 2 (two) times daily. 2/15/19  Yes Kaylee Flanagan MD   ondansetron (ZOFRAN) 8 MG tablet Take 1 tablet  (8 mg total) by mouth every 8 (eight) hours as needed for Nausea. 2/4/19 2/4/20 Yes Rigoberto Mcarthur MD   pantoprazole (PROTONIX) 40 MG tablet Take 40 mg by mouth once daily. 1/16/19  Yes Kieran Richardson MD   promethazine (PHENERGAN) 25 MG tablet Take 1 tablet (25 mg total) by mouth every 6 (six) hours as needed for Nausea. 2/2/19  Yes Bebe Guido MD   morphine (MSIR) 15 MG tablet Take 2 tablets (30 mg total) by mouth every 4 (four) hours as needed for Pain. 2/15/19   Rigoberto Mcarthur MD   oxyCODONE-acetaminophen (PERCOCET)  mg per tablet Take 1 tablet by mouth every 6 (six) hours as needed. 2/2/19 3/4/19  Bebe Guido MD     Anticoagulants/Antiplatelets: no anticoagulation    Allergies: Review of patient's allergies indicates:  No Known Allergies  Sedation History:  no adverse reactions    Review of Systems:   Hematological: no known coagulopathies  Respiratory: no shortness of breath  Cardiovascular: no chest pain  Gastrointestinal: no abdominal pain  Genito-Urinary: no dysuria  Musculoskeletal: negative  Neurological: no TIA or stroke symptoms         OBJECTIVE:     Vital Signs (Most Recent)  Temp: 98.1 °F (36.7 °C) (02/22/19 1321)  Pulse: 84 (02/22/19 1321)  Resp: 18 (02/22/19 1321)  BP: 130/62 (02/22/19 1322)  SpO2: 100 % (02/22/19 1321)    Physical Exam:  ASA: 3  Mallampati: 3  General: no acute distress  Mental Status: alert and oriented to person, place and time  HEENT: normocephalic, atraumatic  Chest: unlabored breathing  Heart: regular heart rate  Abdomen: nondistended  Extremity: moves all extremities    Laboratory  Lab Results   Component Value Date    INR 1.2 02/22/2019       Lab Results   Component Value Date    WBC 6.52 02/14/2019    HGB 9.9 (L) 02/14/2019    HCT 31.1 (L) 02/14/2019    MCV 92 02/14/2019     02/14/2019      Lab Results   Component Value Date     (H) 02/14/2019     02/14/2019    K 3.0 (L) 02/14/2019     02/14/2019    CO2 32 (H) 02/14/2019    BUN 17  02/14/2019    CREATININE 1.3 02/14/2019    CALCIUM 9.2 02/14/2019    ALT 22 02/14/2019    AST 25 02/14/2019    ALBUMIN 2.9 (L) 02/14/2019    BILITOT 4.0 (H) 02/14/2019    BILIDIR 4.2 (H) 02/08/2019       ASSESSMENT/PLAN:     Sedation Plan: Moderate sedation  Patient will undergo port placement.    Rudy Foote MD  Interventional Radiology PGY-II  [facility]  Pager: [phone]

## 2019-02-22 NOTE — DISCHARGE SUMMARY
Radiology Discharge Summary      Hospital Course: No complications    Admit Date: 2/22/2019  Discharge Date: 02/22/2019     Instructions Given to Patient: Yes  Diet: Resume prior diet  Activity: activity as tolerated and no driving for today    Description of Condition on Discharge: Stable  Vital Signs (Most Recent): Temp: 97.8 °F (36.6 °C) (02/22/19 1530)  Pulse: 70 (02/22/19 1627)  Resp: 16 (02/22/19 1627)  BP: 128/71 (02/22/19 1627)  SpO2: 97 % (02/22/19 1627)    Discharge Disposition: Home    Discharge Diagnosis: Pancreatic cancer s/p port placement. Follow up as scheduled.    Kalin Rangel M.D.  Diagnostic and Interventional Radiologist  Department of Radiology  Pager: [phone]

## 2019-02-22 NOTE — PROGRESS NOTES
Pt and family members acknowledged understanding of discharge instructions. Pt discharged per unit and hospital protocol via wheelchair with HALLIE Ferreira.

## 2019-02-22 NOTE — PROCEDURES
Radiology Post-Procedure Note    Pre Op Diagnosis: Pancreatic cancer    Post Op Diagnosis: Same    Procedure: PORT placement    Procedure performed by: Kalin Rangel MD    Written Informed Consent Obtained: Yes    Specimen Removed: No    Estimated Blood Loss: Minimal    Findings:   Using realtime U/S guidance an 8 Fr port catheter was placed into the right IJ vein with tip of the catheter in the RA.    Port is ready for use.     Kalin Rangel M.D.  Diagnostic and Interventional Radiologist  Department of Radiology  Pager: [phone]

## 2019-02-25 ENCOUNTER — PATIENT MESSAGE (OUTPATIENT)
Dept: HEMATOLOGY/ONCOLOGY | Facility: CLINIC | Age: 69
End: 2019-02-25

## 2019-02-25 NOTE — TELEPHONE ENCOUNTER
Chemo approved.    Please schedule her for cbc/cmp, appt with dr duarte, folfirinox. D/c pump on day 3.    ~charles

## 2019-02-26 ENCOUNTER — OFFICE VISIT (OUTPATIENT)
Dept: HEMATOLOGY/ONCOLOGY | Facility: CLINIC | Age: 69
End: 2019-02-26
Payer: COMMERCIAL

## 2019-02-26 ENCOUNTER — TELEPHONE (OUTPATIENT)
Dept: HEMATOLOGY/ONCOLOGY | Facility: CLINIC | Age: 69
End: 2019-02-26

## 2019-02-26 ENCOUNTER — INFUSION (OUTPATIENT)
Dept: INFUSION THERAPY | Facility: HOSPITAL | Age: 69
End: 2019-02-26
Attending: INTERNAL MEDICINE
Payer: COMMERCIAL

## 2019-02-26 VITALS
TEMPERATURE: 99 F | RESPIRATION RATE: 18 BRPM | HEART RATE: 86 BPM | WEIGHT: 160.69 LBS | HEIGHT: 63 IN | BODY MASS INDEX: 28.47 KG/M2 | SYSTOLIC BLOOD PRESSURE: 138 MMHG | OXYGEN SATURATION: 98 % | DIASTOLIC BLOOD PRESSURE: 76 MMHG

## 2019-02-26 VITALS
TEMPERATURE: 99 F | DIASTOLIC BLOOD PRESSURE: 58 MMHG | HEART RATE: 89 BPM | SYSTOLIC BLOOD PRESSURE: 121 MMHG | RESPIRATION RATE: 18 BRPM

## 2019-02-26 DIAGNOSIS — R63.4 WEIGHT LOSS: ICD-10-CM

## 2019-02-26 DIAGNOSIS — K83.1 BILIARY DISEASE WITH OBSTRUCTION: ICD-10-CM

## 2019-02-26 DIAGNOSIS — G89.3 CANCER ASSOCIATED PAIN: ICD-10-CM

## 2019-02-26 DIAGNOSIS — C25.9 PANCREATIC ADENOCARCINOMA: Primary | ICD-10-CM

## 2019-02-26 DIAGNOSIS — R63.0 ANOREXIA: ICD-10-CM

## 2019-02-26 DIAGNOSIS — D63.0 ANEMIA IN NEOPLASTIC DISEASE: ICD-10-CM

## 2019-02-26 DIAGNOSIS — R73.9 HYPERGLYCEMIA: ICD-10-CM

## 2019-02-26 DIAGNOSIS — I10 ESSENTIAL HYPERTENSION: ICD-10-CM

## 2019-02-26 LAB
ALBUMIN SERPL BCP-MCNC: 2.8 G/DL
ALP SERPL-CCNC: 115 U/L
ALT SERPL W/O P-5'-P-CCNC: 19 U/L
ANION GAP SERPL CALC-SCNC: 10 MMOL/L
AST SERPL-CCNC: 31 U/L
BASOPHILS # BLD AUTO: 0.05 K/UL
BASOPHILS NFR BLD: 0.7 %
BILIRUB SERPL-MCNC: 2.4 MG/DL
BUN SERPL-MCNC: 16 MG/DL
CALCIUM SERPL-MCNC: 8.8 MG/DL
CHLORIDE SERPL-SCNC: 99 MMOL/L
CO2 SERPL-SCNC: 31 MMOL/L
CREAT SERPL-MCNC: 0.9 MG/DL
DIFFERENTIAL METHOD: ABNORMAL
EOSINOPHIL # BLD AUTO: 0.3 K/UL
EOSINOPHIL NFR BLD: 5 %
ERYTHROCYTE [DISTWIDTH] IN BLOOD BY AUTOMATED COUNT: 14.7 %
EST. GFR  (AFRICAN AMERICAN): >60 ML/MIN/1.73 M^2
EST. GFR  (NON AFRICAN AMERICAN): >60 ML/MIN/1.73 M^2
GLUCOSE SERPL-MCNC: 224 MG/DL
HCT VFR BLD AUTO: 31.4 %
HGB BLD-MCNC: 10.5 G/DL
IMM GRANULOCYTES # BLD AUTO: 0.04 K/UL
IMM GRANULOCYTES NFR BLD AUTO: 0.6 %
LYMPHOCYTES # BLD AUTO: 0.6 K/UL
LYMPHOCYTES NFR BLD: 8.5 %
MAGNESIUM SERPL-MCNC: 1.3 MG/DL
MCH RBC QN AUTO: 28.8 PG
MCHC RBC AUTO-ENTMCNC: 33.4 G/DL
MCV RBC AUTO: 86 FL
MONOCYTES # BLD AUTO: 0.4 K/UL
MONOCYTES NFR BLD: 6.5 %
NEUTROPHILS # BLD AUTO: 5.4 K/UL
NEUTROPHILS NFR BLD: 78.7 %
NRBC BLD-RTO: 0 /100 WBC
PLATELET # BLD AUTO: 157 K/UL
PMV BLD AUTO: 11.6 FL
POTASSIUM SERPL-SCNC: 2.3 MMOL/L
PROT SERPL-MCNC: 6.1 G/DL
RBC # BLD AUTO: 3.65 M/UL
SODIUM SERPL-SCNC: 140 MMOL/L
WBC # BLD AUTO: 6.82 K/UL

## 2019-02-26 PROCEDURE — 63600175 PHARM REV CODE 636 W HCPCS: Performed by: INTERNAL MEDICINE

## 2019-02-26 PROCEDURE — 3078F PR MOST RECENT DIASTOLIC BLOOD PRESSURE < 80 MM HG: ICD-10-PCS | Mod: CPTII,S$GLB,, | Performed by: INTERNAL MEDICINE

## 2019-02-26 PROCEDURE — 25000003 PHARM REV CODE 250: Performed by: INTERNAL MEDICINE

## 2019-02-26 PROCEDURE — 99999 PR PBB SHADOW E&M-EST. PATIENT-LVL IV: CPT | Mod: PBBFAC,,, | Performed by: INTERNAL MEDICINE

## 2019-02-26 PROCEDURE — 96417 CHEMO IV INFUS EACH ADDL SEQ: CPT

## 2019-02-26 PROCEDURE — 99214 OFFICE O/P EST MOD 30 MIN: CPT | Mod: S$GLB,,, | Performed by: INTERNAL MEDICINE

## 2019-02-26 PROCEDURE — 96367 TX/PROPH/DG ADDL SEQ IV INF: CPT

## 2019-02-26 PROCEDURE — 96416 CHEMO PROLONG INFUSE W/PUMP: CPT

## 2019-02-26 PROCEDURE — 1101F PT FALLS ASSESS-DOCD LE1/YR: CPT | Mod: CPTII,S$GLB,, | Performed by: INTERNAL MEDICINE

## 2019-02-26 PROCEDURE — 3078F DIAST BP <80 MM HG: CPT | Mod: CPTII,S$GLB,, | Performed by: INTERNAL MEDICINE

## 2019-02-26 PROCEDURE — 96375 TX/PRO/DX INJ NEW DRUG ADDON: CPT

## 2019-02-26 PROCEDURE — 3075F SYST BP GE 130 - 139MM HG: CPT | Mod: CPTII,S$GLB,, | Performed by: INTERNAL MEDICINE

## 2019-02-26 PROCEDURE — 99999 PR PBB SHADOW E&M-EST. PATIENT-LVL IV: ICD-10-PCS | Mod: PBBFAC,,, | Performed by: INTERNAL MEDICINE

## 2019-02-26 PROCEDURE — 80053 COMPREHEN METABOLIC PANEL: CPT

## 2019-02-26 PROCEDURE — 83735 ASSAY OF MAGNESIUM: CPT

## 2019-02-26 PROCEDURE — 99214 PR OFFICE/OUTPT VISIT, EST, LEVL IV, 30-39 MIN: ICD-10-PCS | Mod: S$GLB,,, | Performed by: INTERNAL MEDICINE

## 2019-02-26 PROCEDURE — 1101F PR PT FALLS ASSESS DOC 0-1 FALLS W/OUT INJ PAST YR: ICD-10-PCS | Mod: CPTII,S$GLB,, | Performed by: INTERNAL MEDICINE

## 2019-02-26 PROCEDURE — 96413 CHEMO IV INFUSION 1 HR: CPT

## 2019-02-26 PROCEDURE — 3075F PR MOST RECENT SYSTOLIC BLOOD PRESS GE 130-139MM HG: ICD-10-PCS | Mod: CPTII,S$GLB,, | Performed by: INTERNAL MEDICINE

## 2019-02-26 PROCEDURE — 96415 CHEMO IV INFUSION ADDL HR: CPT

## 2019-02-26 PROCEDURE — A4216 STERILE WATER/SALINE, 10 ML: HCPCS | Performed by: INTERNAL MEDICINE

## 2019-02-26 PROCEDURE — 85025 COMPLETE CBC W/AUTO DIFF WBC: CPT

## 2019-02-26 RX ORDER — ATROPINE SULFATE 0.4 MG/ML
0.4 INJECTION, SOLUTION ENDOTRACHEAL; INTRAMEDULLARY; INTRAMUSCULAR; INTRAVENOUS; SUBCUTANEOUS ONCE AS NEEDED
Status: COMPLETED | OUTPATIENT
Start: 2019-02-26 | End: 2019-02-26

## 2019-02-26 RX ORDER — POTASSIUM CHLORIDE 20 MEQ/15ML
40 SOLUTION ORAL DAILY
Qty: 473 ML | Refills: 2 | Status: SHIPPED | OUTPATIENT
Start: 2019-02-26 | End: 2019-03-04

## 2019-02-26 RX ORDER — HEPARIN 100 UNIT/ML
500 SYRINGE INTRAVENOUS
Status: CANCELLED | OUTPATIENT
Start: 2019-02-26

## 2019-02-26 RX ORDER — SODIUM CHLORIDE 0.9 % (FLUSH) 0.9 %
10 SYRINGE (ML) INJECTION
Status: COMPLETED | OUTPATIENT
Start: 2019-02-26 | End: 2019-02-26

## 2019-02-26 RX ORDER — HEPARIN 100 UNIT/ML
500 SYRINGE INTRAVENOUS
Status: COMPLETED | OUTPATIENT
Start: 2019-02-26 | End: 2019-02-26

## 2019-02-26 RX ORDER — EPINEPHRINE 0.3 MG/.3ML
0.3 INJECTION SUBCUTANEOUS ONCE AS NEEDED
Status: DISCONTINUED | OUTPATIENT
Start: 2019-02-26 | End: 2019-02-26 | Stop reason: HOSPADM

## 2019-02-26 RX ORDER — POTASSIUM CHLORIDE 7.45 MG/ML
10 INJECTION INTRAVENOUS
Status: COMPLETED | OUTPATIENT
Start: 2019-02-26 | End: 2019-02-26

## 2019-02-26 RX ORDER — POTASSIUM CHLORIDE 7.45 MG/ML
20 INJECTION INTRAVENOUS
Status: CANCELLED | OUTPATIENT
Start: 2019-02-26 | End: 2019-02-26

## 2019-02-26 RX ORDER — SODIUM CHLORIDE 0.9 % (FLUSH) 0.9 %
10 SYRINGE (ML) INJECTION
Status: CANCELLED | OUTPATIENT
Start: 2019-02-26

## 2019-02-26 RX ORDER — POTASSIUM CHLORIDE 750 MG/1
40 TABLET, EXTENDED RELEASE ORAL
Status: DISCONTINUED | OUTPATIENT
Start: 2019-02-26 | End: 2019-05-22 | Stop reason: HOSPADM

## 2019-02-26 RX ORDER — DIPHENHYDRAMINE HYDROCHLORIDE 50 MG/ML
50 INJECTION INTRAMUSCULAR; INTRAVENOUS ONCE AS NEEDED
Status: DISCONTINUED | OUTPATIENT
Start: 2019-02-26 | End: 2019-02-26 | Stop reason: HOSPADM

## 2019-02-26 RX ADMIN — FLUOROURACIL 4440 MG: 50 INJECTION, SOLUTION INTRAVENOUS at 04:02

## 2019-02-26 RX ADMIN — SODIUM CHLORIDE 232 MG: 0.9 INJECTION, SOLUTION INTRAVENOUS at 03:02

## 2019-02-26 RX ADMIN — LEUCOVORIN CALCIUM 740 MG: 350 INJECTION, POWDER, LYOPHILIZED, FOR SOLUTION INTRAMUSCULAR; INTRAVENOUS at 03:02

## 2019-02-26 RX ADMIN — DEXAMETHASONE SODIUM PHOSPHATE: 4 INJECTION, SOLUTION INTRAMUSCULAR; INTRAVENOUS at 12:02

## 2019-02-26 RX ADMIN — POTASSIUM CHLORIDE 10 MEQ: 7.46 INJECTION, SOLUTION INTRAVENOUS at 10:02

## 2019-02-26 RX ADMIN — POTASSIUM CHLORIDE 10 MEQ: 7.46 INJECTION, SOLUTION INTRAVENOUS at 11:02

## 2019-02-26 RX ADMIN — OXALIPLATIN 157 MG: 5 INJECTION, SOLUTION INTRAVENOUS at 01:02

## 2019-02-26 RX ADMIN — Medication 10 ML: at 08:02

## 2019-02-26 RX ADMIN — ATROPINE SULFATE 0.4 MG: 0.4 INJECTION, SOLUTION INTRAMUSCULAR; INTRAVENOUS; SUBCUTANEOUS at 03:02

## 2019-02-26 RX ADMIN — HEPARIN SODIUM (PORCINE) LOCK FLUSH IV SOLN 100 UNIT/ML 500 UNITS: 100 SOLUTION at 08:02

## 2019-02-26 NOTE — PLAN OF CARE
Problem: Nausea and Vomiting (Chemotherapy Effects)  Goal: Fluid and Electrolyte Balance  Outcome: Ongoing (interventions implemented as appropriate)  Patient here for C1D1 of FOLFIRNOX.  Assessment completed and labs reviewed.  VSS.  Side effects reviewed with patient, verbalized understanding, answered questions as needed.  No needs at this time.  Chair reclined and blanket offered.  Will continue to monitor.

## 2019-02-26 NOTE — NURSING
Patient here for blood draw from right chest port-accesses easily with good blood return-blood to lab-line flushed-port left accessed for chemo today-patient tolerated well.

## 2019-02-26 NOTE — PROGRESS NOTES
CC: Pancreatic adenocarcinoma, follow up      HPI:  is a 67y/o lady with recently diagnosed pancreatic cancer. She has hypertension.  She has history of flank/ back pain for over a year. She describes this as constant, achy, progressively worsening . This had impaired her ADLs and interrupted her sleep. She has recent weight loss--15lb since Dec 2018. She has decreased appetite. She had also noticed change in stool to light color. She had colonoscopy on 1/8/2019, suffered complications. She was told she had perforated an ulcer. She was admitted to [facility]  for 9 days. CT scan done with oral contrast via NG tube r/t poor renal function. She was discharged 1/16/19.   She was told she had a pancreatic mass. She was evaluated here by . She continues to have jaundice, and back pain.   She is still not eating well.      Interval history:  She is here for a follow up. She is still not eating well. She continues to have nausea, abdominal pain. She has aport placed.        Review of Systems   Constitutional: Positive for malaise/fatigue and weight loss. Negative for chills and fever.   HENT: Negative for congestion, ear discharge and nosebleeds.    Eyes: Negative for blurred vision, double vision and photophobia.   Respiratory: Negative for cough, hemoptysis, sputum production, shortness of breath and wheezing.    Cardiovascular: Negative for chest pain, palpitations and orthopnea.   Gastrointestinal: Positive for nausea. Negative for abdominal pain, constipation, diarrhea, heartburn and vomiting.   Genitourinary: Negative for dysuria, frequency and urgency.   Musculoskeletal: Positive for back pain. Negative for myalgias and neck pain.   Skin: Negative for rash.   Neurological: Positive for weakness. Negative for dizziness, tingling, tremors, sensory change and headaches.       Current Outpatient Medications   Medication Sig    bisoprolol-hydrochlorothiazide (ZIAC) 10-6.25 mg per tablet Take 1 tablet by  mouth 2 (two) times daily.    dronabinol (MARINOL) 5 MG capsule Take 1 capsule (5 mg total) by mouth 2 (two) times daily before meals.    hydrALAZINE (APRESOLINE) 50 MG tablet Take 75 mg by mouth 3 (three) times daily.    irbesartan (AVAPRO) 150 MG tablet Take 150 mg by mouth every evening.    LORazepam (ATIVAN) 1 MG tablet Take 1 mg by mouth every 8 (eight) hours as needed for Anxiety.    morphine (MS CONTIN) 15 MG 12 hr tablet Take 1 tablet (15 mg total) by mouth 2 (two) times daily.    morphine (MSIR) 15 MG tablet Take 2 tablets (30 mg total) by mouth every 4 (four) hours as needed for Pain.    ondansetron (ZOFRAN) 8 MG tablet Take 1 tablet (8 mg total) by mouth every 8 (eight) hours as needed for Nausea.    oxyCODONE-acetaminophen (PERCOCET)  mg per tablet Take 1 tablet by mouth every 6 (six) hours as needed.    pantoprazole (PROTONIX) 40 MG tablet Take 40 mg by mouth once daily.    promethazine (PHENERGAN) 25 MG tablet Take 1 tablet (25 mg total) by mouth every 6 (six) hours as needed for Nausea.     No current facility-administered medications for this visit.          Vitals:    02/26/19 0901   BP: 138/76   Pulse: 86   Resp: 18   Temp: 98.6 °F (37 °C)          Physical Exam   Constitutional: She is oriented to person, place, and time. She appears well-developed.   HENT:   Head: Normocephalic and atraumatic.   Mouth/Throat: No oropharyngeal exudate.   Eyes: Pupils are equal, round, and reactive to light. No scleral icterus.   Neck: Normal range of motion.   Cardiovascular: Normal rate and regular rhythm.   No murmur heard.  Pulmonary/Chest: Effort normal and breath sounds normal. No respiratory distress. She has no wheezes. She has no rales.   Abdominal: Soft. Bowel sounds are normal. She exhibits no distension. There is tenderness. There is no rebound and no guarding.   Musculoskeletal: She exhibits no edema.   Lymphadenopathy:     She has no cervical adenopathy.   Neurological: She is alert  and oriented to person, place, and time.   Skin: Skin is warm.   Psychiatric: She has a normal mood and affect.     1/21/19 CT chest, abdomen and pelvis with cont             FINDINGS:  The soft tissues at the base of the neck are unremarkable.  The thyroid gland is normal in size and configuration.  Hypodensity in the right lateral aspect of the thyroid isthmus measures 0.7 cm.  There is no abnormal lymph node enlargement.    There is no axillary, mediastinal, or hilar lymphadenopathy.  The hilar contours are unremarkable.  The esophagus is normal in course and contour.    The thoracic aorta is normal in course, caliber, and contour without significant atherosclerotic calcifications.The heart does not appear enlarged and there is no pericardial effusion.    The trachea and proximal airways are patent.  The lungs are symmetrically expanded and demonstrate no convincing evidence of consolidation, pleural thickening, pneumothorax, or pleural fluid.  Right hemidiaphragmatic elevation.    The liver is prominent in size measuring 18.1 cm in craniocaudal dimension and slightly decreased in attenuation reflecting hepatic steatosis.  There are several hypodensities in the right hepatic lobe at the periphery, largest measuring 1.4 cm, which are favored to represent cysts.  The gallbladder is distended without evidence of calcified gallstones, or wall thickening, or character pericholecystic fluid.  There is hepatic subcapsular fluid collection posterior to the gallbladder which measures approximately 3.3 x 1.2 cm.  There is marked intra and extrahepatic biliary ductal dilatation with the common bile duct measuring up to 1.6 cm in transverse dimension.    There is an ill-defined hypodense solid lesion in the head of the pancreas measuring 4.3 x 4.5 x 4.4 cm (AP x TV x CC; axial series 3, image 114 and coronal series 601, image 52) with associated pancreatic ductal dilatation with the pancreatic duct measuring up to 1.1 cm.   Assessment of the portal vein, splenic vein, SMV demonstrates pancreatic mass surrounding the superior mesenteric vein (greater than 180 degrees), also vessel contour producing concentric narrowing of the lumen consistent with vascular encasement.  The IVC and celiac and superior mesenteric arteries are not involved.    The stomach, spleen, pancreas, and adrenal glands are unremarkable.    The kidneys are normal in size and location.  Bilateral renal hypodensities too small to fully characterize, though may represent cysts.  There is no evidence of hydronephrosis.  The ureters appear normal in course and caliber without evidence of ureteral dilatation. The urinary bladder demonstrates no significant abnormality. Operative change of prior hysterectomy with small approximately 2 cm cystic lesion in the left adnexa likely an ovarian cyst.    The abdominal aorta is normal in course and caliber without significant atherosclerotic calcifications.    The visualized loops of small bowel show no evidence of obstruction or inflammation. Large bowel is unremarkable.  High density material in the appendix, likely an appendicolith.  There is no intraperitoneal free air.  Mild pelvic ascites.  There is no evidence of lymph node enlargement in the abdomen or pelvis.    When viewed with bone windows the osseous structures are unremarkable.  Fat containing umbilical hernia.       Impression         Ill-defined hypodense solid lesion of the head the pancreas concerning for pancreatic adenocarcinoma with associated intrahepatic and extrahepatic biliary ductal dilatation, pancreatic ductal dilatation and associated encasement of the superior mesenteric vein as detailed above.  There are multiple normal sized regional lymph nodes.    Left adnexal hypodensity, likely a cyst.  This can be followed with routine oncologic surveillance.    Additional findings of hypodense right thyroid nodule, hepatomegaly with hepatic steatosis, right  hepatic cysts, bilateral renal cysts, and umbilical hernia.    RECIST SUMMARY:    Date of prior examination for comparison: Baseline study    Lesion 1: Pancreatic head mass.  4.5 cm. Series 2 image 138.  New lesion.                  1/25/19 EGD/EUS           ENDOSCOPIC FINDING: :       There is no endoscopic evidence of bezoar/food (residue) or fluid collections in the entire examined stomach (stomach appears to be  emptying).       An acquired extrinsic severe stenosis was found in the first portion of the duodenum and was traversed (with the EGD scope, but could not  be traversed with the EUS scope).       ENDOSONOGRAPHIC FINDING: :       The esophagus, stomach and duodenum and adjacent structures were visualized endosonographically. Endosonographic imaging in the visualized portion of the liver showed no lesion.       A mass was identified in the pancreatic head. The mass was  hypoechoic. The mass measured 41 mm by 35 mm in maximal  cross-sectional diameter. The outer margins were irregular. There  was sonographic evidence suggesting invasion into the superior        mesenteric vein (manifested by encasement) and peripancreatic fat. The remainder of the pancreas was examined. The endosonographic appearance of parenchyma and the upstream pancreatic duct indicated        duct dilation, a tortuous/ectatic duct and parenchymal atrophy.    Impression:           - Acquired duodenal stenosis.                        - A mass was identified in the pancreatic head. This was staged T3 Nx Mx by endosonographic  criteria. The staging applies if malignancy is confirmed.                        - No specimens collected.  Recommendation:       - Discharge patient to home (ambulatory).                        - Resume previous diet; Discharge to home                         (ambulatory); Resume outpatient medications        2/1/19 ERCP        Findings:      ENDOSCOPIC  FINDING:The examined esophagus was endoscopically normal. The  entire examined stomach was endoscopically normal. The examined duodenum was endoscopically normal. A small amount of food (residue) was  found in the gastric antrum. An acquired malignant-appearing, intrinsic severe stenosis was found in the first portion of the duodenum and was non-traversed.      ENDOSONOGRAPHIC FINDING:The esophagus stomach and duodenum and adjacent structures were visualized endosonographically. A mass was identified in the pancreatic head. There was dilation in the common hepatic duct which measured up to 15 mm. The decision was made to make a bile duct  puncture into the common bile duct from the duodenal bulb. Under EUS guidance a 19guage needle was used to puncture into the  Common hepatic duct. 20cc's of dark bile was aspirated. A cholangiogram was performed through the 19 g needle,       A guidewire was advanced through the EUS needle and into the intrahepatic biliary tree. A cautery-tipped, lumen-apposing stent delivery system was advanced across the transduodenal puncture site and into the biliary system using cautery. A 10x 10mm lumen-apposing stent ([employer]) was placed into the biliary tree, with one end in the extrahepatic duct and the other in the duodenal. Good drainage of bile and contrast was noted post stent placement. The wire remained in place and a 5fr 5cm double pigtail stent was placed across the LAMSthe stent was seen to pigtail int he common hepatic duct and in the duodenal bulb.       After succesful biliary drainage, the duodenal stricture was stented  with a 22 mm x 12 cm WallFlex stent under fluoroscopic guidance.    Impression:           Succesful palliation of malignant biliary and gastric obstruction with choledochoduodenstomy and duodenal stent placement           2/8/19 PET CT          COMPARISON:  CT chest abdomen pelvis with contrast 01/21/2019    FINDINGS:  Quality of the study: Adequate.    There is redemonstration of an ill-defined pancreatic head mass which  demonstrates hypermetabolic activity (SUV max 5.78).  This mass measures at least 4.7 x 4.3 cm with distal pancreatic duct dilation.  When compared to CT examination dated 01/21/2019, there has been interval placement of a duodenal stent as well as choledochojejunostomy with stent placement.  There is moderate pneumobilia throughout both left and right hepatic lobes.    There is a small focus of hypermetabolic soft tissue nodularity along the proximal sigmoid colon best appreciated on axial CT image 205.  This finding may relate to physiologic bowel activity, however per chart review this patient underwent complicated colonoscopy and inflammation of the site of complication may present similarly.  Additionally, neoplasia including 2nd synchronous primary or metastatic disease cannot be entirely excluded.    Index lesions:    *Pancreatic head mass: SUV max 5.78 (axial fused CT image 140).  *Sigmoid colon: SUV max 9.72 (axial fused CT image 205).  Physiologic uptake of the tracer is present within the brain, salivary glands, myocardium, GI and  tracts.    Incidental CT findings: Noncontrast CT demonstrates no acute abnormality.       Impression         Hypermetabolic pancreatic head mass consistent with patient's known pancreatic adenocarcinoma.    Focal, hypermetabolic soft tissue thickening along the proximal sigmoid colon may relate to physiologic bowel activity or inflammation from recent colonoscopy complication, however neoplasia including 2nd synchronous primary or metastatic disease cannot be entirely excluded.        Component      Latest Ref Rng & Units 2/26/2019   WBC      3.90 - 12.70 K/uL 6.82   RBC      4.00 - 5.40 M/uL 3.65 (L)   Hemoglobin      12.0 - 16.0 g/dL 10.5 (L)   Hematocrit      37.0 - 48.5 % 31.4 (L)   MCV      82 - 98 fL 86   MCH      27.0 - 31.0 pg 28.8   MCHC      32.0 - 36.0 g/dL 33.4   RDW      11.5 - 14.5 % 14.7 (H)   Platelets      150 - 350 K/uL 157   MPV      9.2 - 12.9 fL 11.6    Immature Granulocytes      0.0 - 0.5 % 0.6 (H)   Gran # (ANC)      1.8 - 7.7 K/uL 5.4   Immature Grans (Abs)      0.00 - 0.04 K/uL 0.04   Lymph #      1.0 - 4.8 K/uL 0.6 (L)   Mono #      0.3 - 1.0 K/uL 0.4   Eos #      0.0 - 0.5 K/uL 0.3   Baso #      0.00 - 0.20 K/uL 0.05   nRBC      0 /100 WBC 0   Gran%      38.0 - 73.0 % 78.7 (H)   Lymph%      18.0 - 48.0 % 8.5 (L)   Mono%      4.0 - 15.0 % 6.5   Eosinophil%      0.0 - 8.0 % 5.0   Basophil%      0.0 - 1.9 % 0.7   Differential Method       Automated     Component      Latest Ref Rng & Units 2/26/2019   WBC      3.90 - 12.70 K/uL 6.82   RBC      4.00 - 5.40 M/uL 3.65 (L)   Hemoglobin      12.0 - 16.0 g/dL 10.5 (L)   Hematocrit      37.0 - 48.5 % 31.4 (L)   MCV      82 - 98 fL 86   MCH      27.0 - 31.0 pg 28.8   MCHC      32.0 - 36.0 g/dL 33.4   RDW      11.5 - 14.5 % 14.7 (H)   Platelets      150 - 350 K/uL 157   MPV      9.2 - 12.9 fL 11.6   Immature Granulocytes      0.0 - 0.5 % 0.6 (H)   Gran # (ANC)      1.8 - 7.7 K/uL 5.4   Immature Grans (Abs)      0.00 - 0.04 K/uL 0.04   Lymph #      1.0 - 4.8 K/uL 0.6 (L)   Mono #      0.3 - 1.0 K/uL 0.4   Eos #      0.0 - 0.5 K/uL 0.3   Baso #      0.00 - 0.20 K/uL 0.05   nRBC      0 /100 WBC 0   Gran%      38.0 - 73.0 % 78.7 (H)   Lymph%      18.0 - 48.0 % 8.5 (L)   Mono%      4.0 - 15.0 % 6.5   Eosinophil%      0.0 - 8.0 % 5.0   Basophil%      0.0 - 1.9 % 0.7   Differential Method       Automated   Sodium      136 - 145 mmol/L 140   Potassium      3.5 - 5.1 mmol/L 2.3 (LL)   Chloride      95 - 110 mmol/L 99   CO2      23 - 29 mmol/L 31 (H)   Glucose      70 - 110 mg/dL 224 (H)   BUN, Bld      8 - 23 mg/dL 16   Creatinine      0.5 - 1.4 mg/dL 0.9   Calcium      8.7 - 10.5 mg/dL 8.8   Total Protein      6.0 - 8.4 g/dL 6.1   Albumin      3.5 - 5.2 g/dL 2.8 (L)   Total Bilirubin      0.1 - 1.0 mg/dL 2.4 (H)   Alkaline Phosphatase      55 - 135 U/L 115   AST      10 - 40 U/L 31   ALT      10 - 44 U/L 19   Anion Gap      8  - 16 mmol/L 10   eGFR if African American      >60 mL/min/1.73 m:2 >60.0   eGFR if non African American      >60 mL/min/1.73 m:2 >60.0   Magnesium      1.6 - 2.6 mg/dL 1.3 (L)         Assessment:     1. Adenocarcinoma of head of pancreas  2. Malignant stricture of duodenum  3. Biliary obstruction  4. Jaundice  5. Weight loss  6. Anemia of unknown etiology  7. Elevated CA 19-9  8. Anorexia  9. Nausea  10. Cancer associated pain  11. Hyperglycemia        Plan:        1,2,3,4: She is s/p biliary and duodenal stents. She is still very jaundiced. Her pancreatic cancer appears to be borderline resectable ( > 180 degree encasement of SMV). No apparent arterial involvement. Case was discussed at upper GI conference this week.   She has been evaluated by surgical oncology. No evidence of metastatic disease on PET CT on 2/8/19. I discussed siddhartha adjuvant chemotherapy with FOLFIRINOX in detail and explained that it is indicated at this time to debulk the tumor and possibly proceed to surgery eventually,Risks and benefits explained in detail, consent obtained.  Bilirubin 2.4 today. She will start C1 FOLFIRINOX without 5FU bolus.          5.8: Secondary to #1. She was evaluated by nutrition here. She is on Marinol. I emphasized the need to maintain calorie count, and maintain adequate calorie intake. She cannot tolerate ENSURE/BOOST. She is looking for lactose free versions.       6. Hgb 10.5 today     9. On Zofran PRN. She also takes Phenergan.      10. On Morphine extended release and IR PRN     11. Possibly secondary to pancreatic insufficiency. No known diagnosis of DM previously.          Distress Screening Results: Psychosocial Distress screening score of Distress Score: 4 noted and reviewed. No intervention indicated.

## 2019-02-26 NOTE — Clinical Note
--potassium iv 40 today--chemo today--pump d/c 2/28--cbc, cmp in 1 wk--cbc, cmp, mag, f/u in 2 wks for chemo

## 2019-02-26 NOTE — PLAN OF CARE
Problem: Adult Inpatient Plan of Care  Goal: Plan of Care Review  Outcome: Ongoing (interventions implemented as appropriate)  Patient tolerated infusion well.  No reactions noted.  5-FU pump infusing upon d/c, instructed pt to RTC this Thursday approx 2:30pm for pump d/c, verbalized understanding.  Chemo spill kit given to patient.  No questions or concerns a this time.  Left unit via w/c with family member.

## 2019-02-28 ENCOUNTER — INFUSION (OUTPATIENT)
Dept: INFUSION THERAPY | Facility: HOSPITAL | Age: 69
End: 2019-02-28
Attending: INTERNAL MEDICINE
Payer: COMMERCIAL

## 2019-02-28 VITALS
BODY MASS INDEX: 28.35 KG/M2 | SYSTOLIC BLOOD PRESSURE: 130 MMHG | RESPIRATION RATE: 18 BRPM | HEIGHT: 63 IN | TEMPERATURE: 99 F | HEART RATE: 92 BPM | DIASTOLIC BLOOD PRESSURE: 62 MMHG | WEIGHT: 160 LBS

## 2019-02-28 DIAGNOSIS — C25.9 PANCREATIC ADENOCARCINOMA: Primary | ICD-10-CM

## 2019-02-28 PROCEDURE — A4216 STERILE WATER/SALINE, 10 ML: HCPCS | Performed by: INTERNAL MEDICINE

## 2019-02-28 PROCEDURE — 63600175 PHARM REV CODE 636 W HCPCS: Mod: JG | Performed by: INTERNAL MEDICINE

## 2019-02-28 PROCEDURE — 96377 APPLICATON ON-BODY INJECTOR: CPT

## 2019-02-28 PROCEDURE — 25000003 PHARM REV CODE 250: Performed by: INTERNAL MEDICINE

## 2019-02-28 RX ORDER — SODIUM CHLORIDE 0.9 % (FLUSH) 0.9 %
10 SYRINGE (ML) INJECTION
Status: DISCONTINUED | OUTPATIENT
Start: 2019-02-28 | End: 2019-02-28 | Stop reason: HOSPADM

## 2019-02-28 RX ORDER — HEPARIN 100 UNIT/ML
500 SYRINGE INTRAVENOUS
Status: DISCONTINUED | OUTPATIENT
Start: 2019-02-28 | End: 2019-02-28 | Stop reason: HOSPADM

## 2019-02-28 RX ADMIN — Medication 10 ML: at 02:02

## 2019-02-28 RX ADMIN — HEPARIN SODIUM (PORCINE) LOCK FLUSH IV SOLN 100 UNIT/ML 500 UNITS: 100 SOLUTION at 02:02

## 2019-02-28 RX ADMIN — PEGFILGRASTIM 6 MG: KIT SUBCUTANEOUS at 02:02

## 2019-02-28 NOTE — PLAN OF CARE
Problem: Adult Inpatient Plan of Care  Goal: Plan of Care Review  Outcome: Ongoing (interventions implemented as appropriate)  Pt infusion complete, bag empty, port flushed per policy and deaccessed without issue, OBI placed to right abdomen activated blinking green prior to d/c, to home, no distress noted upon d/c to home

## 2019-03-01 DIAGNOSIS — C25.9 PANCREATIC ADENOCARCINOMA: ICD-10-CM

## 2019-03-01 RX ORDER — MORPHINE SULFATE 15 MG/1
15 TABLET, FILM COATED, EXTENDED RELEASE ORAL 2 TIMES DAILY
Qty: 60 TABLET | Refills: 0 | Status: SHIPPED | OUTPATIENT
Start: 2019-03-01 | End: 2019-03-21 | Stop reason: SDUPTHER

## 2019-03-02 DIAGNOSIS — C25.9 PANCREATIC ADENOCARCINOMA: ICD-10-CM

## 2019-03-02 DIAGNOSIS — C25.9 MALIGNANT NEOPLASM OF PANCREAS, UNSPECIFIED LOCATION OF MALIGNANCY: ICD-10-CM

## 2019-03-02 RX ORDER — ONDANSETRON HYDROCHLORIDE 8 MG/1
8 TABLET, FILM COATED ORAL EVERY 8 HOURS PRN
Qty: 30 TABLET | Refills: 2 | Status: SHIPPED | OUTPATIENT
Start: 2019-03-02 | End: 2019-04-24 | Stop reason: SDUPTHER

## 2019-03-02 RX ORDER — MORPHINE SULFATE 15 MG/1
30 TABLET ORAL EVERY 4 HOURS PRN
Qty: 90 TABLET | Refills: 0 | Status: SHIPPED | OUTPATIENT
Start: 2019-03-02 | End: 2019-03-22 | Stop reason: SDUPTHER

## 2019-03-04 ENCOUNTER — INFUSION (OUTPATIENT)
Dept: INFUSION THERAPY | Facility: HOSPITAL | Age: 69
End: 2019-03-04
Attending: INTERNAL MEDICINE
Payer: COMMERCIAL

## 2019-03-04 ENCOUNTER — CLINICAL SUPPORT (OUTPATIENT)
Dept: HEMATOLOGY/ONCOLOGY | Facility: CLINIC | Age: 69
End: 2019-03-04
Payer: COMMERCIAL

## 2019-03-04 ENCOUNTER — DOCUMENTATION ONLY (OUTPATIENT)
Dept: HEMATOLOGY/ONCOLOGY | Facility: CLINIC | Age: 69
End: 2019-03-04

## 2019-03-04 ENCOUNTER — TELEPHONE (OUTPATIENT)
Dept: HEMATOLOGY/ONCOLOGY | Facility: CLINIC | Age: 69
End: 2019-03-04

## 2019-03-04 VITALS
SYSTOLIC BLOOD PRESSURE: 148 MMHG | DIASTOLIC BLOOD PRESSURE: 75 MMHG | TEMPERATURE: 99 F | HEART RATE: 80 BPM | RESPIRATION RATE: 18 BRPM

## 2019-03-04 DIAGNOSIS — C25.9 PANCREATIC ADENOCARCINOMA: Primary | ICD-10-CM

## 2019-03-04 LAB
ALBUMIN SERPL BCP-MCNC: 2.8 G/DL
ALP SERPL-CCNC: 91 U/L
ALT SERPL W/O P-5'-P-CCNC: 27 U/L
ANION GAP SERPL CALC-SCNC: 12 MMOL/L
ANISOCYTOSIS BLD QL SMEAR: SLIGHT
AST SERPL-CCNC: 38 U/L
BASOPHILS # BLD AUTO: ABNORMAL K/UL
BASOPHILS NFR BLD: 0 %
BILIRUB SERPL-MCNC: 1.9 MG/DL
BUN SERPL-MCNC: 15 MG/DL
CALCIUM SERPL-MCNC: 8.6 MG/DL
CHLORIDE SERPL-SCNC: 94 MMOL/L
CO2 SERPL-SCNC: 31 MMOL/L
CREAT SERPL-MCNC: 1.1 MG/DL
DIFFERENTIAL METHOD: ABNORMAL
EOSINOPHIL # BLD AUTO: ABNORMAL K/UL
EOSINOPHIL NFR BLD: 0 %
ERYTHROCYTE [DISTWIDTH] IN BLOOD BY AUTOMATED COUNT: 13 %
EST. GFR  (AFRICAN AMERICAN): >60 ML/MIN/1.73 M^2
EST. GFR  (NON AFRICAN AMERICAN): 52.4 ML/MIN/1.73 M^2
GLUCOSE SERPL-MCNC: 310 MG/DL
HCT VFR BLD AUTO: 29 %
HGB BLD-MCNC: 10 G/DL
HYPOCHROMIA BLD QL SMEAR: ABNORMAL
IMM GRANULOCYTES # BLD AUTO: ABNORMAL K/UL
IMM GRANULOCYTES NFR BLD AUTO: ABNORMAL %
LYMPHOCYTES # BLD AUTO: ABNORMAL K/UL
LYMPHOCYTES NFR BLD: 34 %
MAGNESIUM SERPL-MCNC: 1.4 MG/DL
MCH RBC QN AUTO: 28.7 PG
MCHC RBC AUTO-ENTMCNC: 34.5 G/DL
MCV RBC AUTO: 83 FL
MONOCYTES # BLD AUTO: ABNORMAL K/UL
MONOCYTES NFR BLD: 2 %
NEUTROPHILS NFR BLD: 64 %
NRBC BLD-RTO: 0 /100 WBC
OVALOCYTES BLD QL SMEAR: ABNORMAL
PLATELET # BLD AUTO: 83 K/UL
PMV BLD AUTO: 11.2 FL
POIKILOCYTOSIS BLD QL SMEAR: SLIGHT
POLYCHROMASIA BLD QL SMEAR: ABNORMAL
POTASSIUM SERPL-SCNC: 2.5 MMOL/L
PROT SERPL-MCNC: 6.1 G/DL
RBC # BLD AUTO: 3.48 M/UL
SODIUM SERPL-SCNC: 137 MMOL/L
WBC # BLD AUTO: 1.51 K/UL

## 2019-03-04 PROCEDURE — A4216 STERILE WATER/SALINE, 10 ML: HCPCS | Performed by: INTERNAL MEDICINE

## 2019-03-04 PROCEDURE — 85027 COMPLETE CBC AUTOMATED: CPT

## 2019-03-04 PROCEDURE — 63600175 PHARM REV CODE 636 W HCPCS: Performed by: INTERNAL MEDICINE

## 2019-03-04 PROCEDURE — 96367 TX/PROPH/DG ADDL SEQ IV INF: CPT

## 2019-03-04 PROCEDURE — 25000003 PHARM REV CODE 250: Performed by: INTERNAL MEDICINE

## 2019-03-04 PROCEDURE — 96360 HYDRATION IV INFUSION INIT: CPT

## 2019-03-04 PROCEDURE — 80053 COMPREHEN METABOLIC PANEL: CPT

## 2019-03-04 PROCEDURE — 85007 BL SMEAR W/DIFF WBC COUNT: CPT

## 2019-03-04 PROCEDURE — 83735 ASSAY OF MAGNESIUM: CPT

## 2019-03-04 RX ORDER — SODIUM CHLORIDE 0.9 % (FLUSH) 0.9 %
10 SYRINGE (ML) INJECTION
Status: CANCELLED | OUTPATIENT
Start: 2019-03-04

## 2019-03-04 RX ORDER — HEPARIN 100 UNIT/ML
500 SYRINGE INTRAVENOUS
Status: CANCELLED | OUTPATIENT
Start: 2019-03-04

## 2019-03-04 RX ORDER — HEPARIN 100 UNIT/ML
500 SYRINGE INTRAVENOUS
Status: COMPLETED | OUTPATIENT
Start: 2019-03-04 | End: 2019-03-04

## 2019-03-04 RX ORDER — POTASSIUM CHLORIDE 20 MEQ/1
40 TABLET, EXTENDED RELEASE ORAL 2 TIMES DAILY
Qty: 120 TABLET | Refills: 2 | Status: SHIPPED | OUTPATIENT
Start: 2019-03-04

## 2019-03-04 RX ORDER — SODIUM CHLORIDE AND POTASSIUM CHLORIDE 150; 900 MG/100ML; MG/100ML
INJECTION, SOLUTION INTRAVENOUS
Status: COMPLETED | OUTPATIENT
Start: 2019-03-04 | End: 2019-03-04

## 2019-03-04 RX ORDER — SODIUM CHLORIDE 0.9 % (FLUSH) 0.9 %
10 SYRINGE (ML) INJECTION
Status: COMPLETED | OUTPATIENT
Start: 2019-03-04 | End: 2019-03-04

## 2019-03-04 RX ORDER — SODIUM CHLORIDE AND POTASSIUM CHLORIDE 150; 900 MG/100ML; MG/100ML
INJECTION, SOLUTION INTRAVENOUS
Status: CANCELLED | OUTPATIENT
Start: 2019-03-04 | End: 2019-03-04

## 2019-03-04 RX ADMIN — Medication 10 ML: at 01:03

## 2019-03-04 RX ADMIN — POTASSIUM CHLORIDE AND SODIUM CHLORIDE: 900; 150 INJECTION, SOLUTION INTRAVENOUS at 03:03

## 2019-03-04 RX ADMIN — PROMETHAZINE HYDROCHLORIDE 25 MG: 25 INJECTION INTRAMUSCULAR; INTRAVENOUS at 05:03

## 2019-03-04 RX ADMIN — HEPARIN SODIUM (PORCINE) LOCK FLUSH IV SOLN 100 UNIT/ML 500 UNITS: 100 SOLUTION at 01:03

## 2019-03-04 NOTE — TELEPHONE ENCOUNTER
Dr Mcarthur ,   Could you please assist with recommendations. Attempted to contact patient for more information on tolerance. Had trouble reaching .  Would you recommend switching to pill form?

## 2019-03-04 NOTE — TELEPHONE ENCOUNTER
----- Message from Dav Hoyos sent at 3/4/2019  1:12 PM CST -----  Contact: Erika Maloney (Daughter)  Pt unable to keep down Rx potassium chloride 10% (KAYCIEL) 20 mEq/15 mL oral solution, wants to know if the pills can be called in instead.    [facility] [address] [place] [address] EXP AT [address] [address]  Phone: [phone] Fax: [phone]    Contact:: [phone]

## 2019-03-04 NOTE — PROGRESS NOTES
Patient completed chemo class:  Viewed video presentation, received binder that has medication information specific to the patient, participated in Q&A.    Speakers included the Dietician (Judy).   Navigator had one-on-one discussion of patient's treatment regimen.

## 2019-03-04 NOTE — NURSING
Labs drawn from pac, tolerated well.  PAC left accessed, waiting for lab results.  Patient discharged to chemo class

## 2019-03-05 NOTE — PLAN OF CARE
Problem: Nausea and Vomiting (Chemotherapy Effects)  Goal: Fluid and Electrolyte Balance    Intervention: Prevent and Manage Nausea and Vomiting  Tolerated IVF with 20 KCL and nausea medication PAC flushed hep locked de accessed, site covered, vitals stable, pt assisted to w/c leaves clinic with daughter no distress avs discussed with family member reminded to p/u rx for potassium from pharmacy instructed to contact MD office with questions or concerns.

## 2019-03-12 ENCOUNTER — INFUSION (OUTPATIENT)
Dept: INFUSION THERAPY | Facility: HOSPITAL | Age: 69
End: 2019-03-12
Attending: INTERNAL MEDICINE
Payer: COMMERCIAL

## 2019-03-12 ENCOUNTER — OFFICE VISIT (OUTPATIENT)
Dept: HEMATOLOGY/ONCOLOGY | Facility: CLINIC | Age: 69
End: 2019-03-12
Payer: COMMERCIAL

## 2019-03-12 VITALS
RESPIRATION RATE: 18 BRPM | DIASTOLIC BLOOD PRESSURE: 98 MMHG | HEART RATE: 90 BPM | BODY MASS INDEX: 27.39 KG/M2 | TEMPERATURE: 98 F | WEIGHT: 154.56 LBS | SYSTOLIC BLOOD PRESSURE: 109 MMHG | HEIGHT: 63 IN

## 2019-03-12 VITALS
TEMPERATURE: 99 F | BODY MASS INDEX: 27.39 KG/M2 | RESPIRATION RATE: 18 BRPM | HEART RATE: 99 BPM | HEIGHT: 63 IN | SYSTOLIC BLOOD PRESSURE: 113 MMHG | DIASTOLIC BLOOD PRESSURE: 63 MMHG | OXYGEN SATURATION: 94 % | WEIGHT: 154.56 LBS

## 2019-03-12 DIAGNOSIS — G89.3 CANCER ASSOCIATED PAIN: ICD-10-CM

## 2019-03-12 DIAGNOSIS — C25.9 PANCREATIC ADENOCARCINOMA: Primary | ICD-10-CM

## 2019-03-12 DIAGNOSIS — R63.4 WEIGHT LOSS: ICD-10-CM

## 2019-03-12 DIAGNOSIS — T45.1X5A CHEMOTHERAPY-INDUCED NEUTROPENIA: ICD-10-CM

## 2019-03-12 DIAGNOSIS — I10 ESSENTIAL HYPERTENSION: ICD-10-CM

## 2019-03-12 DIAGNOSIS — C25.9 PANCREATIC ADENOCARCINOMA: ICD-10-CM

## 2019-03-12 DIAGNOSIS — D63.0 ANEMIA IN NEOPLASTIC DISEASE: ICD-10-CM

## 2019-03-12 DIAGNOSIS — D69.6 THROMBOCYTOPENIA: ICD-10-CM

## 2019-03-12 DIAGNOSIS — D70.1 CHEMOTHERAPY-INDUCED NEUTROPENIA: ICD-10-CM

## 2019-03-12 DIAGNOSIS — R63.0 ANOREXIA: Primary | ICD-10-CM

## 2019-03-12 DIAGNOSIS — E87.6 HYPOKALEMIA: ICD-10-CM

## 2019-03-12 LAB
ALBUMIN SERPL BCP-MCNC: 2.5 G/DL
ALP SERPL-CCNC: 98 U/L
ALT SERPL W/O P-5'-P-CCNC: 22 U/L
ANION GAP SERPL CALC-SCNC: 11 MMOL/L
ANISOCYTOSIS BLD QL SMEAR: SLIGHT
AST SERPL-CCNC: 28 U/L
BASOPHILS # BLD AUTO: ABNORMAL K/UL
BASOPHILS NFR BLD: 0 %
BILIRUB SERPL-MCNC: 1.5 MG/DL
BUN SERPL-MCNC: 24 MG/DL
CALCIUM SERPL-MCNC: 8.2 MG/DL
CHLORIDE SERPL-SCNC: 99 MMOL/L
CO2 SERPL-SCNC: 27 MMOL/L
CREAT SERPL-MCNC: 1.8 MG/DL
DIFFERENTIAL METHOD: ABNORMAL
EOSINOPHIL # BLD AUTO: ABNORMAL K/UL
EOSINOPHIL NFR BLD: 3.9 %
ERYTHROCYTE [DISTWIDTH] IN BLOOD BY AUTOMATED COUNT: 13.4 %
EST. GFR  (AFRICAN AMERICAN): 33.3 ML/MIN/1.73 M^2
EST. GFR  (NON AFRICAN AMERICAN): 28.9 ML/MIN/1.73 M^2
GLUCOSE SERPL-MCNC: 201 MG/DL
HCT VFR BLD AUTO: 29.2 %
HGB BLD-MCNC: 10.1 G/DL
IMM GRANULOCYTES # BLD AUTO: ABNORMAL K/UL
IMM GRANULOCYTES NFR BLD AUTO: ABNORMAL %
LYMPHOCYTES # BLD AUTO: ABNORMAL K/UL
LYMPHOCYTES NFR BLD: 36.5 %
MAGNESIUM SERPL-MCNC: 0.8 MG/DL
MCH RBC QN AUTO: 29 PG
MCHC RBC AUTO-ENTMCNC: 34.6 G/DL
MCV RBC AUTO: 84 FL
MONOCYTES # BLD AUTO: ABNORMAL K/UL
MONOCYTES NFR BLD: 17.3 %
NEUTROPHILS NFR BLD: 42.3 %
NRBC BLD-RTO: 0 /100 WBC
PLATELET # BLD AUTO: 108 K/UL
PLATELET BLD QL SMEAR: ABNORMAL
PMV BLD AUTO: 10.7 FL
POLYCHROMASIA BLD QL SMEAR: ABNORMAL
POTASSIUM SERPL-SCNC: 2.5 MMOL/L
PROT SERPL-MCNC: 5.6 G/DL
RBC # BLD AUTO: 3.48 M/UL
SODIUM SERPL-SCNC: 137 MMOL/L
WBC # BLD AUTO: 1.37 K/UL

## 2019-03-12 PROCEDURE — 83735 ASSAY OF MAGNESIUM: CPT

## 2019-03-12 PROCEDURE — 96361 HYDRATE IV INFUSION ADD-ON: CPT

## 2019-03-12 PROCEDURE — 63600175 PHARM REV CODE 636 W HCPCS: Performed by: INTERNAL MEDICINE

## 2019-03-12 PROCEDURE — 25000003 PHARM REV CODE 250: Performed by: INTERNAL MEDICINE

## 2019-03-12 PROCEDURE — 85007 BL SMEAR W/DIFF WBC COUNT: CPT

## 2019-03-12 PROCEDURE — 99214 OFFICE O/P EST MOD 30 MIN: CPT | Mod: S$GLB,,, | Performed by: INTERNAL MEDICINE

## 2019-03-12 PROCEDURE — 80053 COMPREHEN METABOLIC PANEL: CPT

## 2019-03-12 PROCEDURE — 3078F PR MOST RECENT DIASTOLIC BLOOD PRESSURE < 80 MM HG: ICD-10-PCS | Mod: CPTII,S$GLB,, | Performed by: INTERNAL MEDICINE

## 2019-03-12 PROCEDURE — 1101F PT FALLS ASSESS-DOCD LE1/YR: CPT | Mod: CPTII,S$GLB,, | Performed by: INTERNAL MEDICINE

## 2019-03-12 PROCEDURE — 36591 DRAW BLOOD OFF VENOUS DEVICE: CPT

## 2019-03-12 PROCEDURE — 96367 TX/PROPH/DG ADDL SEQ IV INF: CPT

## 2019-03-12 PROCEDURE — 99999 PR PBB SHADOW E&M-EST. PATIENT-LVL IV: ICD-10-PCS | Mod: PBBFAC,,, | Performed by: INTERNAL MEDICINE

## 2019-03-12 PROCEDURE — 1101F PR PT FALLS ASSESS DOC 0-1 FALLS W/OUT INJ PAST YR: ICD-10-PCS | Mod: CPTII,S$GLB,, | Performed by: INTERNAL MEDICINE

## 2019-03-12 PROCEDURE — 3078F DIAST BP <80 MM HG: CPT | Mod: CPTII,S$GLB,, | Performed by: INTERNAL MEDICINE

## 2019-03-12 PROCEDURE — 85027 COMPLETE CBC AUTOMATED: CPT

## 2019-03-12 PROCEDURE — 99214 PR OFFICE/OUTPT VISIT, EST, LEVL IV, 30-39 MIN: ICD-10-PCS | Mod: S$GLB,,, | Performed by: INTERNAL MEDICINE

## 2019-03-12 PROCEDURE — 3074F SYST BP LT 130 MM HG: CPT | Mod: CPTII,S$GLB,, | Performed by: INTERNAL MEDICINE

## 2019-03-12 PROCEDURE — A4216 STERILE WATER/SALINE, 10 ML: HCPCS | Performed by: INTERNAL MEDICINE

## 2019-03-12 PROCEDURE — 3074F PR MOST RECENT SYSTOLIC BLOOD PRESSURE < 130 MM HG: ICD-10-PCS | Mod: CPTII,S$GLB,, | Performed by: INTERNAL MEDICINE

## 2019-03-12 PROCEDURE — 99999 PR PBB SHADOW E&M-EST. PATIENT-LVL IV: CPT | Mod: PBBFAC,,, | Performed by: INTERNAL MEDICINE

## 2019-03-12 RX ORDER — POTASSIUM CHLORIDE 7.45 MG/ML
30 INJECTION INTRAVENOUS
Status: COMPLETED | OUTPATIENT
Start: 2019-03-12 | End: 2019-03-12

## 2019-03-12 RX ORDER — LORAZEPAM/0.9% SODIUM CHLORIDE 100MG/0.1L
2 PLASTIC BAG, INJECTION (ML) INTRAVENOUS
Status: CANCELLED | OUTPATIENT
Start: 2019-03-12 | End: 2019-03-12

## 2019-03-12 RX ORDER — SODIUM CHLORIDE 0.9 % (FLUSH) 0.9 %
10 SYRINGE (ML) INJECTION
Status: CANCELLED | OUTPATIENT
Start: 2019-03-12

## 2019-03-12 RX ORDER — POTASSIUM CHLORIDE 7.45 MG/ML
30 INJECTION INTRAVENOUS
Status: CANCELLED | OUTPATIENT
Start: 2019-03-12 | End: 2019-03-12

## 2019-03-12 RX ORDER — SODIUM CHLORIDE AND POTASSIUM CHLORIDE 150; 900 MG/100ML; MG/100ML
INJECTION, SOLUTION INTRAVENOUS
Status: CANCELLED | OUTPATIENT
Start: 2019-03-15 | End: 2019-03-15

## 2019-03-12 RX ORDER — SODIUM CHLORIDE AND POTASSIUM CHLORIDE 150; 900 MG/100ML; MG/100ML
INJECTION, SOLUTION INTRAVENOUS
Status: CANCELLED | OUTPATIENT
Start: 2019-03-12

## 2019-03-12 RX ORDER — POTASSIUM CHLORIDE 7.45 MG/ML
40 INJECTION INTRAVENOUS
Status: CANCELLED | OUTPATIENT
Start: 2019-03-15 | End: 2019-03-15

## 2019-03-12 RX ORDER — POTASSIUM CHLORIDE 7.45 MG/ML
60 INJECTION INTRAVENOUS
Status: CANCELLED | OUTPATIENT
Start: 2019-03-12 | End: 2019-03-12

## 2019-03-12 RX ORDER — LORAZEPAM/0.9% SODIUM CHLORIDE 100MG/0.1L
2 PLASTIC BAG, INJECTION (ML) INTRAVENOUS
Status: CANCELLED | OUTPATIENT
Start: 2019-03-15 | End: 2019-03-15

## 2019-03-12 RX ORDER — HEPARIN 100 UNIT/ML
500 SYRINGE INTRAVENOUS
Status: CANCELLED | OUTPATIENT
Start: 2019-03-12

## 2019-03-12 RX ORDER — HEPARIN 100 UNIT/ML
500 SYRINGE INTRAVENOUS
Status: COMPLETED | OUTPATIENT
Start: 2019-03-12 | End: 2019-03-12

## 2019-03-12 RX ORDER — LORAZEPAM/0.9% SODIUM CHLORIDE 100MG/0.1L
2 PLASTIC BAG, INJECTION (ML) INTRAVENOUS
Status: COMPLETED | OUTPATIENT
Start: 2019-03-12 | End: 2019-03-12

## 2019-03-12 RX ORDER — DIPHENOXYLATE HYDROCHLORIDE AND ATROPINE SULFATE 2.5; .025 MG/1; MG/1
1 TABLET ORAL 4 TIMES DAILY PRN
Qty: 30 TABLET | Refills: 1 | Status: SHIPPED | OUTPATIENT
Start: 2019-03-12 | End: 2019-04-08 | Stop reason: SDUPTHER

## 2019-03-12 RX ORDER — SODIUM CHLORIDE AND POTASSIUM CHLORIDE 150; 900 MG/100ML; MG/100ML
INJECTION, SOLUTION INTRAVENOUS
Status: COMPLETED | OUTPATIENT
Start: 2019-03-12 | End: 2019-03-12

## 2019-03-12 RX ORDER — SODIUM CHLORIDE 0.9 % (FLUSH) 0.9 %
10 SYRINGE (ML) INJECTION
Status: COMPLETED | OUTPATIENT
Start: 2019-03-12 | End: 2019-03-12

## 2019-03-12 RX ADMIN — MAGNESIUM SULFATE IN WATER 2 G: 40 INJECTION, SOLUTION INTRAVENOUS at 12:03

## 2019-03-12 RX ADMIN — Medication 10 ML: at 09:03

## 2019-03-12 RX ADMIN — POTASSIUM CHLORIDE AND SODIUM CHLORIDE: 900; 150 INJECTION, SOLUTION INTRAVENOUS at 12:03

## 2019-03-12 RX ADMIN — POTASSIUM CHLORIDE 10 MEQ: 10 INJECTION, SOLUTION INTRAVENOUS at 01:03

## 2019-03-12 RX ADMIN — HEPARIN SODIUM (PORCINE) LOCK FLUSH IV SOLN 100 UNIT/ML 500 UNITS: 100 SOLUTION at 09:03

## 2019-03-12 NOTE — PLAN OF CARE
Problem: Adult Inpatient Plan of Care  Goal: Plan of Care Review  Outcome: Ongoing (interventions implemented as appropriate)  Finished potassium. Left with daughter via w/c Still having diarrhea.

## 2019-03-12 NOTE — Clinical Note
--no chemo today--cbc, cmp  On 3/18--chemo on 3/19--d/c pump 3/21--cbc, cmp, mag, ca 19-9, f/u on 4/1

## 2019-03-12 NOTE — NURSING
Patient here for blood draw from right chest port-accesses easily with good blood return-blood to lab-line flushed and left accessed for chemo today-patient tolerated well-patient returned later to have needle removed.

## 2019-03-12 NOTE — PROGRESS NOTES
CC: Pancreatic adenocarcinoma, follow up      HPI:  is a 67y/o lady with recently diagnosed pancreatic cancer. She has hypertension.  She has history of flank/ back pain for over a year. She describes this as constant, achy, progressively worsening . This had impaired her ADLs and interrupted her sleep. She has recent weight loss--15lb since Dec 2018. She has decreased appetite. She had also noticed change in stool to light color. She had colonoscopy on 1/8/2019, suffered complications. She was told she had perforated an ulcer. She was admitted to [facility]  for 9 days. CT scan done with oral contrast via NG tube r/t poor renal function. She was discharged 1/16/19.   She was told she had a pancreatic mass. She was evaluated here by . She continues to have jaundice, and back pain.   She is still not eating well.      Interval history:  She is here for a follow up. She is still not eating well. She continues to have nausea, abdominal pain. She has finished 1 cycle of FOLFIRINOX on 2/26/19. She had nausea, emesis and diarrhea after C1.        Review of Systems   Constitutional: Positive for malaise/fatigue and weight loss. Negative for chills, diaphoresis and fever.   HENT: Negative for ear discharge and ear pain.    Eyes: Negative for blurred vision, double vision and photophobia.   Respiratory: Negative for cough and sputum production.    Cardiovascular: Negative for chest pain, palpitations and orthopnea.   Gastrointestinal: Positive for abdominal pain and nausea. Negative for constipation, diarrhea, heartburn and vomiting.   Genitourinary: Negative for dysuria and urgency.   Musculoskeletal: Negative for back pain, myalgias and neck pain.   Skin: Negative for rash.   Neurological: Positive for weakness. Negative for dizziness, tingling, tremors, sensory change, speech change and headaches.   Endo/Heme/Allergies: Does not bruise/bleed easily.   Psychiatric/Behavioral: Negative for depression,  substance abuse and suicidal ideas.       Current Outpatient Medications   Medication Sig    bisoprolol-hydrochlorothiazide (ZIAC) 10-6.25 mg per tablet Take 1 tablet by mouth 2 (two) times daily.    dronabinol (MARINOL) 5 MG capsule Take 1 capsule (5 mg total) by mouth 2 (two) times daily before meals.    hydrALAZINE (APRESOLINE) 50 MG tablet Take 75 mg by mouth 3 (three) times daily.    irbesartan (AVAPRO) 150 MG tablet Take 150 mg by mouth every evening.    LORazepam (ATIVAN) 1 MG tablet Take 1 mg by mouth every 8 (eight) hours as needed for Anxiety.    morphine (MS CONTIN) 15 MG 12 hr tablet Take 1 tablet (15 mg total) by mouth 2 (two) times daily.    morphine (MSIR) 15 MG tablet Take 2 tablets (30 mg total) by mouth every 4 (four) hours as needed for Pain.    ondansetron (ZOFRAN) 8 MG tablet Take 1 tablet (8 mg total) by mouth every 8 (eight) hours as needed for Nausea.    pantoprazole (PROTONIX) 40 MG tablet Take 40 mg by mouth once daily.    potassium chloride SA (K-DUR,KLOR-CON) 20 MEQ tablet Take 2 tablets (40 mEq total) by mouth 2 (two) times daily.    promethazine (PHENERGAN) 25 MG tablet Take 1 tablet (25 mg total) by mouth every 6 (six) hours as needed for Nausea.     Current Facility-Administered Medications   Medication    potassium chloride CR tablet 40 mEq         Vitals:    03/12/19 1004   BP: 113/63   Pulse: 99   Resp: 18   Temp: 99.1 °F (37.3 °C)         Physical Exam   Constitutional: She is oriented to person, place, and time. She appears well-developed.   HENT:   Head: Normocephalic and atraumatic.   Mouth/Throat: No oropharyngeal exudate.   Eyes: Pupils are equal, round, and reactive to light. No scleral icterus.   Neck: Normal range of motion.   Cardiovascular: Normal rate and regular rhythm.   No murmur heard.  Pulmonary/Chest: Effort normal and breath sounds normal. No respiratory distress. She has no wheezes.   Abdominal: She exhibits no distension. There is tenderness. There  is no rebound.   Musculoskeletal: She exhibits no edema.   Lymphadenopathy:     She has no cervical adenopathy.   Neurological: She is alert and oriented to person, place, and time. No cranial nerve deficit.   Skin: Skin is warm.   Psychiatric: She has a normal mood and affect.      1/21/19 CT chest, abdomen and pelvis with cont             FINDINGS:  The soft tissues at the base of the neck are unremarkable.  The thyroid gland is normal in size and configuration.  Hypodensity in the right lateral aspect of the thyroid isthmus measures 0.7 cm.  There is no abnormal lymph node enlargement.    There is no axillary, mediastinal, or hilar lymphadenopathy.  The hilar contours are unremarkable.  The esophagus is normal in course and contour.    The thoracic aorta is normal in course, caliber, and contour without significant atherosclerotic calcifications.The heart does not appear enlarged and there is no pericardial effusion.    The trachea and proximal airways are patent.  The lungs are symmetrically expanded and demonstrate no convincing evidence of consolidation, pleural thickening, pneumothorax, or pleural fluid.  Right hemidiaphragmatic elevation.    The liver is prominent in size measuring 18.1 cm in craniocaudal dimension and slightly decreased in attenuation reflecting hepatic steatosis.  There are several hypodensities in the right hepatic lobe at the periphery, largest measuring 1.4 cm, which are favored to represent cysts.  The gallbladder is distended without evidence of calcified gallstones, or wall thickening, or character pericholecystic fluid.  There is hepatic subcapsular fluid collection posterior to the gallbladder which measures approximately 3.3 x 1.2 cm.  There is marked intra and extrahepatic biliary ductal dilatation with the common bile duct measuring up to 1.6 cm in transverse dimension.    There is an ill-defined hypodense solid lesion in the head of the pancreas measuring 4.3 x 4.5 x 4.4 cm (AP  x TV x CC; axial series 3, image 114 and coronal series 601, image 52) with associated pancreatic ductal dilatation with the pancreatic duct measuring up to 1.1 cm.  Assessment of the portal vein, splenic vein, SMV demonstrates pancreatic mass surrounding the superior mesenteric vein (greater than 180 degrees), also vessel contour producing concentric narrowing of the lumen consistent with vascular encasement.  The IVC and celiac and superior mesenteric arteries are not involved.    The stomach, spleen, pancreas, and adrenal glands are unremarkable.    The kidneys are normal in size and location.  Bilateral renal hypodensities too small to fully characterize, though may represent cysts.  There is no evidence of hydronephrosis.  The ureters appear normal in course and caliber without evidence of ureteral dilatation. The urinary bladder demonstrates no significant abnormality. Operative change of prior hysterectomy with small approximately 2 cm cystic lesion in the left adnexa likely an ovarian cyst.    The abdominal aorta is normal in course and caliber without significant atherosclerotic calcifications.    The visualized loops of small bowel show no evidence of obstruction or inflammation. Large bowel is unremarkable.  High density material in the appendix, likely an appendicolith.  There is no intraperitoneal free air.  Mild pelvic ascites.  There is no evidence of lymph node enlargement in the abdomen or pelvis.    When viewed with bone windows the osseous structures are unremarkable.  Fat containing umbilical hernia.       Impression         Ill-defined hypodense solid lesion of the head the pancreas concerning for pancreatic adenocarcinoma with associated intrahepatic and extrahepatic biliary ductal dilatation, pancreatic ductal dilatation and associated encasement of the superior mesenteric vein as detailed above.  There are multiple normal sized regional lymph nodes.    Left adnexal hypodensity, likely a cyst.   This can be followed with routine oncologic surveillance.    Additional findings of hypodense right thyroid nodule, hepatomegaly with hepatic steatosis, right hepatic cysts, bilateral renal cysts, and umbilical hernia.    RECIST SUMMARY:    Date of prior examination for comparison: Baseline study    Lesion 1: Pancreatic head mass.  4.5 cm. Series 2 image 138.  New lesion.                  1/25/19 EGD/EUS           ENDOSCOPIC FINDING: :       There is no endoscopic evidence of bezoar/food (residue) or fluid collections in the entire examined stomach (stomach appears to be  emptying).       An acquired extrinsic severe stenosis was found in the first portion of the duodenum and was traversed (with the EGD scope, but could not  be traversed with the EUS scope).       ENDOSONOGRAPHIC FINDING: :       The esophagus, stomach and duodenum and adjacent structures were visualized endosonographically. Endosonographic imaging in the visualized portion of the liver showed no lesion.       A mass was identified in the pancreatic head. The mass was  hypoechoic. The mass measured 41 mm by 35 mm in maximal  cross-sectional diameter. The outer margins were irregular. There  was sonographic evidence suggesting invasion into the superior        mesenteric vein (manifested by encasement) and peripancreatic fat. The remainder of the pancreas was examined. The endosonographic appearance of parenchyma and the upstream pancreatic duct indicated        duct dilation, a tortuous/ectatic duct and parenchymal atrophy.    Impression:           - Acquired duodenal stenosis.                        - A mass was identified in the pancreatic head. This was staged T3 Nx Mx by endosonographic  criteria. The staging applies if malignancy is confirmed.                        - No specimens collected.  Recommendation:       - Discharge patient to home (ambulatory).                        - Resume previous diet; Discharge to home                          (ambulatory); Resume outpatient medications        2/1/19 ERCP        Findings:      ENDOSCOPIC  FINDING:The examined esophagus was endoscopically normal. The entire examined stomach was endoscopically normal. The examined duodenum was endoscopically normal. A small amount of food (residue) was  found in the gastric antrum. An acquired malignant-appearing, intrinsic severe stenosis was found in the first portion of the duodenum and was non-traversed.      ENDOSONOGRAPHIC FINDING:The esophagus stomach and duodenum and adjacent structures were visualized endosonographically. A mass was identified in the pancreatic head. There was dilation in the common hepatic duct which measured up to 15 mm. The decision was made to make a bile duct  puncture into the common bile duct from the duodenal bulb. Under EUS guidance a 19guage needle was used to puncture into the  Common hepatic duct. 20cc's of dark bile was aspirated. A cholangiogram was performed through the 19 g needle,       A guidewire was advanced through the EUS needle and into the intrahepatic biliary tree. A cautery-tipped, lumen-apposing stent delivery system was advanced across the transduodenal puncture site and into the biliary system using cautery. A 10x 10mm lumen-apposing stent (Axios) was placed into the biliary tree, with one end in the extrahepatic duct and the other in the duodenal. Good drainage of bile and contrast was noted post stent placement. The wire remained in place and a 5fr 5cm double pigtail stent was placed across the LAMSthe stent was seen to pigtail int he common hepatic duct and in the duodenal bulb.       After succesful biliary drainage, the duodenal stricture was stented  with a 22 mm x 12 cm WallFlex stent under fluoroscopic guidance.    Impression:           Succesful palliation of malignant biliary and gastric obstruction with choledochoduodenstomy and duodenal stent placement           2/8/19 PET CT             COMPARISON:  CT  chest abdomen pelvis with contrast 01/21/2019    FINDINGS:  Quality of the study: Adequate.    There is redemonstration of an ill-defined pancreatic head mass which demonstrates hypermetabolic activity (SUV max 5.78).  This mass measures at least 4.7 x 4.3 cm with distal pancreatic duct dilation.  When compared to CT examination dated 01/21/2019, there has been interval placement of a duodenal stent as well as choledochojejunostomy with stent placement.  There is moderate pneumobilia throughout both left and right hepatic lobes.    There is a small focus of hypermetabolic soft tissue nodularity along the proximal sigmoid colon best appreciated on axial CT image 205.  This finding may relate to physiologic bowel activity, however per chart review this patient underwent complicated colonoscopy and inflammation of the site of complication may present similarly.  Additionally, neoplasia including 2nd synchronous primary or metastatic disease cannot be entirely excluded.    Index lesions:    *Pancreatic head mass: SUV max 5.78 (axial fused CT image 140).  *Sigmoid colon: SUV max 9.72 (axial fused CT image 205).  Physiologic uptake of the tracer is present within the brain, salivary glands, myocardium, GI and  tracts.    Incidental CT findings: Noncontrast CT demonstrates no acute abnormality.       Impression         Hypermetabolic pancreatic head mass consistent with patient's known pancreatic adenocarcinoma.    Focal, hypermetabolic soft tissue thickening along the proximal sigmoid colon may relate to physiologic bowel activity or inflammation from recent colonoscopy complication, however neoplasia including 2nd synchronous primary or metastatic disease cannot be entirely excluded.          Component      Latest Ref Rng & Units 3/12/2019   WBC      3.90 - 12.70 K/uL 1.37 (LL)   RBC      4.00 - 5.40 M/uL 3.48 (L)   Hemoglobin      12.0 - 16.0 g/dL 10.1 (L)   Hematocrit      37.0 - 48.5 % 29.2 (L)   MCV      82 - 98 fL  84   MCH      27.0 - 31.0 pg 29.0   MCHC      32.0 - 36.0 g/dL 34.6   RDW      11.5 - 14.5 % 13.4   Platelets      150 - 350 K/uL 108 (L)   MPV      9.2 - 12.9 fL 10.7            Assessment:     1. Adenocarcinoma of head of pancreas  2. Malignant stricture of duodenum  3. Biliary obstruction  4. Jaundice  5. Weight loss  6. Anemia secondary to chemotherapy  7. Elevated CA 19-9  8. Anorexia  9. Nausea  10. Cancer associated pain  11. Hyperglycemia  12. Leukopenia  13. Neutropenia  14. thrombocytopenia        Plan:        1,2,3,4: She is s/p biliary and duodenal stents. She is still very jaundiced. Her pancreatic cancer appears to be borderline resectable ( > 180 degree encasement of SMV). No apparent arterial involvement. Case was discussed at upper GI conference this week.   She has been evaluated by surgical oncology. No evidence of metastatic disease on PET CT on 2/8/19. I discussed siddhartha adjuvant chemotherapy with FOLFIRINOX in detail and explained that it is indicated at this time to debulk the tumor and possibly proceed to surgery eventually,Risks and benefits explained in detail, consent obtained.  Bilirubin 2.4 today. She will start C1 FOLFIRINOX without 5FU bolus.          5.8: Secondary to #1. She was evaluated by nutrition here. She is on Marinol. I emphasized the need to maintain calorie count, and maintain adequate calorie intake. She cannot tolerate ENSURE/BOOST. She is looking for lactose free versions.       6. Hgb 10.1 today     9. On Zofran PRN. She also takes Phenergan.      10. On Morphine extended release and IR PRN      11. Possibly secondary to pancreatic insufficiency. No known diagnosis of DM previously.    12,13: Secondary to chemotherapy. She did receive Neulasta after C1.  She is afebrile today.     14. Mild, asymptomatic, secondary to chemortherapy            Distress Screening Results: Psychosocial Distress screening score of Distress Score: 3 noted and reviewed. No intervention  indicated.

## 2019-03-17 ENCOUNTER — PATIENT MESSAGE (OUTPATIENT)
Dept: HEMATOLOGY/ONCOLOGY | Facility: CLINIC | Age: 69
End: 2019-03-17

## 2019-03-18 ENCOUNTER — INFUSION (OUTPATIENT)
Dept: INFUSION THERAPY | Facility: HOSPITAL | Age: 69
End: 2019-03-18
Attending: INTERNAL MEDICINE
Payer: COMMERCIAL

## 2019-03-18 DIAGNOSIS — C25.9 MALIGNANT NEOPLASM OF PANCREAS, UNSPECIFIED LOCATION OF MALIGNANCY: Primary | ICD-10-CM

## 2019-03-18 DIAGNOSIS — C25.9 PANCREATIC ADENOCARCINOMA: ICD-10-CM

## 2019-03-18 LAB
ALBUMIN SERPL BCP-MCNC: 2.3 G/DL
ALP SERPL-CCNC: 114 U/L
ALT SERPL W/O P-5'-P-CCNC: 21 U/L
ANION GAP SERPL CALC-SCNC: 9 MMOL/L
AST SERPL-CCNC: 25 U/L
BASOPHILS # BLD AUTO: 0.04 K/UL
BASOPHILS NFR BLD: 0.7 %
BILIRUB SERPL-MCNC: 1.2 MG/DL
BUN SERPL-MCNC: 21 MG/DL
CALCIUM SERPL-MCNC: 8.1 MG/DL
CHLORIDE SERPL-SCNC: 103 MMOL/L
CO2 SERPL-SCNC: 24 MMOL/L
CREAT SERPL-MCNC: 1.2 MG/DL
DIFFERENTIAL METHOD: ABNORMAL
EOSINOPHIL # BLD AUTO: 0.1 K/UL
EOSINOPHIL NFR BLD: 1.2 %
ERYTHROCYTE [DISTWIDTH] IN BLOOD BY AUTOMATED COUNT: 13.6 %
EST. GFR  (AFRICAN AMERICAN): 54.4 ML/MIN/1.73 M^2
EST. GFR  (NON AFRICAN AMERICAN): 47.2 ML/MIN/1.73 M^2
GLUCOSE SERPL-MCNC: 173 MG/DL
HCT VFR BLD AUTO: 29.8 %
HGB BLD-MCNC: 9.7 G/DL
IMM GRANULOCYTES # BLD AUTO: 0.04 K/UL
IMM GRANULOCYTES NFR BLD AUTO: 0.7 %
LYMPHOCYTES # BLD AUTO: 0.6 K/UL
LYMPHOCYTES NFR BLD: 11.3 %
MCH RBC QN AUTO: 28.7 PG
MCHC RBC AUTO-ENTMCNC: 32.6 G/DL
MCV RBC AUTO: 88 FL
MONOCYTES # BLD AUTO: 0.4 K/UL
MONOCYTES NFR BLD: 6.9 %
NEUTROPHILS # BLD AUTO: 4.5 K/UL
NEUTROPHILS NFR BLD: 79.2 %
NRBC BLD-RTO: 0 /100 WBC
PLATELET # BLD AUTO: 115 K/UL
PMV BLD AUTO: 11.5 FL
POTASSIUM SERPL-SCNC: 3.2 MMOL/L
PROT SERPL-MCNC: 5.3 G/DL
RBC # BLD AUTO: 3.38 M/UL
SODIUM SERPL-SCNC: 136 MMOL/L
WBC # BLD AUTO: 5.65 K/UL

## 2019-03-18 PROCEDURE — 85025 COMPLETE CBC W/AUTO DIFF WBC: CPT

## 2019-03-18 PROCEDURE — 36591 DRAW BLOOD OFF VENOUS DEVICE: CPT

## 2019-03-18 PROCEDURE — 80053 COMPREHEN METABOLIC PANEL: CPT

## 2019-03-18 RX ORDER — HEPARIN 100 UNIT/ML
500 SYRINGE INTRAVENOUS
Status: DISCONTINUED | OUTPATIENT
Start: 2019-03-18 | End: 2019-03-18 | Stop reason: HOSPADM

## 2019-03-18 RX ORDER — SODIUM CHLORIDE 0.9 % (FLUSH) 0.9 %
10 SYRINGE (ML) INJECTION
Status: DISCONTINUED | OUTPATIENT
Start: 2019-03-18 | End: 2019-03-18 | Stop reason: HOSPADM

## 2019-03-18 RX ORDER — SODIUM CHLORIDE 0.9 % (FLUSH) 0.9 %
10 SYRINGE (ML) INJECTION
Status: CANCELLED | OUTPATIENT
Start: 2019-03-18

## 2019-03-18 RX ORDER — HEPARIN 100 UNIT/ML
500 SYRINGE INTRAVENOUS
Status: CANCELLED | OUTPATIENT
Start: 2019-03-18

## 2019-03-18 NOTE — NURSING
Patient presents for lab collection.  Patient reports uncontrolled N/V and black stools for last 4 days.  Notified clinic. Awaiting response.  Labs collected and sent down to lab.

## 2019-03-19 ENCOUNTER — TELEPHONE (OUTPATIENT)
Dept: ENDOSCOPY | Facility: HOSPITAL | Age: 69
End: 2019-03-19

## 2019-03-19 ENCOUNTER — INFUSION (OUTPATIENT)
Dept: INFUSION THERAPY | Facility: HOSPITAL | Age: 69
End: 2019-03-19
Attending: INTERNAL MEDICINE
Payer: COMMERCIAL

## 2019-03-19 VITALS
DIASTOLIC BLOOD PRESSURE: 75 MMHG | RESPIRATION RATE: 20 BRPM | TEMPERATURE: 99 F | HEART RATE: 83 BPM | SYSTOLIC BLOOD PRESSURE: 127 MMHG | OXYGEN SATURATION: 98 %

## 2019-03-19 DIAGNOSIS — C25.9 PANCREATIC ADENOCARCINOMA: Primary | ICD-10-CM

## 2019-03-19 DIAGNOSIS — K92.1 MELENA: Primary | ICD-10-CM

## 2019-03-19 PROCEDURE — 25000003 PHARM REV CODE 250: Performed by: INTERNAL MEDICINE

## 2019-03-19 PROCEDURE — 96361 HYDRATE IV INFUSION ADD-ON: CPT

## 2019-03-19 PROCEDURE — 63600175 PHARM REV CODE 636 W HCPCS: Performed by: INTERNAL MEDICINE

## 2019-03-19 PROCEDURE — 96365 THER/PROPH/DIAG IV INF INIT: CPT

## 2019-03-19 RX ORDER — SODIUM CHLORIDE AND POTASSIUM CHLORIDE 150; 900 MG/100ML; MG/100ML
INJECTION, SOLUTION INTRAVENOUS
Status: COMPLETED | OUTPATIENT
Start: 2019-03-19 | End: 2019-03-19

## 2019-03-19 RX ORDER — ONDANSETRON HCL IN 0.9 % NACL 8 MG/50 ML
8 INTRAVENOUS SOLUTION, PIGGYBACK (ML) INTRAVENOUS
Status: COMPLETED | OUTPATIENT
Start: 2019-03-19 | End: 2019-03-19

## 2019-03-19 RX ORDER — POTASSIUM CHLORIDE 20 MEQ/1
40 TABLET, EXTENDED RELEASE ORAL ONCE
Status: COMPLETED | OUTPATIENT
Start: 2019-03-19 | End: 2019-03-19

## 2019-03-19 RX ADMIN — POTASSIUM CHLORIDE 40 MEQ: 1500 TABLET, EXTENDED RELEASE ORAL at 10:03

## 2019-03-19 RX ADMIN — POTASSIUM CHLORIDE AND SODIUM CHLORIDE: 900; 150 INJECTION, SOLUTION INTRAVENOUS at 10:03

## 2019-03-19 RX ADMIN — ONDANSETRON 8 MG: 2 INJECTION, SOLUTION INTRAMUSCULAR; INTRAVENOUS at 10:03

## 2019-03-19 NOTE — TELEPHONE ENCOUNTER
You did an ERCP in Feb 2019. I am going to offer a  General GI appointment. But do you want to order any blood work?

## 2019-03-19 NOTE — TELEPHONE ENCOUNTER
----- Message from Faviola Jiménez sent at 3/19/2019  1:28 PM CDT -----  Contact: Flor, daughter, [phone]  Patient Requesting Sooner Appointment.     Reason for sooner appt.: Black Stool   When is the first available appointment? Unable to access  Communication Preference: lFor, daughter, [phone]  Additional Information:

## 2019-03-19 NOTE — PLAN OF CARE
Problem: Adult Inpatient Plan of Care  Goal: Plan of Care Review  Outcome: Outcome(s) achieved Date Met: 03/19/19  Patient in clinic for FOLFIRINOX infusions. Patient reporting dizziness, SOB, and N/V that has not improved since last week. No longer having dark stools - patient took Pepto-bismol last week. VS stable. Patient has concerns with continuing treatment today - Dr. Mcarthur notified. MD gave TVO for IVFs w/K, PO K, and zofran. Port accessed without difficulty. Will proceed with new treatment orders.

## 2019-03-19 NOTE — PLAN OF CARE
Problem: Adult Inpatient Plan of Care  Goal: Patient-Specific Goal (Individualization)  Outcome: Ongoing (interventions implemented as appropriate)  Tolerated infusions without complications. Report improved nausea after zofran. Appointments scheduled for next week. AVS provided. Port flushed, heparin locked, and de-accessed. Patient to report worsening or new symptoms. Discharged home with daughter.

## 2019-03-20 ENCOUNTER — PATIENT MESSAGE (OUTPATIENT)
Dept: GASTROENTEROLOGY | Facility: CLINIC | Age: 69
End: 2019-03-20

## 2019-03-20 NOTE — TELEPHONE ENCOUNTER
MD Daija Reddy MA   Caller: Unspecified (Yesterday,  2:01 PM)             Cbc, thanks      Please order

## 2019-03-21 ENCOUNTER — TELEPHONE (OUTPATIENT)
Dept: ENDOSCOPY | Facility: HOSPITAL | Age: 69
End: 2019-03-21

## 2019-03-21 ENCOUNTER — OFFICE VISIT (OUTPATIENT)
Dept: GASTROENTEROLOGY | Facility: CLINIC | Age: 69
End: 2019-03-21
Payer: COMMERCIAL

## 2019-03-21 ENCOUNTER — PATIENT MESSAGE (OUTPATIENT)
Dept: HEMATOLOGY/ONCOLOGY | Facility: CLINIC | Age: 69
End: 2019-03-21

## 2019-03-21 VITALS
SYSTOLIC BLOOD PRESSURE: 124 MMHG | HEART RATE: 88 BPM | BODY MASS INDEX: 28.32 KG/M2 | WEIGHT: 159.81 LBS | DIASTOLIC BLOOD PRESSURE: 85 MMHG | HEIGHT: 63 IN

## 2019-03-21 DIAGNOSIS — K92.1 MELENA: ICD-10-CM

## 2019-03-21 DIAGNOSIS — D64.9 ANEMIA, UNSPECIFIED TYPE: ICD-10-CM

## 2019-03-21 DIAGNOSIS — R11.2 NAUSEA AND VOMITING, INTRACTABILITY OF VOMITING NOT SPECIFIED, UNSPECIFIED VOMITING TYPE: ICD-10-CM

## 2019-03-21 DIAGNOSIS — C25.9 MALIGNANT NEOPLASM OF PANCREAS, UNSPECIFIED LOCATION OF MALIGNANCY: Primary | ICD-10-CM

## 2019-03-21 DIAGNOSIS — C25.9 PANCREATIC ADENOCARCINOMA: ICD-10-CM

## 2019-03-21 PROCEDURE — 3074F PR MOST RECENT SYSTOLIC BLOOD PRESSURE < 130 MM HG: ICD-10-PCS | Mod: CPTII,S$GLB,, | Performed by: NURSE PRACTITIONER

## 2019-03-21 PROCEDURE — 99999 PR PBB SHADOW E&M-EST. PATIENT-LVL III: CPT | Mod: PBBFAC,,, | Performed by: NURSE PRACTITIONER

## 2019-03-21 PROCEDURE — 1101F PR PT FALLS ASSESS DOC 0-1 FALLS W/OUT INJ PAST YR: ICD-10-PCS | Mod: CPTII,S$GLB,, | Performed by: NURSE PRACTITIONER

## 2019-03-21 PROCEDURE — 99204 OFFICE O/P NEW MOD 45 MIN: CPT | Mod: S$GLB,,, | Performed by: NURSE PRACTITIONER

## 2019-03-21 PROCEDURE — 1101F PT FALLS ASSESS-DOCD LE1/YR: CPT | Mod: CPTII,S$GLB,, | Performed by: NURSE PRACTITIONER

## 2019-03-21 PROCEDURE — 99999 PR PBB SHADOW E&M-EST. PATIENT-LVL III: ICD-10-PCS | Mod: PBBFAC,,, | Performed by: NURSE PRACTITIONER

## 2019-03-21 PROCEDURE — 3079F PR MOST RECENT DIASTOLIC BLOOD PRESSURE 80-89 MM HG: ICD-10-PCS | Mod: CPTII,S$GLB,, | Performed by: NURSE PRACTITIONER

## 2019-03-21 PROCEDURE — 99204 PR OFFICE/OUTPT VISIT, NEW, LEVL IV, 45-59 MIN: ICD-10-PCS | Mod: S$GLB,,, | Performed by: NURSE PRACTITIONER

## 2019-03-21 PROCEDURE — 3079F DIAST BP 80-89 MM HG: CPT | Mod: CPTII,S$GLB,, | Performed by: NURSE PRACTITIONER

## 2019-03-21 PROCEDURE — 3074F SYST BP LT 130 MM HG: CPT | Mod: CPTII,S$GLB,, | Performed by: NURSE PRACTITIONER

## 2019-03-21 RX ORDER — PROMETHAZINE HYDROCHLORIDE 25 MG/1
25 TABLET ORAL EVERY 6 HOURS PRN
Qty: 60 TABLET | Refills: 0 | Status: SHIPPED | OUTPATIENT
Start: 2019-03-21 | End: 2019-04-06 | Stop reason: SDUPTHER

## 2019-03-21 RX ORDER — MORPHINE SULFATE 15 MG/1
30 TABLET ORAL EVERY 4 HOURS PRN
Qty: 90 TABLET | Refills: 0 | Status: CANCELLED | OUTPATIENT
Start: 2019-03-21

## 2019-03-21 RX ORDER — MORPHINE SULFATE 15 MG/1
15 TABLET, FILM COATED, EXTENDED RELEASE ORAL 2 TIMES DAILY
Qty: 60 TABLET | Refills: 0 | Status: ON HOLD | OUTPATIENT
Start: 2019-03-21 | End: 2019-05-22 | Stop reason: HOSPADM

## 2019-03-21 NOTE — TELEPHONE ENCOUNTER
Patient and daughter in office to schedule EGD.  Offered next day appointment along with other earlier  available dates.  She did not want anything sooner than date chosen.  Scheduled for 4/25/19 at 9:00 am with Dr Nelson.

## 2019-03-21 NOTE — PROGRESS NOTES
[facility] Consultation Note    Reason for Consult:  The primary encounter diagnosis was Malignant neoplasm of pancreas, unspecified location of malignancy. Diagnoses of Nausea and vomiting, intractability of vomiting not specified, unspecified vomiting type, Melena, and Anemia, unspecified type were also pertinent to this visit.    PCP:   Maikel Cesar   [address] / [place] [address]    Referring MD:  Maikel Cesar Md  [address]  [place] [address]    HPI:  This is a 68 y.o. female here for evaluation of melena. She was recently diagnosed with pancreatic cancer earlier this year. Today she is having abdominal pain, N/V and diarrhea.    Everything  She eats she vomits or diarrhea or sometimes both. Taking zofran and phenergan for the nausea and sometimes has relief. This started before her dx of CA. She had seen Dr. Reddy for an EGD/ Colonoscopy 1/8/2018. There was a ulcer found during the upper scope and it was perforated - had to be admitted to ICU at [facility].   Lab work has not been stable for chemo lately.  Diarrhea started with chemo. Last dose Feb 26. Oncologist prescribed lomitil 3/12/19. Started 2 days ago and it has improved.  Melena- Started March 15 and lasted a couple of days. Took Pepto around that time for stomach discomfort. After the Pepto she noticed the black stools. After she stopped taking the pepto the black stools have stopped  Taking protonix every morning. Has feelings of foods getting stuck in chest. Reports belching, reflux, N/V, wt loss. Protonix helps with reflux.  Reports dizziness, SOB, fatigue    Abdominal pain. Reports abdominal pain  Character: Cramping  Location:Lower  Frequency:  Constant  Duration: Constant  Onset: Several Months Ago - this is what lead her to seek treatment with the scopes with Dr. Vegas  Worse with: its always there  Improves with: Narcotic Pain meds  Associated with Bm:   Nocturnal pain:  yes  Antidepressants:  Using narcotic pain medication:yes            ROS:  Constitutional: No fevers, +chills, No weight loss  CV: No chest pain  Pulm: No cough, + shortness of breath  Ophtho: No vision changes  GI: see HPI  Derm: No rash. +thinning hair  Heme:  No bruising  MSK: No arthritis  : No dysuria  Endo: No hot  Intolerance or + cold intolerance  Neuro: No syncope  Psych: + anxiety, + depression    Medical History:  has a past medical history of Biliary obstruction, Duodenal stenosis, Hypertension, and Jaundice.    Surgical History:  has a past surgical history that includes Hysterectomy; Foot surgery (Right, 2012); Esophagogastroduodenoscopy (N/A, 1/25/2019); Endoscopic ultrasound of upper gastrointestinal tract (N/A, 1/25/2019); ERCP (N/A, 1/25/2019); Esophagogastroduodenoscopy (N/A, 2/1/2019); Endoscopic ultrasound of upper gastrointestinal tract (N/A, 2/1/2019); and Insertion of tunneled central venous catheter (CVC) with subcutaneous port (N/A, 2/22/2019).    Family History: family history includes Cancer in her cousin, paternal aunt, and paternal uncle; Heart disease in her mother; Stroke in her father..     Social History:  reports that  has never smoked. she has never used smokeless tobacco. She reports that she does not drink alcohol or use drugs.    Review of patient's allergies indicates:  No Known Allergies    Current Outpatient Medications on File Prior to Visit   Medication Sig Dispense Refill    bisoprolol-hydrochlorothiazide (ZIAC) 10-6.25 mg per tablet Take 1 tablet by mouth 2 (two) times daily.  1    diphenoxylate-atropine 2.5-0.025 mg (LOMOTIL) 2.5-0.025 mg per tablet Take 1 tablet by mouth 4 (four) times daily as needed for Diarrhea. 30 tablet 1    dronabinol (MARINOL) 5 MG capsule Take 1 capsule (5 mg total) by mouth 2 (two) times daily before meals. 60 capsule 2    hydrALAZINE (APRESOLINE) 50 MG tablet Take 75 mg by mouth 3 (three) times daily.  1    LORazepam (ATIVAN) 1 MG  "tablet Take 1 mg by mouth every 8 (eight) hours as needed for Anxiety.      morphine (MSIR) 15 MG tablet Take 2 tablets (30 mg total) by mouth every 4 (four) hours as needed for Pain. 90 tablet 0    ondansetron (ZOFRAN) 8 MG tablet Take 1 tablet (8 mg total) by mouth every 8 (eight) hours as needed for Nausea. 30 tablet 2    pantoprazole (PROTONIX) 40 MG tablet Take 40 mg by mouth once daily.  1    potassium chloride SA (K-DUR,KLOR-CON) 20 MEQ tablet Take 2 tablets (40 mEq total) by mouth 2 (two) times daily. 120 tablet 2    [DISCONTINUED] morphine (MS CONTIN) 15 MG 12 hr tablet Take 1 tablet (15 mg total) by mouth 2 (two) times daily. 60 tablet 0    [DISCONTINUED] promethazine (PHENERGAN) 25 MG tablet Take 1 tablet (25 mg total) by mouth every 6 (six) hours as needed for Nausea. 60 tablet 0    [DISCONTINUED] irbesartan (AVAPRO) 150 MG tablet Take 150 mg by mouth every evening.  0     Current Facility-Administered Medications on File Prior to Visit   Medication Dose Route Frequency Provider Last Rate Last Dose    potassium chloride CR tablet 40 mEq  40 mEq Oral 1 time in Clinic/HOD Rigoberto Mcarthur MD             Objective Findings:    Vital Signs:  /85   Pulse 88   Ht 5' 3" (1.6 m)   Wt 72.5 kg (159 lb 13.3 oz)   BMI 28.31 kg/m²   Body mass index is 28.31 kg/m².    Physical Exam:  General Appearance: Ill appearing in no acute distress  Head:   Normocephalic, without obvious abnormality  Eyes:    No scleral icterus  ENT: Neck supple  Lungs: CTA bilaterally in anterior and posterior fields, no wheezes, no crackles.  Heart:  Regular rate and rhythm, S1, S2 normal, no murmurs heard  Abdomen: Soft, non tender, non distended with positive bowel sounds in all four quadrants.   Skin: No rash  Neurologic: AAO x 3      Labs:  Lab Results   Component Value Date    WBC 5.65 03/18/2019    HGB 9.7 (L) 03/18/2019    HCT 29.8 (L) 03/18/2019     (L) 03/18/2019    ALT 21 03/18/2019    AST 25 03/18/2019    NA " 136 03/18/2019    K 3.2 (L) 03/18/2019     03/18/2019    CREATININE 1.2 03/18/2019    BUN 21 03/18/2019    CO2 24 03/18/2019    INR 1.2 02/22/2019       Imaging:  None    Endoscopy:    EUS 1/25/2019 - Pancreatic Head Mass  ERCP 2/1/2019 - Duodenal stent placement     Assessment:  68 y.o. Female here for melena. Had two days of black stools around 3/15/19. This was experienced after she took pepto bismol for an upset stomach. When she stopped the pepto the melena stopped. She does report abdominal pain, N/V, and diarrhea. The abdominal pain that radiated to the back and nausea vomiting started right before the dx of pancreatic CA - those sx were what caused her to seek treatment. She is also having feelings like food gets stuck, however this has been going on for a while and has happened in the past. Recent EUS and ERCP did not mention esophageal stenosis or ulcers.   At this time since she is no longer having dark stools I do not feel an EGD is warranted for evaluation of melena. I discussed with patient if the the melena reoccurs she should let us know. However I did mention that her dysphagia would warrant evaluation - she stated since it was not new and has been going on for a while. She is hesitant about the EGD as the one that was performed at [facility] caused a perforation. She plans on scheduling her EGD and if she changes her mind she will call us to cancel it.  Since the diarrhea started with the chemo it may be a side effect of the chemo. Within the past day or two she started lomotil and has had some improvement. Message sent in her [employer] that if her diarrhea does not improve with lomotil then I can order stool studies to r/o infection.    Recommendations:  1. If melena reoccurs let us know.   2. EGD   3. If lomotil does not improve diarrhea, will order stool studies to r/o infection    No Follow-up on file.      Order summary:  Orders Placed This Encounter    promethazine (PHENERGAN) 25 MG  tablet    Case request GI: EGD (ESOPHAGOGASTRODUODENOSCOPY)         Thank you so much for allowing me to participate in the care of Roopa Ford, FRIDAC

## 2019-03-21 NOTE — LETTER
March 21, 2019      Maikel Cesar MD  [address] [place] [address]  [facility] [address]           [facility]  [address]  [place] [address]  Phone: [phone]  Fax: [phone]          Patient: Roopa Hanley   MR Number: [id number]   YOB: 1950   Date of Visit: 3/21/2019       Dear Dr. Maikel Cesar:    Thank you for referring Roopa Hanley to me for evaluation. Attached you will find relevant portions of my assessment and plan of care.    If you have questions, please do not hesitate to call me. I look forward to following Roopa Hanley along with you.    Sincerely,    Vivi Ford, KIERRA    Enclosure  CC:  No Recipients    If you would like to receive this communication electronically, please contact [email or web address] or [phone] to request more information on "[email or web address] Link access.    For providers and/or their staff who would like to refer a patient to [facility], please contact us through our one-stop-shop provider referral line, [facility], at [phone].    If you feel you have received this communication in error or would no longer like to receive these types of communications, please e-mail [email or web address]

## 2019-03-22 DIAGNOSIS — C25.9 PANCREATIC ADENOCARCINOMA: ICD-10-CM

## 2019-03-22 RX ORDER — MORPHINE SULFATE 15 MG/1
30 TABLET ORAL EVERY 4 HOURS PRN
Qty: 90 TABLET | Refills: 0 | Status: SHIPPED | OUTPATIENT
Start: 2019-03-22 | End: 2019-04-16 | Stop reason: SDUPTHER

## 2019-03-26 ENCOUNTER — INFUSION (OUTPATIENT)
Dept: INFUSION THERAPY | Facility: HOSPITAL | Age: 69
End: 2019-03-26
Attending: INTERNAL MEDICINE
Payer: COMMERCIAL

## 2019-03-26 ENCOUNTER — NURSE TRIAGE (OUTPATIENT)
Dept: ADMINISTRATIVE | Facility: CLINIC | Age: 69
End: 2019-03-26

## 2019-03-26 ENCOUNTER — OFFICE VISIT (OUTPATIENT)
Dept: HEMATOLOGY/ONCOLOGY | Facility: CLINIC | Age: 69
End: 2019-03-26
Payer: COMMERCIAL

## 2019-03-26 VITALS
WEIGHT: 157.44 LBS | HEIGHT: 63 IN | RESPIRATION RATE: 18 BRPM | TEMPERATURE: 99 F | BODY MASS INDEX: 27.89 KG/M2 | SYSTOLIC BLOOD PRESSURE: 131 MMHG | DIASTOLIC BLOOD PRESSURE: 58 MMHG | HEART RATE: 88 BPM

## 2019-03-26 VITALS
HEIGHT: 63 IN | OXYGEN SATURATION: 99 % | DIASTOLIC BLOOD PRESSURE: 99 MMHG | WEIGHT: 157.44 LBS | HEART RATE: 88 BPM | TEMPERATURE: 99 F | BODY MASS INDEX: 27.89 KG/M2 | SYSTOLIC BLOOD PRESSURE: 158 MMHG

## 2019-03-26 DIAGNOSIS — C25.9 PANCREATIC ADENOCARCINOMA: ICD-10-CM

## 2019-03-26 DIAGNOSIS — C25.9 MALIGNANT NEOPLASM OF PANCREAS, UNSPECIFIED LOCATION OF MALIGNANCY: Primary | ICD-10-CM

## 2019-03-26 DIAGNOSIS — N17.9 AKI (ACUTE KIDNEY INJURY): ICD-10-CM

## 2019-03-26 DIAGNOSIS — E83.42 HYPOMAGNESEMIA: ICD-10-CM

## 2019-03-26 DIAGNOSIS — C25.9 PANCREATIC ADENOCARCINOMA: Primary | ICD-10-CM

## 2019-03-26 DIAGNOSIS — E87.6 HYPOKALEMIA: ICD-10-CM

## 2019-03-26 DIAGNOSIS — I10 ESSENTIAL HYPERTENSION: ICD-10-CM

## 2019-03-26 DIAGNOSIS — R53.0 NEOPLASTIC MALIGNANT RELATED FATIGUE: ICD-10-CM

## 2019-03-26 DIAGNOSIS — R63.4 WEIGHT LOSS: ICD-10-CM

## 2019-03-26 DIAGNOSIS — D63.0 ANEMIA IN NEOPLASTIC DISEASE: ICD-10-CM

## 2019-03-26 DIAGNOSIS — R63.0 ANOREXIA: Primary | ICD-10-CM

## 2019-03-26 DIAGNOSIS — G89.3 CANCER ASSOCIATED PAIN: ICD-10-CM

## 2019-03-26 LAB
ALBUMIN SERPL BCP-MCNC: 2.3 G/DL (ref 3.5–5.2)
ALP SERPL-CCNC: 152 U/L (ref 55–135)
ALT SERPL W/O P-5'-P-CCNC: 23 U/L (ref 10–44)
ANION GAP SERPL CALC-SCNC: 12 MMOL/L (ref 8–16)
AST SERPL-CCNC: 38 U/L (ref 10–40)
BASOPHILS # BLD AUTO: 0.06 K/UL (ref 0–0.2)
BASOPHILS NFR BLD: 0.8 % (ref 0–1.9)
BILIRUB SERPL-MCNC: 1 MG/DL (ref 0.1–1)
BUN SERPL-MCNC: 33 MG/DL (ref 8–23)
CALCIUM SERPL-MCNC: 8.1 MG/DL (ref 8.7–10.5)
CANCER AG19-9 SERPL-ACNC: ABNORMAL U/ML (ref 2–40)
CHLORIDE SERPL-SCNC: 106 MMOL/L (ref 95–110)
CO2 SERPL-SCNC: 20 MMOL/L (ref 23–29)
CREAT SERPL-MCNC: 1.7 MG/DL (ref 0.5–1.4)
DIFFERENTIAL METHOD: ABNORMAL
EOSINOPHIL # BLD AUTO: 0.1 K/UL (ref 0–0.5)
EOSINOPHIL NFR BLD: 1.7 % (ref 0–8)
ERYTHROCYTE [DISTWIDTH] IN BLOOD BY AUTOMATED COUNT: 13.9 % (ref 11.5–14.5)
EST. GFR  (AFRICAN AMERICAN): 35.7 ML/MIN/1.73 M^2
EST. GFR  (NON AFRICAN AMERICAN): 31 ML/MIN/1.73 M^2
GLUCOSE SERPL-MCNC: 175 MG/DL (ref 70–110)
HCT VFR BLD AUTO: 30.8 % (ref 37–48.5)
HGB BLD-MCNC: 9.8 G/DL (ref 12–16)
IMM GRANULOCYTES # BLD AUTO: 0.04 K/UL (ref 0–0.04)
IMM GRANULOCYTES NFR BLD AUTO: 0.5 % (ref 0–0.5)
LYMPHOCYTES # BLD AUTO: 0.8 K/UL (ref 1–4.8)
LYMPHOCYTES NFR BLD: 9.7 % (ref 18–48)
MAGNESIUM SERPL-MCNC: 1 MG/DL (ref 1.6–2.6)
MCH RBC QN AUTO: 28.2 PG (ref 27–31)
MCHC RBC AUTO-ENTMCNC: 31.8 G/DL (ref 32–36)
MCV RBC AUTO: 89 FL (ref 82–98)
MONOCYTES # BLD AUTO: 0.5 K/UL (ref 0.3–1)
MONOCYTES NFR BLD: 6.6 % (ref 4–15)
NEUTROPHILS # BLD AUTO: 6.3 K/UL (ref 1.8–7.7)
NEUTROPHILS NFR BLD: 80.7 % (ref 38–73)
NRBC BLD-RTO: 0 /100 WBC
PLATELET # BLD AUTO: 126 K/UL (ref 150–350)
PMV BLD AUTO: 11.6 FL (ref 9.2–12.9)
POTASSIUM SERPL-SCNC: 3.3 MMOL/L (ref 3.5–5.1)
PROT SERPL-MCNC: 5.7 G/DL (ref 6–8.4)
RBC # BLD AUTO: 3.47 M/UL (ref 4–5.4)
SODIUM SERPL-SCNC: 138 MMOL/L (ref 136–145)
WBC # BLD AUTO: 7.82 K/UL (ref 3.9–12.7)

## 2019-03-26 PROCEDURE — 63600175 PHARM REV CODE 636 W HCPCS: Performed by: INTERNAL MEDICINE

## 2019-03-26 PROCEDURE — 83735 ASSAY OF MAGNESIUM: CPT

## 2019-03-26 PROCEDURE — 1101F PR PT FALLS ASSESS DOC 0-1 FALLS W/OUT INJ PAST YR: ICD-10-PCS | Mod: CPTII,S$GLB,, | Performed by: INTERNAL MEDICINE

## 2019-03-26 PROCEDURE — 96417 CHEMO IV INFUS EACH ADDL SEQ: CPT

## 2019-03-26 PROCEDURE — 96415 CHEMO IV INFUSION ADDL HR: CPT

## 2019-03-26 PROCEDURE — 25000003 PHARM REV CODE 250: Performed by: INTERNAL MEDICINE

## 2019-03-26 PROCEDURE — 96413 CHEMO IV INFUSION 1 HR: CPT

## 2019-03-26 PROCEDURE — 96367 TX/PROPH/DG ADDL SEQ IV INF: CPT

## 2019-03-26 PROCEDURE — 3077F PR MOST RECENT SYSTOLIC BLOOD PRESSURE >= 140 MM HG: ICD-10-PCS | Mod: CPTII,S$GLB,, | Performed by: INTERNAL MEDICINE

## 2019-03-26 PROCEDURE — 1101F PT FALLS ASSESS-DOCD LE1/YR: CPT | Mod: CPTII,S$GLB,, | Performed by: INTERNAL MEDICINE

## 2019-03-26 PROCEDURE — 96375 TX/PRO/DX INJ NEW DRUG ADDON: CPT

## 2019-03-26 PROCEDURE — 80053 COMPREHEN METABOLIC PANEL: CPT

## 2019-03-26 PROCEDURE — 3077F SYST BP >= 140 MM HG: CPT | Mod: CPTII,S$GLB,, | Performed by: INTERNAL MEDICINE

## 2019-03-26 PROCEDURE — 96368 THER/DIAG CONCURRENT INF: CPT

## 2019-03-26 PROCEDURE — 3080F PR MOST RECENT DIASTOLIC BLOOD PRESSURE >= 90 MM HG: ICD-10-PCS | Mod: CPTII,S$GLB,, | Performed by: INTERNAL MEDICINE

## 2019-03-26 PROCEDURE — 99999 PR PBB SHADOW E&M-EST. PATIENT-LVL III: CPT | Mod: PBBFAC,,, | Performed by: INTERNAL MEDICINE

## 2019-03-26 PROCEDURE — 99214 OFFICE O/P EST MOD 30 MIN: CPT | Mod: S$GLB,,, | Performed by: INTERNAL MEDICINE

## 2019-03-26 PROCEDURE — 99214 PR OFFICE/OUTPT VISIT, EST, LEVL IV, 30-39 MIN: ICD-10-PCS | Mod: S$GLB,,, | Performed by: INTERNAL MEDICINE

## 2019-03-26 PROCEDURE — A4216 STERILE WATER/SALINE, 10 ML: HCPCS | Performed by: INTERNAL MEDICINE

## 2019-03-26 PROCEDURE — 3080F DIAST BP >= 90 MM HG: CPT | Mod: CPTII,S$GLB,, | Performed by: INTERNAL MEDICINE

## 2019-03-26 PROCEDURE — 99999 PR PBB SHADOW E&M-EST. PATIENT-LVL III: ICD-10-PCS | Mod: PBBFAC,,, | Performed by: INTERNAL MEDICINE

## 2019-03-26 PROCEDURE — 85025 COMPLETE CBC W/AUTO DIFF WBC: CPT

## 2019-03-26 PROCEDURE — 36415 COLL VENOUS BLD VENIPUNCTURE: CPT

## 2019-03-26 PROCEDURE — 86301 IMMUNOASSAY TUMOR CA 19-9: CPT

## 2019-03-26 RX ORDER — SODIUM CHLORIDE 0.9 % (FLUSH) 0.9 %
10 SYRINGE (ML) INJECTION
Status: CANCELLED | OUTPATIENT
Start: 2019-03-26

## 2019-03-26 RX ORDER — EPINEPHRINE 0.3 MG/.3ML
0.3 INJECTION SUBCUTANEOUS ONCE AS NEEDED
Status: CANCELLED | OUTPATIENT
Start: 2019-03-26

## 2019-03-26 RX ORDER — DIPHENHYDRAMINE HYDROCHLORIDE 50 MG/ML
50 INJECTION INTRAMUSCULAR; INTRAVENOUS ONCE AS NEEDED
Status: DISCONTINUED | OUTPATIENT
Start: 2019-03-26 | End: 2019-03-26 | Stop reason: HOSPADM

## 2019-03-26 RX ORDER — HEPARIN 100 UNIT/ML
500 SYRINGE INTRAVENOUS
Status: DISCONTINUED | OUTPATIENT
Start: 2019-03-26 | End: 2019-03-26 | Stop reason: HOSPADM

## 2019-03-26 RX ORDER — HEPARIN 100 UNIT/ML
500 SYRINGE INTRAVENOUS
Status: CANCELLED | OUTPATIENT
Start: 2019-03-26

## 2019-03-26 RX ORDER — LORAZEPAM/0.9% SODIUM CHLORIDE 100MG/0.1L
2 PLASTIC BAG, INJECTION (ML) INTRAVENOUS
Status: CANCELLED | OUTPATIENT
Start: 2019-03-26

## 2019-03-26 RX ORDER — HEPARIN 100 UNIT/ML
500 SYRINGE INTRAVENOUS
Status: COMPLETED | OUTPATIENT
Start: 2019-03-26 | End: 2019-03-26

## 2019-03-26 RX ORDER — HEPARIN 100 UNIT/ML
500 SYRINGE INTRAVENOUS
Status: CANCELLED | OUTPATIENT
Start: 2019-03-28

## 2019-03-26 RX ORDER — ATROPINE SULFATE 0.4 MG/ML
0.4 INJECTION, SOLUTION ENDOTRACHEAL; INTRAMEDULLARY; INTRAMUSCULAR; INTRAVENOUS; SUBCUTANEOUS ONCE AS NEEDED
Status: CANCELLED | OUTPATIENT
Start: 2019-03-26

## 2019-03-26 RX ORDER — EPINEPHRINE 0.3 MG/.3ML
0.3 INJECTION SUBCUTANEOUS ONCE AS NEEDED
Status: DISCONTINUED | OUTPATIENT
Start: 2019-03-26 | End: 2019-03-26 | Stop reason: HOSPADM

## 2019-03-26 RX ORDER — SODIUM CHLORIDE 0.9 % (FLUSH) 0.9 %
10 SYRINGE (ML) INJECTION
Status: CANCELLED | OUTPATIENT
Start: 2019-03-28

## 2019-03-26 RX ORDER — LORAZEPAM/0.9% SODIUM CHLORIDE 100MG/0.1L
2 PLASTIC BAG, INJECTION (ML) INTRAVENOUS
Status: COMPLETED | OUTPATIENT
Start: 2019-03-26 | End: 2019-03-26

## 2019-03-26 RX ORDER — DIPHENHYDRAMINE HYDROCHLORIDE 50 MG/ML
50 INJECTION INTRAMUSCULAR; INTRAVENOUS ONCE AS NEEDED
Status: CANCELLED | OUTPATIENT
Start: 2019-03-26

## 2019-03-26 RX ORDER — SODIUM CHLORIDE 0.9 % (FLUSH) 0.9 %
10 SYRINGE (ML) INJECTION
Status: COMPLETED | OUTPATIENT
Start: 2019-03-26 | End: 2019-03-26

## 2019-03-26 RX ORDER — ATROPINE SULFATE 0.4 MG/ML
0.4 INJECTION, SOLUTION ENDOTRACHEAL; INTRAMEDULLARY; INTRAMUSCULAR; INTRAVENOUS; SUBCUTANEOUS ONCE AS NEEDED
Status: COMPLETED | OUTPATIENT
Start: 2019-03-26 | End: 2019-03-26

## 2019-03-26 RX ORDER — SODIUM CHLORIDE 0.9 % (FLUSH) 0.9 %
10 SYRINGE (ML) INJECTION
Status: DISCONTINUED | OUTPATIENT
Start: 2019-03-26 | End: 2019-03-26 | Stop reason: HOSPADM

## 2019-03-26 RX ADMIN — DEXAMETHASONE SODIUM PHOSPHATE: 4 INJECTION, SOLUTION INTRAMUSCULAR; INTRAVENOUS at 12:03

## 2019-03-26 RX ADMIN — LEUCOVORIN CALCIUM 740 MG: 500 INJECTION, POWDER, LYOPHILIZED, FOR SOLUTION INTRAMUSCULAR; INTRAVENOUS at 03:03

## 2019-03-26 RX ADMIN — FLUOROURACIL 4440 MG: 50 INJECTION, SOLUTION INTRAVENOUS at 05:03

## 2019-03-26 RX ADMIN — OXALIPLATIN 157 MG: 5 INJECTION, SOLUTION, CONCENTRATE INTRAVENOUS at 01:03

## 2019-03-26 RX ADMIN — ATROPINE SULFATE 0.4 MG: 0.4 INJECTION, SOLUTION INTRAMUSCULAR; INTRAVENOUS; SUBCUTANEOUS at 03:03

## 2019-03-26 RX ADMIN — HEPARIN 500 UNITS: 100 SYRINGE at 09:03

## 2019-03-26 RX ADMIN — IRINOTECAN HYDROCHLORIDE 232 MG: 20 INJECTION, SOLUTION INTRAVENOUS at 03:03

## 2019-03-26 RX ADMIN — MAGNESIUM SULFATE IN WATER 2 G: 40 INJECTION, SOLUTION INTRAVENOUS at 03:03

## 2019-03-26 RX ADMIN — Medication 10 ML: at 09:03

## 2019-03-26 RX ADMIN — DEXTROSE: 50 INJECTION, SOLUTION INTRAVENOUS at 12:03

## 2019-03-26 NOTE — PLAN OF CARE
Problem: Adult Inpatient Plan of Care  Goal: Plan of Care Review  Outcome: Ongoing (interventions implemented as appropriate)  Pt received Folfirinox and IV Mag: tolerated well. CADD pump infusing 5FU without complications; all connections secured. VSS and NAD. Pt instructed to call MD with any concerns. Pt discharged home independently. Pt to RTC Thursday at 15:00 for pump D/C and fluids.

## 2019-03-26 NOTE — PROGRESS NOTES
CC: Pancreatic adenocarcinoma, follow up      HPI:  is a 67y/o lady with recently diagnosed pancreatic cancer. She has hypertension.  She has history of flank/ back pain for over a year. She describes this as constant, achy, progressively worsening . This had impaired her ADLs and interrupted her sleep. She has recent weight loss--15lb since Dec 2018. She has decreased appetite. She had also noticed change in stool to light color. She had colonoscopy on 1/8/2019, suffered complications. She was told she had perforated an ulcer. She was admitted to [facility]  for 9 days. CT scan done with oral contrast via NG tube r/t poor renal function. She was discharged 1/16/19.   She was told she had a pancreatic mass. She was evaluated here by . She continues to have jaundice, and back pain.   She is still not eating well.      Interval history:  She is here for a follow up. She is still not eating well. She continues to have nausea, abdominal pain. She has finished 1 cycle of FOLFIRINOX on 2/26/19. She had nausea, emesis and diarrhea after C1.         Review of Systems   Constitutional: Positive for malaise/fatigue and weight loss. Negative for fever.   HENT: Negative for ear discharge, ear pain and nosebleeds.    Eyes: Negative for blurred vision, double vision, photophobia and pain.   Respiratory: Positive for shortness of breath. Negative for hemoptysis and sputum production.    Cardiovascular: Negative for chest pain, palpitations, orthopnea and claudication.   Gastrointestinal: Positive for nausea. Negative for abdominal pain, constipation, diarrhea, heartburn and vomiting.   Genitourinary: Negative for dysuria, frequency and urgency.   Musculoskeletal: Negative for back pain, myalgias and neck pain.   Skin: Negative for rash.   Neurological: Negative for tingling, tremors, sensory change, speech change and headaches.   Endo/Heme/Allergies: Does not bruise/bleed easily.   Psychiatric/Behavioral: Negative  for depression, hallucinations, substance abuse and suicidal ideas.            Current Outpatient Medications   Medication Sig    bisoprolol-hydrochlorothiazide (ZIAC) 10-6.25 mg per tablet Take 1 tablet by mouth 2 (two) times daily.    diphenoxylate-atropine 2.5-0.025 mg (LOMOTIL) 2.5-0.025 mg per tablet Take 1 tablet by mouth 4 (four) times daily as needed for Diarrhea.    dronabinol (MARINOL) 5 MG capsule Take 1 capsule (5 mg total) by mouth 2 (two) times daily before meals.    hydrALAZINE (APRESOLINE) 50 MG tablet Take 75 mg by mouth 3 (three) times daily.    LORazepam (ATIVAN) 1 MG tablet Take 1 mg by mouth every 8 (eight) hours as needed for Anxiety.    morphine (MS CONTIN) 15 MG 12 hr tablet Take 1 tablet (15 mg total) by mouth 2 (two) times daily.    morphine (MSIR) 15 MG tablet Take 2 tablets (30 mg total) by mouth every 4 (four) hours as needed for Pain.    ondansetron (ZOFRAN) 8 MG tablet Take 1 tablet (8 mg total) by mouth every 8 (eight) hours as needed for Nausea.    pantoprazole (PROTONIX) 40 MG tablet Take 40 mg by mouth once daily.    potassium chloride SA (K-DUR,KLOR-CON) 20 MEQ tablet Take 2 tablets (40 mEq total) by mouth 2 (two) times daily.    promethazine (PHENERGAN) 25 MG tablet Take 1 tablet (25 mg total) by mouth every 6 (six) hours as needed for Nausea.     Current Facility-Administered Medications   Medication    potassium chloride CR tablet 40 mEq         Vitals:    03/26/19 1056   BP: (!) 158/99   Pulse: 88   Temp: 99.3 °F (37.4 °C)     Pain 0    Physical Exam   Constitutional: She is oriented to person, place, and time. She appears well-developed.   HENT:   Head: Normocephalic and atraumatic.   Mouth/Throat: No oropharyngeal exudate.   Eyes: Pupils are equal, round, and reactive to light. No scleral icterus.   Neck: Normal range of motion.   Cardiovascular: Normal rate and regular rhythm.   No murmur heard.  Pulmonary/Chest: Effort normal and breath sounds normal. No  respiratory distress. She has no wheezes. She has no rales.   Abdominal: Bowel sounds are normal. She exhibits no distension. There is no tenderness. There is no rebound.   Musculoskeletal: She exhibits edema.   Lymphadenopathy:     She has no cervical adenopathy.   Neurological: She is alert and oriented to person, place, and time. No cranial nerve deficit.   Skin: Skin is warm.   Psychiatric: She has a normal mood and affect.       1/21/19 CT chest, abdomen and pelvis with cont             FINDINGS:  The soft tissues at the base of the neck are unremarkable.  The thyroid gland is normal in size and configuration.  Hypodensity in the right lateral aspect of the thyroid isthmus measures 0.7 cm.  There is no abnormal lymph node enlargement.    There is no axillary, mediastinal, or hilar lymphadenopathy.  The hilar contours are unremarkable.  The esophagus is normal in course and contour.    The thoracic aorta is normal in course, caliber, and contour without significant atherosclerotic calcifications.The heart does not appear enlarged and there is no pericardial effusion.    The trachea and proximal airways are patent.  The lungs are symmetrically expanded and demonstrate no convincing evidence of consolidation, pleural thickening, pneumothorax, or pleural fluid.  Right hemidiaphragmatic elevation.    The liver is prominent in size measuring 18.1 cm in craniocaudal dimension and slightly decreased in attenuation reflecting hepatic steatosis.  There are several hypodensities in the right hepatic lobe at the periphery, largest measuring 1.4 cm, which are favored to represent cysts.  The gallbladder is distended without evidence of calcified gallstones, or wall thickening, or character pericholecystic fluid.  There is hepatic subcapsular fluid collection posterior to the gallbladder which measures approximately 3.3 x 1.2 cm.  There is marked intra and extrahepatic biliary ductal dilatation with the common bile duct  measuring up to 1.6 cm in transverse dimension.    There is an ill-defined hypodense solid lesion in the head of the pancreas measuring 4.3 x 4.5 x 4.4 cm (AP x TV x CC; axial series 3, image 114 and coronal series 601, image 52) with associated pancreatic ductal dilatation with the pancreatic duct measuring up to 1.1 cm.  Assessment of the portal vein, splenic vein, SMV demonstrates pancreatic mass surrounding the superior mesenteric vein (greater than 180 degrees), also vessel contour producing concentric narrowing of the lumen consistent with vascular encasement.  The IVC and celiac and superior mesenteric arteries are not involved.    The stomach, spleen, pancreas, and adrenal glands are unremarkable.    The kidneys are normal in size and location.  Bilateral renal hypodensities too small to fully characterize, though may represent cysts.  There is no evidence of hydronephrosis.  The ureters appear normal in course and caliber without evidence of ureteral dilatation. The urinary bladder demonstrates no significant abnormality. Operative change of prior hysterectomy with small approximately 2 cm cystic lesion in the left adnexa likely an ovarian cyst.    The abdominal aorta is normal in course and caliber without significant atherosclerotic calcifications.    The visualized loops of small bowel show no evidence of obstruction or inflammation. Large bowel is unremarkable.  High density material in the appendix, likely an appendicolith.  There is no intraperitoneal free air.  Mild pelvic ascites.  There is no evidence of lymph node enlargement in the abdomen or pelvis.    When viewed with bone windows the osseous structures are unremarkable.  Fat containing umbilical hernia.       Impression         Ill-defined hypodense solid lesion of the head the pancreas concerning for pancreatic adenocarcinoma with associated intrahepatic and extrahepatic biliary ductal dilatation, pancreatic ductal dilatation and associated  encasement of the superior mesenteric vein as detailed above.  There are multiple normal sized regional lymph nodes.    Left adnexal hypodensity, likely a cyst.  This can be followed with routine oncologic surveillance.    Additional findings of hypodense right thyroid nodule, hepatomegaly with hepatic steatosis, right hepatic cysts, bilateral renal cysts, and umbilical hernia.    RECIST SUMMARY:    Date of prior examination for comparison: Baseline study    Lesion 1: Pancreatic head mass.  4.5 cm. Series 2 image 138.  New lesion.                  1/25/19 EGD/EUS           ENDOSCOPIC FINDING: :       There is no endoscopic evidence of bezoar/food (residue) or fluid collections in the entire examined stomach (stomach appears to be  emptying).       An acquired extrinsic severe stenosis was found in the first portion of the duodenum and was traversed (with the EGD scope, but could not  be traversed with the EUS scope).       ENDOSONOGRAPHIC FINDING: :       The esophagus, stomach and duodenum and adjacent structures were visualized endosonographically. Endosonographic imaging in the visualized portion of the liver showed no lesion.       A mass was identified in the pancreatic head. The mass was  hypoechoic. The mass measured 41 mm by 35 mm in maximal  cross-sectional diameter. The outer margins were irregular. There  was sonographic evidence suggesting invasion into the superior        mesenteric vein (manifested by encasement) and peripancreatic fat. The remainder of the pancreas was examined. The endosonographic appearance of parenchyma and the upstream pancreatic duct indicated        duct dilation, a tortuous/ectatic duct and parenchymal atrophy.    Impression:           - Acquired duodenal stenosis.                        - A mass was identified in the pancreatic head. This was staged T3 Nx Mx by endosonographic  criteria. The staging applies if malignancy is confirmed.                        - No specimens  collected.  Recommendation:       - Discharge patient to home (ambulatory).                        - Resume previous diet; Discharge to home                         (ambulatory); Resume outpatient medications        2/1/19 ERCP        Findings:      ENDOSCOPIC  FINDING:The examined esophagus was endoscopically normal. The entire examined stomach was endoscopically normal. The examined duodenum was endoscopically normal. A small amount of food (residue) was  found in the gastric antrum. An acquired malignant-appearing, intrinsic severe stenosis was found in the first portion of the duodenum and was non-traversed.      ENDOSONOGRAPHIC FINDING:The esophagus stomach and duodenum and adjacent structures were visualized endosonographically. A mass was identified in the pancreatic head. There was dilation in the common hepatic duct which measured up to 15 mm. The decision was made to make a bile duct  puncture into the common bile duct from the duodenal bulb. Under EUS guidance a 19guage needle was used to puncture into the  Common hepatic duct. 20cc's of dark bile was aspirated. A cholangiogram was performed through the 19 g needle,       A guidewire was advanced through the EUS needle and into the intrahepatic biliary tree. A cautery-tipped, lumen-apposing stent delivery system was advanced across the transduodenal puncture site and into the biliary system using cautery. A 10x 10mm lumen-apposing stent (Axios) was placed into the biliary tree, with one end in the extrahepatic duct and the other in the duodenal. Good drainage of bile and contrast was noted post stent placement. The wire remained in place and a 5fr 5cm double pigtail stent was placed across the LAMSthe stent was seen to pigtail int he common hepatic duct and in the duodenal bulb.       After succesful biliary drainage, the duodenal stricture was stented  with a 22 mm x 12 cm WallFlex stent under fluoroscopic guidance.    Impression:           Succesful  palliation of malignant biliary and gastric obstruction with choledochoduodenstomy and duodenal stent placement           2/8/19 PET CT             COMPARISON:  CT chest abdomen pelvis with contrast 01/21/2019    FINDINGS:  Quality of the study: Adequate.    There is redemonstration of an ill-defined pancreatic head mass which demonstrates hypermetabolic activity (SUV max 5.78).  This mass measures at least 4.7 x 4.3 cm with distal pancreatic duct dilation.  When compared to CT examination dated 01/21/2019, there has been interval placement of a duodenal stent as well as choledochojejunostomy with stent placement.  There is moderate pneumobilia throughout both left and right hepatic lobes.    There is a small focus of hypermetabolic soft tissue nodularity along the proximal sigmoid colon best appreciated on axial CT image 205.  This finding may relate to physiologic bowel activity, however per chart review this patient underwent complicated colonoscopy and inflammation of the site of complication may present similarly.  Additionally, neoplasia including 2nd synchronous primary or metastatic disease cannot be entirely excluded.    Index lesions:    *Pancreatic head mass: SUV max 5.78 (axial fused CT image 140).  *Sigmoid colon: SUV max 9.72 (axial fused CT image 205).  Physiologic uptake of the tracer is present within the brain, salivary glands, myocardium, GI and  tracts.    Incidental CT findings: Noncontrast CT demonstrates no acute abnormality.       Impression         Hypermetabolic pancreatic head mass consistent with patient's known pancreatic adenocarcinoma.    Focal, hypermetabolic soft tissue thickening along the proximal sigmoid colon may relate to physiologic bowel activity or inflammation from recent colonoscopy complication, however neoplasia including 2nd synchronous primary or metastatic disease cannot be entirely excluded.         Component      Latest Ref Rng & Units 3/26/2019   WBC      3.90 -  12.70 K/uL 7.82   RBC      4.00 - 5.40 M/uL 3.47 (L)   Hemoglobin      12.0 - 16.0 g/dL 9.8 (L)   Hematocrit      37.0 - 48.5 % 30.8 (L)   MCV      82 - 98 fL 89   MCH      27.0 - 31.0 pg 28.2   MCHC      32.0 - 36.0 g/dL 31.8 (L)   RDW      11.5 - 14.5 % 13.9   Platelets      150 - 350 K/uL 126 (L)   MPV      9.2 - 12.9 fL 11.6   Immature Granulocytes      0.0 - 0.5 % 0.5   Gran # (ANC)      1.8 - 7.7 K/uL 6.3   Immature Grans (Abs)      0.00 - 0.04 K/uL 0.04   Lymph #      1.0 - 4.8 K/uL 0.8 (L)   Mono #      0.3 - 1.0 K/uL 0.5   Eos #      0.0 - 0.5 K/uL 0.1   Baso #      0.00 - 0.20 K/uL 0.06   nRBC      0 /100 WBC 0   Gran%      38.0 - 73.0 % 80.7 (H)   Lymph%      18.0 - 48.0 % 9.7 (L)   Mono%      4.0 - 15.0 % 6.6   Eosinophil%      0.0 - 8.0 % 1.7   Basophil%      0.0 - 1.9 % 0.8   Differential Method       Automated          Component      Latest Ref Rng & Units 3/26/2019   Sodium      136 - 145 mmol/L 138   Potassium      3.5 - 5.1 mmol/L 3.3 (L)   Chloride      95 - 110 mmol/L 106   CO2      23 - 29 mmol/L 20 (L)   Glucose      70 - 110 mg/dL 175 (H)   BUN, Bld      8 - 23 mg/dL 33 (H)   Creatinine      0.5 - 1.4 mg/dL 1.7 (H)   Calcium      8.7 - 10.5 mg/dL 8.1 (L)   Total Protein      6.0 - 8.4 g/dL 5.7 (L)   Albumin      3.5 - 5.2 g/dL 2.3 (L)   Total Bilirubin      0.1 - 1.0 mg/dL 1.0   Alkaline Phosphatase      55 - 135 U/L 152 (H)   AST      10 - 40 U/L 38   ALT      10 - 44 U/L 23   Anion Gap      8 - 16 mmol/L 12   eGFR if African American      >60 mL/min/1.73 m:2 35.7 (A)   eGFR if non African American      >60 mL/min/1.73 m:2 31.0 (A)   Magnesium      1.6 - 2.6 mg/dL 1.0 (L)     Assessment:     1. Adenocarcinoma of head of pancreas  2. Malignant stricture of duodenum  3. Biliary obstruction  4. Jaundice  5. Weight loss  6. Anemia secondary to chemotherapy  7. Elevated CA 19-9  8. Anorexia  9. Nausea  10. Cancer associated pain  11. Hyperglycemia  12. Thrombocytopenia  13. Hypomagnesemia  14.  SHANNA        Plan:        1,2,3,4: She is s/p biliary and duodenal stents. She is still very jaundiced. Her pancreatic cancer appears to be borderline resectable ( > 180 degree encasement of SMV). No apparent arterial involvement. Case was discussed at upper GI conference this week.   She has been evaluated by surgical oncology. No evidence of metastatic disease on PET CT on 2/8/19. I discussed siddhartha adjuvant chemotherapy with FOLFIRINOX in detail and explained that it is indicated at this time to debulk the tumor and possibly proceed to surgery eventually. She started C1 FOLFIRINOX without 5FU bolus, on 2/26/19.  She had severe emesis, nasuea, diarrhea for several days, well into the 2nd week after her initial chemotherapy.         5.8: Secondary to #1. She was evaluated by nutrition here. She is on Marinol. I emphasized the need to maintain calorie count, and maintain adequate calorie intake. She cannot tolerate ENSURE/BOOST. She is looking for lactose free versions.       6. Hgb 9.8 today.      9. On Zofran PRN. She also takes Phenergan.      10. On Morphine extended release and IR PRN      11. Possibly secondary to pancreatic insufficiency. No known diagnosis of DM previously.     12. Mild, asymptomatic, secondary to chemotherapy    13. She will receive iv Magnesium    14. Cr 1.7 today. She will  Receive iv fludis                  Distress Screening Results: Psychosocial Distress screening score of Distress Score: 5 noted and reviewed. No intervention indicated.

## 2019-03-26 NOTE — NURSING
Patient here for blood draw from right chest port-accesses easily jennifer good blood return-blood to lab-line flushed and left accessed for treatment today-tolerated well.

## 2019-03-27 NOTE — TELEPHONE ENCOUNTER
Daughter called to report the following:     -chemo about 45 min ago   -has dry mouth,  tongue getting in the way  -pt speaking clear, aaox4  -urinating fine  -denies difficulty breathing, cp, fever   -advised on home care for dry mouth and when to call back     Reason for Disposition   [1] Tingling in hand (e.g., pins and needles) AND [2] after prolonged laying on arm AND [3]  brief (now gone)    Protocols used: [facility] NEUROLOGIC DEFICIT-A-AH

## 2019-03-28 ENCOUNTER — INFUSION (OUTPATIENT)
Dept: INFUSION THERAPY | Facility: HOSPITAL | Age: 69
End: 2019-03-28
Attending: INTERNAL MEDICINE
Payer: COMMERCIAL

## 2019-03-28 VITALS
TEMPERATURE: 99 F | SYSTOLIC BLOOD PRESSURE: 140 MMHG | HEART RATE: 80 BPM | OXYGEN SATURATION: 99 % | RESPIRATION RATE: 18 BRPM | DIASTOLIC BLOOD PRESSURE: 78 MMHG

## 2019-03-28 DIAGNOSIS — C25.9 PANCREATIC ADENOCARCINOMA: Primary | ICD-10-CM

## 2019-03-28 PROCEDURE — 96523 IRRIG DRUG DELIVERY DEVICE: CPT

## 2019-03-28 PROCEDURE — 96377 APPLICATON ON-BODY INJECTOR: CPT

## 2019-03-28 PROCEDURE — 25000003 PHARM REV CODE 250: Performed by: INTERNAL MEDICINE

## 2019-03-28 PROCEDURE — 63600175 PHARM REV CODE 636 W HCPCS: Performed by: INTERNAL MEDICINE

## 2019-03-28 PROCEDURE — 96360 HYDRATION IV INFUSION INIT: CPT

## 2019-03-28 RX ORDER — HEPARIN 100 UNIT/ML
500 SYRINGE INTRAVENOUS
Status: DISCONTINUED | OUTPATIENT
Start: 2019-03-28 | End: 2019-03-28 | Stop reason: HOSPADM

## 2019-03-28 RX ORDER — SODIUM CHLORIDE 0.9 % (FLUSH) 0.9 %
10 SYRINGE (ML) INJECTION
Status: DISCONTINUED | OUTPATIENT
Start: 2019-03-28 | End: 2019-03-28 | Stop reason: HOSPADM

## 2019-03-28 RX ADMIN — SODIUM CHLORIDE 500 ML: 0.9 INJECTION, SOLUTION INTRAVENOUS at 03:03

## 2019-03-28 RX ADMIN — HEPARIN 500 UNITS: 100 SYRINGE at 04:03

## 2019-03-28 RX ADMIN — PEGFILGRASTIM 6 MG: KIT SUBCUTANEOUS at 04:03

## 2019-03-28 NOTE — PLAN OF CARE
Problem: Adult Inpatient Plan of Care  Goal: Plan of Care Review  Outcome: Ongoing (interventions implemented as appropriate)  Pt admitted for IVF, Neulasta OBI and Pump DC. Side effects and self care tips  Discussed. Pt received treatment and Plan of care reviewed and Pt instructed to contact MD with any further concerns or questions, she  verbalized understanding.Pt discharged in W/C @ 16:20

## 2019-04-05 ENCOUNTER — PATIENT MESSAGE (OUTPATIENT)
Dept: HEMATOLOGY/ONCOLOGY | Facility: CLINIC | Age: 69
End: 2019-04-05

## 2019-04-05 NOTE — TELEPHONE ENCOUNTER
Spoke to patient by phone to coordinate follow up appt for chemotherapy due Tuesday 4.9.19 .  Scheduled with Dr duarte on Monday with labs and chemotherapy scheduled at 8am Tuesday. Patient aware of all schedules

## 2019-04-06 DIAGNOSIS — R11.2 NAUSEA AND VOMITING, INTRACTABILITY OF VOMITING NOT SPECIFIED, UNSPECIFIED VOMITING TYPE: ICD-10-CM

## 2019-04-08 ENCOUNTER — OFFICE VISIT (OUTPATIENT)
Dept: HEMATOLOGY/ONCOLOGY | Facility: CLINIC | Age: 69
End: 2019-04-08
Payer: COMMERCIAL

## 2019-04-08 ENCOUNTER — INFUSION (OUTPATIENT)
Dept: INFUSION THERAPY | Facility: HOSPITAL | Age: 69
End: 2019-04-08
Attending: INTERNAL MEDICINE
Payer: COMMERCIAL

## 2019-04-08 VITALS
OXYGEN SATURATION: 98 % | HEART RATE: 90 BPM | WEIGHT: 150.56 LBS | DIASTOLIC BLOOD PRESSURE: 64 MMHG | HEIGHT: 63 IN | BODY MASS INDEX: 26.68 KG/M2 | TEMPERATURE: 99 F | SYSTOLIC BLOOD PRESSURE: 108 MMHG

## 2019-04-08 VITALS
HEART RATE: 77 BPM | DIASTOLIC BLOOD PRESSURE: 69 MMHG | RESPIRATION RATE: 18 BRPM | TEMPERATURE: 98 F | SYSTOLIC BLOOD PRESSURE: 106 MMHG

## 2019-04-08 DIAGNOSIS — I82.412 ACUTE DEEP VEIN THROMBOSIS (DVT) OF FEMORAL VEIN OF LEFT LOWER EXTREMITY: ICD-10-CM

## 2019-04-08 DIAGNOSIS — C25.9 MALIGNANT NEOPLASM OF PANCREAS, UNSPECIFIED LOCATION OF MALIGNANCY: ICD-10-CM

## 2019-04-08 DIAGNOSIS — D63.0 ANEMIA IN NEOPLASTIC DISEASE: ICD-10-CM

## 2019-04-08 DIAGNOSIS — C25.9 PANCREATIC ADENOCARCINOMA: Primary | ICD-10-CM

## 2019-04-08 DIAGNOSIS — R63.0 ANOREXIA: ICD-10-CM

## 2019-04-08 DIAGNOSIS — E83.42 HYPOMAGNESEMIA: ICD-10-CM

## 2019-04-08 DIAGNOSIS — C25.9 PANCREATIC ADENOCARCINOMA: ICD-10-CM

## 2019-04-08 DIAGNOSIS — R11.10: Primary | ICD-10-CM

## 2019-04-08 DIAGNOSIS — R53.0 NEOPLASTIC MALIGNANT RELATED FATIGUE: ICD-10-CM

## 2019-04-08 DIAGNOSIS — D69.6 THROMBOCYTOPENIA: ICD-10-CM

## 2019-04-08 DIAGNOSIS — Z98.890: Primary | ICD-10-CM

## 2019-04-08 DIAGNOSIS — R19.7 DIARRHEA, UNSPECIFIED TYPE: ICD-10-CM

## 2019-04-08 LAB
ALBUMIN SERPL BCP-MCNC: 2.3 G/DL (ref 3.5–5.2)
ALP SERPL-CCNC: 185 U/L (ref 55–135)
ALT SERPL W/O P-5'-P-CCNC: 24 U/L (ref 10–44)
ANION GAP SERPL CALC-SCNC: 8 MMOL/L (ref 8–16)
AST SERPL-CCNC: 41 U/L (ref 10–40)
BASOPHILS # BLD AUTO: 0.06 K/UL (ref 0–0.2)
BASOPHILS NFR BLD: 0.5 % (ref 0–1.9)
BILIRUB SERPL-MCNC: 0.8 MG/DL (ref 0.1–1)
BUN SERPL-MCNC: 29 MG/DL (ref 8–23)
CALCIUM SERPL-MCNC: 8.3 MG/DL (ref 8.7–10.5)
CHLORIDE SERPL-SCNC: 103 MMOL/L (ref 95–110)
CO2 SERPL-SCNC: 25 MMOL/L (ref 23–29)
CREAT SERPL-MCNC: 2.5 MG/DL (ref 0.5–1.4)
DIFFERENTIAL METHOD: ABNORMAL
DOHLE BOD BLD QL SMEAR: PRESENT
EOSINOPHIL # BLD AUTO: 0.1 K/UL (ref 0–0.5)
EOSINOPHIL NFR BLD: 0.4 % (ref 0–8)
ERYTHROCYTE [DISTWIDTH] IN BLOOD BY AUTOMATED COUNT: 15.9 % (ref 11.5–14.5)
EST. GFR  (AFRICAN AMERICAN): 22.4 ML/MIN/1.73 M^2
EST. GFR  (NON AFRICAN AMERICAN): 19.4 ML/MIN/1.73 M^2
GLUCOSE SERPL-MCNC: 151 MG/DL (ref 70–110)
HCT VFR BLD AUTO: 29.4 % (ref 37–48.5)
HGB BLD-MCNC: 9.3 G/DL (ref 12–16)
HYPOCHROMIA BLD QL SMEAR: ABNORMAL
IMM GRANULOCYTES # BLD AUTO: 0.4 K/UL (ref 0–0.04)
IMM GRANULOCYTES NFR BLD AUTO: 3 % (ref 0–0.5)
LYMPHOCYTES # BLD AUTO: 1.1 K/UL (ref 1–4.8)
LYMPHOCYTES NFR BLD: 7.9 % (ref 18–48)
MAGNESIUM SERPL-MCNC: 1.3 MG/DL (ref 1.6–2.6)
MCH RBC QN AUTO: 27.4 PG (ref 27–31)
MCHC RBC AUTO-ENTMCNC: 31.6 G/DL (ref 32–36)
MCV RBC AUTO: 87 FL (ref 82–98)
MONOCYTES # BLD AUTO: 1.2 K/UL (ref 0.3–1)
MONOCYTES NFR BLD: 8.6 % (ref 4–15)
NEUTROPHILS # BLD AUTO: 10.6 K/UL (ref 1.8–7.7)
NEUTROPHILS NFR BLD: 79.6 % (ref 38–73)
NRBC BLD-RTO: 0 /100 WBC
PLATELET # BLD AUTO: 126 K/UL (ref 150–350)
PLATELET BLD QL SMEAR: ABNORMAL
PMV BLD AUTO: 11.7 FL (ref 9.2–12.9)
POLYCHROMASIA BLD QL SMEAR: ABNORMAL
POTASSIUM SERPL-SCNC: 4 MMOL/L (ref 3.5–5.1)
PROT SERPL-MCNC: 5.7 G/DL (ref 6–8.4)
RBC # BLD AUTO: 3.39 M/UL (ref 4–5.4)
SODIUM SERPL-SCNC: 136 MMOL/L (ref 136–145)
TOXIC GRANULES BLD QL SMEAR: PRESENT
WBC # BLD AUTO: 13.3 K/UL (ref 3.9–12.7)

## 2019-04-08 PROCEDURE — A4216 STERILE WATER/SALINE, 10 ML: HCPCS | Performed by: INTERNAL MEDICINE

## 2019-04-08 PROCEDURE — 3288F FALL RISK ASSESSMENT DOCD: CPT | Mod: CPTII,S$GLB,, | Performed by: INTERNAL MEDICINE

## 2019-04-08 PROCEDURE — 3288F PR FALLS RISK ASSESSMENT DOCUMENTED: ICD-10-PCS | Mod: CPTII,S$GLB,, | Performed by: INTERNAL MEDICINE

## 2019-04-08 PROCEDURE — 80053 COMPREHEN METABOLIC PANEL: CPT

## 2019-04-08 PROCEDURE — 1100F PR PT FALLS ASSESS DOC 2+ FALLS/FALL W/INJURY/YR: ICD-10-PCS | Mod: CPTII,S$GLB,, | Performed by: INTERNAL MEDICINE

## 2019-04-08 PROCEDURE — 3074F SYST BP LT 130 MM HG: CPT | Mod: CPTII,S$GLB,, | Performed by: INTERNAL MEDICINE

## 2019-04-08 PROCEDURE — 25000003 PHARM REV CODE 250: Performed by: INTERNAL MEDICINE

## 2019-04-08 PROCEDURE — 96360 HYDRATION IV INFUSION INIT: CPT

## 2019-04-08 PROCEDURE — 1100F PTFALLS ASSESS-DOCD GE2>/YR: CPT | Mod: CPTII,S$GLB,, | Performed by: INTERNAL MEDICINE

## 2019-04-08 PROCEDURE — 99999 PR PBB SHADOW E&M-EST. PATIENT-LVL IV: CPT | Mod: PBBFAC,,, | Performed by: INTERNAL MEDICINE

## 2019-04-08 PROCEDURE — 3078F PR MOST RECENT DIASTOLIC BLOOD PRESSURE < 80 MM HG: ICD-10-PCS | Mod: CPTII,S$GLB,, | Performed by: INTERNAL MEDICINE

## 2019-04-08 PROCEDURE — 99214 PR OFFICE/OUTPT VISIT, EST, LEVL IV, 30-39 MIN: ICD-10-PCS | Mod: S$GLB,,, | Performed by: INTERNAL MEDICINE

## 2019-04-08 PROCEDURE — 3078F DIAST BP <80 MM HG: CPT | Mod: CPTII,S$GLB,, | Performed by: INTERNAL MEDICINE

## 2019-04-08 PROCEDURE — 63600175 PHARM REV CODE 636 W HCPCS: Performed by: INTERNAL MEDICINE

## 2019-04-08 PROCEDURE — 3074F PR MOST RECENT SYSTOLIC BLOOD PRESSURE < 130 MM HG: ICD-10-PCS | Mod: CPTII,S$GLB,, | Performed by: INTERNAL MEDICINE

## 2019-04-08 PROCEDURE — 83735 ASSAY OF MAGNESIUM: CPT

## 2019-04-08 PROCEDURE — 99214 OFFICE O/P EST MOD 30 MIN: CPT | Mod: S$GLB,,, | Performed by: INTERNAL MEDICINE

## 2019-04-08 PROCEDURE — 99999 PR PBB SHADOW E&M-EST. PATIENT-LVL IV: ICD-10-PCS | Mod: PBBFAC,,, | Performed by: INTERNAL MEDICINE

## 2019-04-08 PROCEDURE — 85025 COMPLETE CBC W/AUTO DIFF WBC: CPT

## 2019-04-08 RX ORDER — HEPARIN 100 UNIT/ML
500 SYRINGE INTRAVENOUS
Status: CANCELLED | OUTPATIENT
Start: 2019-04-08

## 2019-04-08 RX ORDER — PROMETHAZINE HYDROCHLORIDE 25 MG/1
TABLET ORAL
Qty: 10 TABLET | Refills: 0 | Status: ON HOLD | OUTPATIENT
Start: 2019-04-08 | End: 2019-05-22 | Stop reason: HOSPADM

## 2019-04-08 RX ORDER — SODIUM CHLORIDE 0.9 % (FLUSH) 0.9 %
10 SYRINGE (ML) INJECTION
Status: CANCELLED | OUTPATIENT
Start: 2019-04-08

## 2019-04-08 RX ORDER — DEXAMETHASONE 2 MG/1
2 TABLET ORAL 2 TIMES DAILY WITH MEALS
Qty: 60 TABLET | Refills: 2 | Status: SHIPPED | OUTPATIENT
Start: 2019-04-08 | End: 2019-05-07 | Stop reason: SDUPTHER

## 2019-04-08 RX ORDER — SODIUM CHLORIDE 0.9 % (FLUSH) 0.9 %
10 SYRINGE (ML) INJECTION
Status: COMPLETED | OUTPATIENT
Start: 2019-04-08 | End: 2019-04-08

## 2019-04-08 RX ORDER — HEPARIN 100 UNIT/ML
500 SYRINGE INTRAVENOUS
Status: COMPLETED | OUTPATIENT
Start: 2019-04-08 | End: 2019-04-08

## 2019-04-08 RX ORDER — DIPHENOXYLATE HYDROCHLORIDE AND ATROPINE SULFATE 2.5; .025 MG/1; MG/1
1 TABLET ORAL 4 TIMES DAILY PRN
Qty: 30 TABLET | Refills: 1 | Status: SHIPPED | OUTPATIENT
Start: 2019-04-08 | End: 2019-05-07 | Stop reason: SDUPTHER

## 2019-04-08 RX ADMIN — HEPARIN 500 UNITS: 100 SYRINGE at 11:04

## 2019-04-08 RX ADMIN — SODIUM CHLORIDE: 0.9 INJECTION, SOLUTION INTRAVENOUS at 02:04

## 2019-04-08 RX ADMIN — Medication 10 ML: at 03:04

## 2019-04-08 RX ADMIN — Medication 10 ML: at 11:04

## 2019-04-08 RX ADMIN — HEPARIN 500 UNITS: 100 SYRINGE at 03:04

## 2019-04-08 NOTE — TELEPHONE ENCOUNTER
Notified of refill request approval. Explained to patient that future requests should go to prescribing doctor. Verbalized understanding.

## 2019-04-08 NOTE — Clinical Note
--iv fluids , mag today--no chemo tomorrow; changing chemo to gem-abraxane--iv fluds on Thursday am--cbc, cmp, gem abraxane chemo next wk (Thursday, 920 AM )

## 2019-04-08 NOTE — PLAN OF CARE
Problem: Adult Inpatient Plan of Care  Goal: Plan of Care Review  Outcome: Ongoing (interventions implemented as appropriate)  Pt received 1L IVFs; tolerated well. VSS and NAD. Pt to drop off stool sample to lab. Port remains accessed for appointment on Thursday 4/11. Pt instructed to call MD with any concerns. Pt discharged home independently.

## 2019-04-08 NOTE — NURSING
1200- Patient arrived on the unit in a wheelchair pushed by family.  Labs for today not scheduled through port so appointment changed for injection room.  Patients port was accessed and samples drawn.  Port flushed and heparin locked but left in place in case of need for treatment today, patient reporting ongoing nausea, vomiting, diarrhea.  Patient discharged to next appointment- will be seeing MD.

## 2019-04-08 NOTE — PROGRESS NOTES
CC: Pancreatic adenocarcinoma, follow up     Oncology History      Diagnosis: Adenocarcinoma pancreas  Stage : Borderline resectable  Treatment: 2/26/19 - 3/26/19: 2 cycles neoadjuvant FOLFIRINOX 9without bolus 5 FU)       HPI:  is a 69y/o lady with recently diagnosed pancreatic cancer. She has hypertension.  She has history of flank/ back pain for over a year. She describes this as constant, achy, progressively worsening . This had impaired her ADLs and interrupted her sleep. She has recent weight loss--15lb since Dec 2018. She has decreased appetite. She had also noticed change in stool to light color. She had colonoscopy on 1/8/2019, suffered complications. She was told she had perforated an ulcer. She was admitted to [facility]  for 9 days. CT scan done with oral contrast via NG tube r/t poor renal function. She was discharged 1/16/19.   She was told she had a pancreatic mass. She was evaluated here by . She is still not eating well.      Interval history:  She is here for a follow up. She is still not eating well. She continues to have nausea, abdominal pain. She has finished 1 cycle of FOLFIRINOX on 2/26/19. She had nausea, emesis and diarrhea after C1.C2 was on 3/26/19.She continued to have severe nausea, vomiting, diarrhea.     Review of Systems   Constitutional: Positive for malaise/fatigue and weight loss. Negative for chills and fever.   HENT: Negative for congestion, ear discharge, ear pain and nosebleeds.    Eyes: Negative for blurred vision, double vision, photophobia and pain.   Respiratory: Negative for cough, hemoptysis and sputum production.    Cardiovascular: Negative for chest pain, palpitations and orthopnea.   Gastrointestinal: Positive for abdominal pain, diarrhea and nausea. Negative for blood in stool, constipation, heartburn and vomiting.   Genitourinary: Negative for dysuria, frequency and urgency.   Musculoskeletal: Negative for back pain, myalgias and neck pain.   Skin:  Negative for rash.   Neurological: Negative for dizziness, tingling, tremors, speech change and headaches.   Endo/Heme/Allergies: Does not bruise/bleed easily.   Psychiatric/Behavioral: Negative for depression, hallucinations, substance abuse and suicidal ideas. The patient is not nervous/anxious.            Current Outpatient Medications   Medication Sig    bisoprolol-hydrochlorothiazide (ZIAC) 10-6.25 mg per tablet Take 1 tablet by mouth 2 (two) times daily.    diphenoxylate-atropine 2.5-0.025 mg (LOMOTIL) 2.5-0.025 mg per tablet Take 1 tablet by mouth 4 (four) times daily as needed for Diarrhea.    dronabinol (MARINOL) 5 MG capsule Take 1 capsule (5 mg total) by mouth 2 (two) times daily before meals.    hydrALAZINE (APRESOLINE) 50 MG tablet Take 75 mg by mouth 3 (three) times daily.    LORazepam (ATIVAN) 1 MG tablet Take 1 mg by mouth every 8 (eight) hours as needed for Anxiety.    morphine (MS CONTIN) 15 MG 12 hr tablet Take 1 tablet (15 mg total) by mouth 2 (two) times daily.    morphine (MSIR) 15 MG tablet Take 2 tablets (30 mg total) by mouth every 4 (four) hours as needed for Pain.    ondansetron (ZOFRAN) 8 MG tablet Take 1 tablet (8 mg total) by mouth every 8 (eight) hours as needed for Nausea.    pantoprazole (PROTONIX) 40 MG tablet Take 40 mg by mouth once daily.    potassium chloride SA (K-DUR,KLOR-CON) 20 MEQ tablet Take 2 tablets (40 mEq total) by mouth 2 (two) times daily.    promethazine (PHENERGAN) 25 MG tablet TAKE 1 TABLET(25 MG) BY MOUTH EVERY 6 HOURS AS NEEDED FOR NAUSEA     Current Facility-Administered Medications   Medication    potassium chloride CR tablet 40 mEq       Vitals:    04/08/19 1242   BP: 108/64   Pulse: 90   Temp: 98.9 °F (37.2 °C)       Physical Exam   Constitutional: She is oriented to person, place, and time. She appears well-developed.   HENT:   Head: Normocephalic and atraumatic.   Mouth/Throat: No oropharyngeal exudate.   Eyes: Pupils are equal, round, and  reactive to light. No scleral icterus.   Neck: Normal range of motion.   Cardiovascular: Normal rate and regular rhythm.   Pulmonary/Chest: Effort normal and breath sounds normal. No respiratory distress. She has no wheezes. She has no rales.   Abdominal: Soft. There is no tenderness.   Musculoskeletal: She exhibits no edema.   She has left LE calf tenderness   Lymphadenopathy:     She has no cervical adenopathy.   Neurological: She is alert and oriented to person, place, and time. No cranial nerve deficit.   Skin: Skin is warm.   Psychiatric: She has a normal mood and affect.       1/21/19 CT chest, abdomen and pelvis with cont             FINDINGS:  The soft tissues at the base of the neck are unremarkable.  The thyroid gland is normal in size and configuration.  Hypodensity in the right lateral aspect of the thyroid isthmus measures 0.7 cm.  There is no abnormal lymph node enlargement.    There is no axillary, mediastinal, or hilar lymphadenopathy.  The hilar contours are unremarkable.  The esophagus is normal in course and contour.    The thoracic aorta is normal in course, caliber, and contour without significant atherosclerotic calcifications.The heart does not appear enlarged and there is no pericardial effusion.    The trachea and proximal airways are patent.  The lungs are symmetrically expanded and demonstrate no convincing evidence of consolidation, pleural thickening, pneumothorax, or pleural fluid.  Right hemidiaphragmatic elevation.    The liver is prominent in size measuring 18.1 cm in craniocaudal dimension and slightly decreased in attenuation reflecting hepatic steatosis.  There are several hypodensities in the right hepatic lobe at the periphery, largest measuring 1.4 cm, which are favored to represent cysts.  The gallbladder is distended without evidence of calcified gallstones, or wall thickening, or character pericholecystic fluid.  There is hepatic subcapsular fluid collection posterior to the  gallbladder which measures approximately 3.3 x 1.2 cm.  There is marked intra and extrahepatic biliary ductal dilatation with the common bile duct measuring up to 1.6 cm in transverse dimension.    There is an ill-defined hypodense solid lesion in the head of the pancreas measuring 4.3 x 4.5 x 4.4 cm (AP x TV x CC; axial series 3, image 114 and coronal series 601, image 52) with associated pancreatic ductal dilatation with the pancreatic duct measuring up to 1.1 cm.  Assessment of the portal vein, splenic vein, SMV demonstrates pancreatic mass surrounding the superior mesenteric vein (greater than 180 degrees), also vessel contour producing concentric narrowing of the lumen consistent with vascular encasement.  The IVC and celiac and superior mesenteric arteries are not involved.    The stomach, spleen, pancreas, and adrenal glands are unremarkable.    The kidneys are normal in size and location.  Bilateral renal hypodensities too small to fully characterize, though may represent cysts.  There is no evidence of hydronephrosis.  The ureters appear normal in course and caliber without evidence of ureteral dilatation. The urinary bladder demonstrates no significant abnormality. Operative change of prior hysterectomy with small approximately 2 cm cystic lesion in the left adnexa likely an ovarian cyst.    The abdominal aorta is normal in course and caliber without significant atherosclerotic calcifications.    The visualized loops of small bowel show no evidence of obstruction or inflammation. Large bowel is unremarkable.  High density material in the appendix, likely an appendicolith.  There is no intraperitoneal free air.  Mild pelvic ascites.  There is no evidence of lymph node enlargement in the abdomen or pelvis.    When viewed with bone windows the osseous structures are unremarkable.  Fat containing umbilical hernia.       Impression         Ill-defined hypodense solid lesion of the head the pancreas concerning  for pancreatic adenocarcinoma with associated intrahepatic and extrahepatic biliary ductal dilatation, pancreatic ductal dilatation and associated encasement of the superior mesenteric vein as detailed above.  There are multiple normal sized regional lymph nodes.    Left adnexal hypodensity, likely a cyst.  This can be followed with routine oncologic surveillance.    Additional findings of hypodense right thyroid nodule, hepatomegaly with hepatic steatosis, right hepatic cysts, bilateral renal cysts, and umbilical hernia.    RECIST SUMMARY:    Date of prior examination for comparison: Baseline study    Lesion 1: Pancreatic head mass.  4.5 cm. Series 2 image 138.  New lesion.                  1/25/19 EGD/EUS           ENDOSCOPIC FINDING: :       There is no endoscopic evidence of bezoar/food (residue) or fluid collections in the entire examined stomach (stomach appears to be  emptying).       An acquired extrinsic severe stenosis was found in the first portion of the duodenum and was traversed (with the EGD scope, but could not  be traversed with the EUS scope).       ENDOSONOGRAPHIC FINDING: :       The esophagus, stomach and duodenum and adjacent structures were visualized endosonographically. Endosonographic imaging in the visualized portion of the liver showed no lesion.       A mass was identified in the pancreatic head. The mass was  hypoechoic. The mass measured 41 mm by 35 mm in maximal  cross-sectional diameter. The outer margins were irregular. There  was sonographic evidence suggesting invasion into the superior        mesenteric vein (manifested by encasement) and peripancreatic fat. The remainder of the pancreas was examined. The endosonographic appearance of parenchyma and the upstream pancreatic duct indicated        duct dilation, a tortuous/ectatic duct and parenchymal atrophy.    Impression:           - Acquired duodenal stenosis.                        - A mass was identified in the pancreatic  head. This was staged T3 Nx Mx by endosonographic  criteria. The staging applies if malignancy is confirmed.                        - No specimens collected.  Recommendation:       - Discharge patient to home (ambulatory).                        - Resume previous diet; Discharge to home                         (ambulatory); Resume outpatient medications        2/1/19 ERCP        Findings:      ENDOSCOPIC  FINDING:The examined esophagus was endoscopically normal. The entire examined stomach was endoscopically normal. The examined duodenum was endoscopically normal. A small amount of food (residue) was  found in the gastric antrum. An acquired malignant-appearing, intrinsic severe stenosis was found in the first portion of the duodenum and was non-traversed.      ENDOSONOGRAPHIC FINDING:The esophagus stomach and duodenum and adjacent structures were visualized endosonographically. A mass was identified in the pancreatic head. There was dilation in the common hepatic duct which measured up to 15 mm. The decision was made to make a bile duct  puncture into the common bile duct from the duodenal bulb. Under EUS guidance a 19guage needle was used to puncture into the  Common hepatic duct. 20cc's of dark bile was aspirated. A cholangiogram was performed through the 19 g needle,       A guidewire was advanced through the EUS needle and into the intrahepatic biliary tree. A cautery-tipped, lumen-apposing stent delivery system was advanced across the transduodenal puncture site and into the biliary system using cautery. A 10x 10mm lumen-apposing stent ([employer]) was placed into the biliary tree, with one end in the extrahepatic duct and the other in the duodenal. Good drainage of bile and contrast was noted post stent placement. The wire remained in place and a 5fr 5cm double pigtail stent was placed across the LAMSthe stent was seen to pigtail int he common hepatic duct and in the duodenal bulb.       After succesful biliary  drainage, the duodenal stricture was stented  with a 22 mm x 12 cm WallFlex stent under fluoroscopic guidance.    Impression:           Succesful palliation of malignant biliary and gastric obstruction with choledochoduodenstomy and duodenal stent placement           2/8/19 PET CT             COMPARISON:  CT chest abdomen pelvis with contrast 01/21/2019    FINDINGS:  Quality of the study: Adequate.    There is redemonstration of an ill-defined pancreatic head mass which demonstrates hypermetabolic activity (SUV max 5.78).  This mass measures at least 4.7 x 4.3 cm with distal pancreatic duct dilation.  When compared to CT examination dated 01/21/2019, there has been interval placement of a duodenal stent as well as choledochojejunostomy with stent placement.  There is moderate pneumobilia throughout both left and right hepatic lobes.    There is a small focus of hypermetabolic soft tissue nodularity along the proximal sigmoid colon best appreciated on axial CT image 205.  This finding may relate to physiologic bowel activity, however per chart review this patient underwent complicated colonoscopy and inflammation of the site of complication may present similarly.  Additionally, neoplasia including 2nd synchronous primary or metastatic disease cannot be entirely excluded.    Index lesions:    *Pancreatic head mass: SUV max 5.78 (axial fused CT image 140).  *Sigmoid colon: SUV max 9.72 (axial fused CT image 205).  Physiologic uptake of the tracer is present within the brain, salivary glands, myocardium, GI and  tracts.    Incidental CT findings: Noncontrast CT demonstrates no acute abnormality.       Impression         Hypermetabolic pancreatic head mass consistent with patient's known pancreatic adenocarcinoma.    Focal, hypermetabolic soft tissue thickening along the proximal sigmoid colon may relate to physiologic bowel activity or inflammation from recent colonoscopy complication, however neoplasia including 2nd  synchronous primary or metastatic disease cannot be entirely excluded.         Component      Latest Ref Rng & Units 4/8/2019   WBC      3.90 - 12.70 K/uL 13.30 (H)   RBC      4.00 - 5.40 M/uL 3.39 (L)   Hemoglobin      12.0 - 16.0 g/dL 9.3 (L)   Hematocrit      37.0 - 48.5 % 29.4 (L)   MCV      82 - 98 fL 87   MCH      27.0 - 31.0 pg 27.4   MCHC      32.0 - 36.0 g/dL 31.6 (L)   RDW      11.5 - 14.5 % 15.9 (H)   Platelets      150 - 350 K/uL 126 (L)   MPV      9.2 - 12.9 fL 11.7   Immature Granulocytes      0.0 - 0.5 % 3.0 (H)   Gran # (ANC)      1.8 - 7.7 K/uL 10.6 (H)   Immature Grans (Abs)      0.00 - 0.04 K/uL 0.40 (H)   Lymph #      1.0 - 4.8 K/uL 1.1   Mono #      0.3 - 1.0 K/uL 1.2 (H)   Eos #      0.0 - 0.5 K/uL 0.1   Baso #      0.00 - 0.20 K/uL 0.06   nRBC      0 /100 WBC 0   Gran%      38.0 - 73.0 % 79.6 (H)   Lymph%      18.0 - 48.0 % 7.9 (L)   Mono%      4.0 - 15.0 % 8.6   Eosinophil%      0.0 - 8.0 % 0.4   Basophil%      0.0 - 1.9 % 0.5   Differential Method       Automated   Sodium      136 - 145 mmol/L 136   Potassium      3.5 - 5.1 mmol/L 4.0   Chloride      95 - 110 mmol/L 103   CO2      23 - 29 mmol/L 25   Glucose      70 - 110 mg/dL 151 (H)   BUN, Bld      8 - 23 mg/dL 29 (H)   Creatinine      0.5 - 1.4 mg/dL 2.5 (H)   Calcium      8.7 - 10.5 mg/dL 8.3 (L)   Total Protein      6.0 - 8.4 g/dL 5.7 (L)   Albumin      3.5 - 5.2 g/dL 2.3 (L)   Total Bilirubin      0.1 - 1.0 mg/dL 0.8   Alkaline Phosphatase      55 - 135 U/L 185 (H)   AST      10 - 40 U/L 41 (H)   ALT      10 - 44 U/L 24   Anion Gap      8 - 16 mmol/L 8   eGFR if African American      >60 mL/min/1.73 m:2 22.4 (A)   eGFR if non African American      >60 mL/min/1.73 m:2 19.4 (A)   Magnesium      1.6 - 2.6 mg/dL 1.3 (L)       Assessment:     1. Adenocarcinoma of head of pancreas  2. Malignant stricture of duodenum  3. Biliary obstruction  4. Jaundice  5. Weight loss  6. Anemia secondary to chemotherapy  7. Elevated CA 19-9  8.  Anorexia  9. Nausea  10. Cancer associated pain  11. Hyperglycemia  12. Thrombocytopenia  13. Hypomagnesemia  14. SHANNA  15. Leukocytosis  16. Diarrhea secondary to chemotherapy        Plan:        1,2,3,4: She is s/p biliary and duodenal stents. Her pancreatic cancer appears to be borderline resectable ( > 180 degree encasement of SMV). No apparent arterial involvement. Case was discussed at upper GI conference.    She has been evaluated by surgical oncology. No evidence of metastatic disease on PET CT on 2/8/19. I discussed siddhartha adjuvant chemotherapy with FOLFIRINOX in detail and explained that it is indicated at this time to debulk the tumor and possibly proceed to surgery eventually. She started C1 FOLFIRINOX without 5FU bolus, on 2/26/19.  She had severe emesis, nasuea, diarrhea for several days, well into the 2nd week after her initial chemotherapy.   She had similar problems after C2 FOLFIRINOX. She has tolerated the treatments poorly, despite anti-diarrheal, anti-emetic and other supportive care measures. She has lost more weight and has been eating poorly.  She will stop FOLFIRINOX and switch to gemcitabine-nab Paclitaxel.   Risks and benefits were explained, in detail consent obtained.     Risks of gemcitabine therapy include, but not limited to :    >10%:  Cardiovascular: Peripheral edema (20%), edema (13%)  Central nervous system: Drowsiness (11%)  Dermatologic: Skin rash (30%), alopecia (15%)  Gastrointestinal: Nausea and vomiting (69%), diarrhea (19%), stomatitis (11%)  Genitourinary: Proteinuria (45%), hematuria (35%)  Hematologic & oncologic: Anemia (68%; grade 3: 7%; grade 4: 1%), neutropenia (63%; grade 3: 19%; grade 4: 6%), thrombocytopenia (24%; grade 3: 4%; grade 4: 1%), hemorrhage (17%; grade 3: <1%; grade 4: <1%)  Hepatic: Increased serum ALT (68%; grade 3: 8%, grade 4: 2%), increased serum AST (67%; grade 3: 6%; grade 4: 2%), increased serum alkaline phosphatase (55%; grade 3: 7%; grade 4: 2%),  increased serum bilirubin (13%; grade 3: 2%, grade 4: <1%)  Infection: Infection (16%)  Renal: Increased blood urea nitrogen (16%)  Respiratory: Dyspnea (23%; grade 3: 3%; grade 4: <1%), flu-like symptoms (19%)  Miscellaneous: Fever (41%)    1% to 10%:  Central nervous system: Paresthesia (10%; grade 3: <1%)  Local: Injection site reaction (4%)  Renal: Increased serum creatinine (8%)  Respiratory: Bronchospasm (<2%)         Risks of Abraxane include:      >10%:  Cardiovascular: Flushing (28%), ECG abnormality (14% to 23%), edema (21%), hypotension (4% to 12%)  Central nervous system: Peripheral neuropathy (42% to 70%; grades 3/4: ?7%)  Dermatologic: Alopecia (87%), skin rash (12%)  Gastrointestinal: Nausea (?52%), vomiting (?52%), diarrhea (38%), mucositis (17% to 35%), stomatitis (15%; most common at doses >390 mg/m2), abdominal pain (with intraperitoneal administration)  Hematologic & oncologic: Neutropenia (78% to 98%; grade 4: 14% to 75%; onset: 8 to 10 days; median papo: 11 days; recovery: 15 to 21 days), leukopenia (90%; grade 4: 17%), anemia (47% to 90%; grades 3/4: 2% to 16%), thrombocytopenia (4% to 20%; grades 3/4: 1% to 7%), hemorrhage (14%)  Hepatic: Increased serum alkaline phosphatase (22%), increased serum AST (19%)  Hypersensitivity: Hypersensitivity reaction (31% to 45%; grades 3/4: ?2%)  Infection: Infection (15% to 30%)  Local: Injection site reaction (erythema at injection site, skin discoloration at injection site, swelling at injection site, tenderness at injection site: 13%)  Neuromuscular & skeletal: Arthralgia (?60%), myalgia (?60%), weakness (17%)  Renal: Increased serum creatinine (observed in Kaposi sarcoma patients only: 18% to 34%, severe: 5% to 7%)    1% to 10%:  Cardiovascular: Bradycardia (3%), tachycardia (2%), hypertension (1%), cardiac arrhythmia (1%), syncope (1%), venous thrombosis (1%)  Dermatologic: Changes in nails (2%)  Hematologic & oncologic: Febrile neutropenia  (2%)  Hepatic: Increased serum bilirubin (7%)  Respiratory: Dyspnea (2%)         5.8: Secondary to #1. She was evaluated by nutrition here. She is on Marinol. I emphasized the need to maintain calorie count, and maintain adequate calorie intake. She cannot tolerate ENSURE/BOOST. She is looking for lactose free versions.       6. Hgb 9.3 today.      9. On Zofran PRN. She also takes Phenergan. She will also start Decadron 2mg BID from 4/8/19.      10. On Morphine extended release and IR PRN      11. Possibly secondary to pancreatic insufficiency. No known diagnosis of DM previously.     12. Mild, asymptomatic, secondary to chemotherapy     13. She will receive iv Magnesium     14. Cr 2.5 today. Secondary to dehydration. She will  Receive iv fluids    15. She is afebrile. She has mostly neutrophilia. Likely to be from neulasta     16. She is on Imodium, PRN Lomotil.She will have C diff checked                     Distress Screening Results: Psychosocial Distress screening score of Distress Score: 8 noted and reviewed. No intervention indicated.

## 2019-04-09 ENCOUNTER — PATIENT MESSAGE (OUTPATIENT)
Dept: HEMATOLOGY/ONCOLOGY | Facility: CLINIC | Age: 69
End: 2019-04-09

## 2019-04-10 ENCOUNTER — PATIENT MESSAGE (OUTPATIENT)
Dept: HEMATOLOGY/ONCOLOGY | Facility: CLINIC | Age: 69
End: 2019-04-10

## 2019-04-10 ENCOUNTER — HOSPITAL ENCOUNTER (OUTPATIENT)
Dept: VASCULAR SURGERY | Facility: CLINIC | Age: 69
Discharge: HOME | End: 2019-04-10
Attending: INTERNAL MEDICINE
Payer: COMMERCIAL

## 2019-04-10 DIAGNOSIS — C25.9 PANCREATIC ADENOCARCINOMA: ICD-10-CM

## 2019-04-10 DIAGNOSIS — M79.605 PAIN OF LEFT LOWER EXTREMITY: ICD-10-CM

## 2019-04-10 DIAGNOSIS — I82.412 ACUTE DEEP VEIN THROMBOSIS (DVT) OF FEMORAL VEIN OF LEFT LOWER EXTREMITY: Primary | ICD-10-CM

## 2019-04-10 PROCEDURE — 93971 PR US DUPLEX, UPPER OR LOWER EXT VENOUS,UNILAT OR LTD: ICD-10-PCS | Mod: S$GLB,,, | Performed by: SURGERY

## 2019-04-10 PROCEDURE — 93971 EXTREMITY STUDY: CPT | Mod: S$GLB,,, | Performed by: SURGERY

## 2019-04-11 ENCOUNTER — INFUSION (OUTPATIENT)
Dept: INFUSION THERAPY | Facility: HOSPITAL | Age: 69
End: 2019-04-11
Attending: INTERNAL MEDICINE
Payer: COMMERCIAL

## 2019-04-11 VITALS
DIASTOLIC BLOOD PRESSURE: 62 MMHG | HEART RATE: 74 BPM | BODY MASS INDEX: 26.58 KG/M2 | WEIGHT: 150 LBS | RESPIRATION RATE: 18 BRPM | HEIGHT: 63 IN | TEMPERATURE: 98 F | SYSTOLIC BLOOD PRESSURE: 130 MMHG

## 2019-04-11 DIAGNOSIS — C25.9 PANCREATIC ADENOCARCINOMA: Primary | ICD-10-CM

## 2019-04-11 PROCEDURE — 25000003 PHARM REV CODE 250: Performed by: INTERNAL MEDICINE

## 2019-04-11 PROCEDURE — 63600175 PHARM REV CODE 636 W HCPCS: Performed by: INTERNAL MEDICINE

## 2019-04-11 PROCEDURE — 96360 HYDRATION IV INFUSION INIT: CPT

## 2019-04-11 RX ORDER — HEPARIN 100 UNIT/ML
500 SYRINGE INTRAVENOUS
Status: COMPLETED | OUTPATIENT
Start: 2019-04-11 | End: 2019-04-11

## 2019-04-11 RX ADMIN — SODIUM CHLORIDE 1000 ML: 0.9 INJECTION, SOLUTION INTRAVENOUS at 03:04

## 2019-04-11 RX ADMIN — HEPARIN 500 UNITS: 100 SYRINGE at 04:04

## 2019-04-11 NOTE — PLAN OF CARE
Problem: Adult Inpatient Plan of Care  Goal: Plan of Care Review  Outcome: Ongoing (interventions implemented as appropriate)  Pt here for 1 liter ivf, hx, meds, allergies reviewed, pt with c/o nausea, pt jose swelling to lle pt states had US on 4/10/19 and shows clot awaiting Rx for blood thinner. Reclined in chair, continue to monitor

## 2019-04-11 NOTE — TELEPHONE ENCOUNTER
Spoke to daughter Erika,  Called pharmacy to make sure eliquis RX can be picked up today.  The pharmacy said it will be ready.

## 2019-04-11 NOTE — PLAN OF CARE
Problem: Adult Inpatient Plan of Care  Goal: Plan of Care Review  Outcome: Ongoing (interventions implemented as appropriate)  Pt tolerated 1 liter ivf without issue, pt has np upcoming appts scheduled at this time, no distress noted upon d/c to home

## 2019-04-11 NOTE — TELEPHONE ENCOUNTER
Spoke with patient and she is SOB and did state that this is been going on for a long time.  Made  aware.

## 2019-04-12 DIAGNOSIS — C25.9 PANCREATIC ADENOCARCINOMA: Primary | ICD-10-CM

## 2019-04-13 ENCOUNTER — HOSPITAL ENCOUNTER (OUTPATIENT)
Dept: RADIOLOGY | Facility: HOSPITAL | Age: 69
Discharge: HOME | End: 2019-04-13
Attending: INTERNAL MEDICINE
Payer: COMMERCIAL

## 2019-04-13 DIAGNOSIS — C25.9 PANCREATIC ADENOCARCINOMA: ICD-10-CM

## 2019-04-13 PROCEDURE — 71046 XR CHEST PA AND LATERAL: ICD-10-PCS | Mod: 26,,, | Performed by: RADIOLOGY

## 2019-04-13 PROCEDURE — 71046 X-RAY EXAM CHEST 2 VIEWS: CPT | Mod: 26,,, | Performed by: RADIOLOGY

## 2019-04-13 PROCEDURE — 71046 X-RAY EXAM CHEST 2 VIEWS: CPT | Mod: TC

## 2019-04-14 ENCOUNTER — PATIENT MESSAGE (OUTPATIENT)
Dept: HEMATOLOGY/ONCOLOGY | Facility: CLINIC | Age: 69
End: 2019-04-14

## 2019-04-15 DIAGNOSIS — K21.9 GASTROESOPHAGEAL REFLUX DISEASE, ESOPHAGITIS PRESENCE NOT SPECIFIED: Primary | ICD-10-CM

## 2019-04-15 DIAGNOSIS — C25.9 PANCREATIC ADENOCARCINOMA: ICD-10-CM

## 2019-04-15 RX ORDER — PANTOPRAZOLE SODIUM 40 MG/1
40 TABLET, DELAYED RELEASE ORAL DAILY
Qty: 30 TABLET | Refills: 1 | Status: ON HOLD | OUTPATIENT
Start: 2019-04-15 | End: 2019-05-22 | Stop reason: SDUPTHER

## 2019-04-16 RX ORDER — MORPHINE SULFATE 15 MG/1
30 TABLET ORAL EVERY 4 HOURS PRN
Qty: 90 TABLET | Refills: 0 | Status: SHIPPED | OUTPATIENT
Start: 2019-04-16

## 2019-04-18 ENCOUNTER — TELEPHONE (OUTPATIENT)
Dept: HEMATOLOGY/ONCOLOGY | Facility: CLINIC | Age: 69
End: 2019-04-18

## 2019-04-18 ENCOUNTER — INFUSION (OUTPATIENT)
Dept: INFUSION THERAPY | Facility: HOSPITAL | Age: 69
End: 2019-04-18
Attending: INTERNAL MEDICINE
Payer: COMMERCIAL

## 2019-04-18 VITALS
DIASTOLIC BLOOD PRESSURE: 78 MMHG | TEMPERATURE: 98 F | RESPIRATION RATE: 18 BRPM | SYSTOLIC BLOOD PRESSURE: 137 MMHG | HEART RATE: 66 BPM

## 2019-04-18 DIAGNOSIS — C25.9 PANCREATIC ADENOCARCINOMA: Primary | ICD-10-CM

## 2019-04-18 PROCEDURE — 25000003 PHARM REV CODE 250: Performed by: INTERNAL MEDICINE

## 2019-04-18 PROCEDURE — 63600175 PHARM REV CODE 636 W HCPCS: Performed by: INTERNAL MEDICINE

## 2019-04-18 PROCEDURE — 96360 HYDRATION IV INFUSION INIT: CPT

## 2019-04-18 RX ORDER — HEPARIN 100 UNIT/ML
500 SYRINGE INTRAVENOUS
Status: COMPLETED | OUTPATIENT
Start: 2019-04-18 | End: 2019-04-18

## 2019-04-18 RX ADMIN — HEPARIN 500 UNITS: 100 SYRINGE at 12:04

## 2019-04-18 RX ADMIN — SODIUM CHLORIDE: 0.9 INJECTION, SOLUTION INTRAVENOUS at 11:04

## 2019-04-18 NOTE — PLAN OF CARE
Problem: Adult Inpatient Plan of Care  Goal: Plan of Care Review  Outcome: Ongoing (interventions implemented as appropriate)  1250:  Patient tolerated 1 liter NS well, NAD noted, denies any changes.  Port flushed per protocol and de-accessed.  Contact made with ROMARIO Del Rio RN, regarding pt's desire to see Dr. Mcarthur prior to chemo on Tuesday, regarding numerous issues.  Appt set up per Annetta.  Patient released in stable condition, via wheelchair, accompanied by her daughter.

## 2019-04-19 ENCOUNTER — PATIENT MESSAGE (OUTPATIENT)
Dept: HEMATOLOGY/ONCOLOGY | Facility: CLINIC | Age: 69
End: 2019-04-19

## 2019-04-23 ENCOUNTER — INFUSION (OUTPATIENT)
Dept: INFUSION THERAPY | Facility: HOSPITAL | Age: 69
End: 2019-04-23
Attending: INTERNAL MEDICINE
Payer: COMMERCIAL

## 2019-04-23 ENCOUNTER — OFFICE VISIT (OUTPATIENT)
Dept: HEMATOLOGY/ONCOLOGY | Facility: CLINIC | Age: 69
End: 2019-04-23
Payer: COMMERCIAL

## 2019-04-23 VITALS
RESPIRATION RATE: 18 BRPM | HEART RATE: 65 BPM | DIASTOLIC BLOOD PRESSURE: 76 MMHG | TEMPERATURE: 98 F | SYSTOLIC BLOOD PRESSURE: 158 MMHG

## 2019-04-23 VITALS
TEMPERATURE: 99 F | SYSTOLIC BLOOD PRESSURE: 112 MMHG | OXYGEN SATURATION: 100 % | RESPIRATION RATE: 18 BRPM | HEART RATE: 80 BPM | WEIGHT: 143.75 LBS | BODY MASS INDEX: 25.47 KG/M2 | DIASTOLIC BLOOD PRESSURE: 75 MMHG | HEIGHT: 63 IN

## 2019-04-23 DIAGNOSIS — R63.0 ANOREXIA: ICD-10-CM

## 2019-04-23 DIAGNOSIS — R53.0 NEOPLASTIC MALIGNANT RELATED FATIGUE: ICD-10-CM

## 2019-04-23 DIAGNOSIS — G89.3 CANCER ASSOCIATED PAIN: ICD-10-CM

## 2019-04-23 DIAGNOSIS — E83.42 HYPOMAGNESEMIA: ICD-10-CM

## 2019-04-23 DIAGNOSIS — C25.9 PANCREATIC ADENOCARCINOMA: Primary | ICD-10-CM

## 2019-04-23 DIAGNOSIS — D63.0 ANEMIA IN NEOPLASTIC DISEASE: ICD-10-CM

## 2019-04-23 DIAGNOSIS — C25.9 PANCREATIC ADENOCARCINOMA: ICD-10-CM

## 2019-04-23 DIAGNOSIS — I10 ESSENTIAL HYPERTENSION: Primary | ICD-10-CM

## 2019-04-23 DIAGNOSIS — I82.412 ACUTE DEEP VEIN THROMBOSIS (DVT) OF FEMORAL VEIN OF LEFT LOWER EXTREMITY: ICD-10-CM

## 2019-04-23 DIAGNOSIS — R63.4 WEIGHT LOSS: ICD-10-CM

## 2019-04-23 LAB
ALBUMIN SERPL BCP-MCNC: 2.4 G/DL (ref 3.5–5.2)
ALP SERPL-CCNC: 183 U/L (ref 55–135)
ALT SERPL W/O P-5'-P-CCNC: 61 U/L (ref 10–44)
ANION GAP SERPL CALC-SCNC: 9 MMOL/L (ref 8–16)
AST SERPL-CCNC: 56 U/L (ref 10–40)
BASOPHILS # BLD AUTO: 0.04 K/UL (ref 0–0.2)
BASOPHILS NFR BLD: 0.4 % (ref 0–1.9)
BILIRUB SERPL-MCNC: 1.3 MG/DL (ref 0.1–1)
BUN SERPL-MCNC: 17 MG/DL (ref 8–23)
CALCIUM SERPL-MCNC: 8.1 MG/DL (ref 8.7–10.5)
CHLORIDE SERPL-SCNC: 102 MMOL/L (ref 95–110)
CO2 SERPL-SCNC: 25 MMOL/L (ref 23–29)
CREAT SERPL-MCNC: 1 MG/DL (ref 0.5–1.4)
DIFFERENTIAL METHOD: ABNORMAL
EOSINOPHIL # BLD AUTO: 0 K/UL (ref 0–0.5)
EOSINOPHIL NFR BLD: 0.4 % (ref 0–8)
ERYTHROCYTE [DISTWIDTH] IN BLOOD BY AUTOMATED COUNT: 18.8 % (ref 11.5–14.5)
EST. GFR  (AFRICAN AMERICAN): >60 ML/MIN/1.73 M^2
EST. GFR  (NON AFRICAN AMERICAN): 58.8 ML/MIN/1.73 M^2
GLUCOSE SERPL-MCNC: 223 MG/DL (ref 70–110)
HCT VFR BLD AUTO: 26.8 % (ref 37–48.5)
HGB BLD-MCNC: 8.6 G/DL (ref 12–16)
IMM GRANULOCYTES # BLD AUTO: 0.08 K/UL (ref 0–0.04)
IMM GRANULOCYTES NFR BLD AUTO: 0.8 % (ref 0–0.5)
LYMPHOCYTES # BLD AUTO: 1.4 K/UL (ref 1–4.8)
LYMPHOCYTES NFR BLD: 13.3 % (ref 18–48)
MAGNESIUM SERPL-MCNC: 1 MG/DL (ref 1.6–2.6)
MCH RBC QN AUTO: 28.1 PG (ref 27–31)
MCHC RBC AUTO-ENTMCNC: 32.1 G/DL (ref 32–36)
MCV RBC AUTO: 88 FL (ref 82–98)
MONOCYTES # BLD AUTO: 0.4 K/UL (ref 0.3–1)
MONOCYTES NFR BLD: 3.9 % (ref 4–15)
NEUTROPHILS # BLD AUTO: 8.5 K/UL (ref 1.8–7.7)
NEUTROPHILS NFR BLD: 81.2 % (ref 38–73)
NRBC BLD-RTO: 0 /100 WBC
PLATELET # BLD AUTO: 213 K/UL (ref 150–350)
PMV BLD AUTO: 11.3 FL (ref 9.2–12.9)
POTASSIUM SERPL-SCNC: 3.2 MMOL/L (ref 3.5–5.1)
PROT SERPL-MCNC: 5.9 G/DL (ref 6–8.4)
RBC # BLD AUTO: 3.06 M/UL (ref 4–5.4)
SODIUM SERPL-SCNC: 136 MMOL/L (ref 136–145)
WBC # BLD AUTO: 10.51 K/UL (ref 3.9–12.7)

## 2019-04-23 PROCEDURE — 96367 TX/PROPH/DG ADDL SEQ IV INF: CPT

## 2019-04-23 PROCEDURE — 96413 CHEMO IV INFUSION 1 HR: CPT

## 2019-04-23 PROCEDURE — 99999 PR PBB SHADOW E&M-EST. PATIENT-LVL III: ICD-10-PCS | Mod: PBBFAC,,, | Performed by: INTERNAL MEDICINE

## 2019-04-23 PROCEDURE — 83735 ASSAY OF MAGNESIUM: CPT

## 2019-04-23 PROCEDURE — 99214 OFFICE O/P EST MOD 30 MIN: CPT | Mod: S$GLB,,, | Performed by: INTERNAL MEDICINE

## 2019-04-23 PROCEDURE — 63600175 PHARM REV CODE 636 W HCPCS: Performed by: INTERNAL MEDICINE

## 2019-04-23 PROCEDURE — 85025 COMPLETE CBC W/AUTO DIFF WBC: CPT

## 2019-04-23 PROCEDURE — 3074F PR MOST RECENT SYSTOLIC BLOOD PRESSURE < 130 MM HG: ICD-10-PCS | Mod: CPTII,S$GLB,, | Performed by: INTERNAL MEDICINE

## 2019-04-23 PROCEDURE — 3078F PR MOST RECENT DIASTOLIC BLOOD PRESSURE < 80 MM HG: ICD-10-PCS | Mod: CPTII,S$GLB,, | Performed by: INTERNAL MEDICINE

## 2019-04-23 PROCEDURE — 25000003 PHARM REV CODE 250: Performed by: INTERNAL MEDICINE

## 2019-04-23 PROCEDURE — A4216 STERILE WATER/SALINE, 10 ML: HCPCS | Performed by: INTERNAL MEDICINE

## 2019-04-23 PROCEDURE — 99999 PR PBB SHADOW E&M-EST. PATIENT-LVL III: CPT | Mod: PBBFAC,,, | Performed by: INTERNAL MEDICINE

## 2019-04-23 PROCEDURE — 80053 COMPREHEN METABOLIC PANEL: CPT

## 2019-04-23 PROCEDURE — 96417 CHEMO IV INFUS EACH ADDL SEQ: CPT

## 2019-04-23 PROCEDURE — 99214 PR OFFICE/OUTPT VISIT, EST, LEVL IV, 30-39 MIN: ICD-10-PCS | Mod: S$GLB,,, | Performed by: INTERNAL MEDICINE

## 2019-04-23 PROCEDURE — 3078F DIAST BP <80 MM HG: CPT | Mod: CPTII,S$GLB,, | Performed by: INTERNAL MEDICINE

## 2019-04-23 PROCEDURE — 1101F PT FALLS ASSESS-DOCD LE1/YR: CPT | Mod: CPTII,S$GLB,, | Performed by: INTERNAL MEDICINE

## 2019-04-23 PROCEDURE — 1101F PR PT FALLS ASSESS DOC 0-1 FALLS W/OUT INJ PAST YR: ICD-10-PCS | Mod: CPTII,S$GLB,, | Performed by: INTERNAL MEDICINE

## 2019-04-23 PROCEDURE — 3074F SYST BP LT 130 MM HG: CPT | Mod: CPTII,S$GLB,, | Performed by: INTERNAL MEDICINE

## 2019-04-23 RX ORDER — LOPERAMIDE HYDROCHLORIDE 2 MG/1
2 CAPSULE ORAL 4 TIMES DAILY PRN
Status: ON HOLD | COMMUNITY
End: 2019-05-23 | Stop reason: HOSPADM

## 2019-04-23 RX ORDER — SODIUM CHLORIDE 0.9 % (FLUSH) 0.9 %
10 SYRINGE (ML) INJECTION
Status: COMPLETED | OUTPATIENT
Start: 2019-04-23 | End: 2019-04-23

## 2019-04-23 RX ORDER — SODIUM CHLORIDE 0.9 % (FLUSH) 0.9 %
10 SYRINGE (ML) INJECTION
Status: CANCELLED | OUTPATIENT
Start: 2019-04-23

## 2019-04-23 RX ORDER — HEPARIN 100 UNIT/ML
500 SYRINGE INTRAVENOUS
Status: COMPLETED | OUTPATIENT
Start: 2019-04-23 | End: 2019-04-23

## 2019-04-23 RX ORDER — SODIUM CHLORIDE 0.9 % (FLUSH) 0.9 %
10 SYRINGE (ML) INJECTION
Status: DISCONTINUED | OUTPATIENT
Start: 2019-04-23 | End: 2019-04-24 | Stop reason: HOSPADM

## 2019-04-23 RX ORDER — HEPARIN 100 UNIT/ML
500 SYRINGE INTRAVENOUS
Status: DISCONTINUED | OUTPATIENT
Start: 2019-04-23 | End: 2019-04-24 | Stop reason: HOSPADM

## 2019-04-23 RX ORDER — SODIUM CHLORIDE 0.9 % (FLUSH) 0.9 %
10 SYRINGE (ML) INJECTION
Status: CANCELLED | OUTPATIENT
Start: 2019-04-25

## 2019-04-23 RX ORDER — HEPARIN 100 UNIT/ML
500 SYRINGE INTRAVENOUS
Status: CANCELLED | OUTPATIENT
Start: 2019-04-25

## 2019-04-23 RX ORDER — HEPARIN 100 UNIT/ML
500 SYRINGE INTRAVENOUS
Status: CANCELLED | OUTPATIENT
Start: 2019-05-07

## 2019-04-23 RX ORDER — SODIUM CHLORIDE 0.9 % (FLUSH) 0.9 %
10 SYRINGE (ML) INJECTION
Status: CANCELLED | OUTPATIENT
Start: 2019-05-07

## 2019-04-23 RX ORDER — HEPARIN 100 UNIT/ML
500 SYRINGE INTRAVENOUS
Status: CANCELLED | OUTPATIENT
Start: 2019-04-23

## 2019-04-23 RX ADMIN — SODIUM CHLORIDE: 0.9 INJECTION, SOLUTION INTRAVENOUS at 03:04

## 2019-04-23 RX ADMIN — HEPARIN 500 UNITS: 100 SYRINGE at 05:04

## 2019-04-23 RX ADMIN — GEMCITABINE HYDROCHLORIDE 1740 MG: 200 INJECTION, SOLUTION INTRAVENOUS at 05:04

## 2019-04-23 RX ADMIN — Medication 10 ML: at 05:04

## 2019-04-23 RX ADMIN — HEPARIN 500 UNITS: 100 SYRINGE at 01:04

## 2019-04-23 RX ADMIN — PACLITAXEL 220 MG: 100 INJECTION, POWDER, LYOPHILIZED, FOR SUSPENSION INTRAVENOUS at 04:04

## 2019-04-23 RX ADMIN — Medication 10 ML: at 01:04

## 2019-04-23 RX ADMIN — GRANISETRON HYDROCHLORIDE 1 MG: 0.1 INJECTION, SOLUTION INTRAVENOUS at 03:04

## 2019-04-23 NOTE — PLAN OF CARE
Problem: Adult Inpatient Plan of Care  Goal: Patient-Specific Goal (Individualization)  Outcome: Ongoing (interventions implemented as appropriate)  1515-Labs , hx, and medications reviewed, patient is here to start new chemo, per Dr. Lyubov mesa to treat today with todays labs. Assessment completed. Discussed plan of care with patient. Patient in agreement. Chair reclined and warm blanket and snack offered.

## 2019-04-23 NOTE — PROGRESS NOTES
CC: Pancreatic adenocarcinoma, follow up      Oncology History        Diagnosis: Adenocarcinoma pancreas  Stage : Borderline resectable  Treatment: 2/26/19 - 3/26/19: 2 cycles neoadjuvant FOLFIRINOX (without bolus 5 FU)        HPI:  is a 67y/o lady with recently diagnosed pancreatic cancer. She has hypertension.  She has history of flank/ back pain for over a year. She describes this as constant, achy, progressively worsening . This had impaired her ADLs and interrupted her sleep. She has recent weight loss--15lb since Dec 2018. She has decreased appetite. She had also noticed change in stool to light color. She had colonoscopy on 1/8/2019, suffered complications. She was told she had perforated an ulcer. She was admitted to [facility]  for 9 days. CT scan done with oral contrast via NG tube r/t poor renal function. She was discharged 1/16/19.   She was told she had a pancreatic mass. She was evaluated here by . She is still not eating well.      Interval history:  She is here for a follow up. She is still not eating well. She continues to have nausea, abdominal pain. She has finished 1 cycle of FOLFIRINOX on 2/26/19. She had nausea, emesis and diarrhea after C1.C2 was on 3/26/19.She continued to have severe nausea, vomiting, diarrhea.       Review of Systems   Constitutional: Positive for malaise/fatigue and weight loss. Negative for chills and fever.   HENT: Negative for congestion, ear pain and nosebleeds.    Eyes: Negative for blurred vision, double vision and photophobia.   Respiratory: Negative for cough, hemoptysis and sputum production.    Cardiovascular: Negative for chest pain, palpitations and orthopnea.   Gastrointestinal: Positive for diarrhea, nausea and vomiting. Negative for constipation and heartburn.   Genitourinary: Negative for dysuria, frequency and urgency.   Musculoskeletal: Negative for myalgias.   Skin: Negative for rash.   Neurological: Negative for dizziness, tingling,  tremors, sensory change and headaches.   Endo/Heme/Allergies: Does not bruise/bleed easily.   Psychiatric/Behavioral: Negative for depression, substance abuse and suicidal ideas.       Vitals:    04/23/19 1023   BP: 112/75   Pulse: 80   Resp: 18   Temp: 98.7 °F (37.1 °C)     PS: ECOG2    Physical Exam   Constitutional: She is oriented to person, place, and time. She appears well-developed.   HENT:   Head: Normocephalic and atraumatic.   Mouth/Throat: No oropharyngeal exudate.   Eyes: Pupils are equal, round, and reactive to light. No scleral icterus.   Neck: Normal range of motion.   Cardiovascular: Normal rate and regular rhythm.   No murmur heard.  Pulmonary/Chest: Effort normal and breath sounds normal. No respiratory distress. She has no wheezes.   Abdominal: Soft. Bowel sounds are normal. She exhibits no distension. There is no tenderness. There is no rebound.   Musculoskeletal: She exhibits edema.   Lymphadenopathy:     She has no cervical adenopathy.   Neurological: She is alert and oriented to person, place, and time. No cranial nerve deficit.   Skin: Skin is warm.   Psychiatric: She has a normal mood and affect.         Current Outpatient Medications   Medication Sig    apixaban (ELIQUIS) 5 mg (74 tabs) DsPk For the first 7 days take two 5 mg tablets twice daily.  After 7 days take one 5 mg tablet twice daily.    bisoprolol-hydrochlorothiazide (ZIAC) 10-6.25 mg per tablet Take 1 tablet by mouth 2 (two) times daily.    dexamethasone (DECADRON) 2 MG tablet Take 1 tablet (2 mg total) by mouth 2 (two) times daily with meals.    diphenoxylate-atropine 2.5-0.025 mg (LOMOTIL) 2.5-0.025 mg per tablet Take 1 tablet by mouth 4 (four) times daily as needed for Diarrhea.    dronabinol (MARINOL) 5 MG capsule Take 1 capsule (5 mg total) by mouth 2 (two) times daily before meals.    hydrALAZINE (APRESOLINE) 50 MG tablet Take 75 mg by mouth 3 (three) times daily.    loperamide (IMODIUM) 2 mg capsule Take 2 mg by  mouth 4 (four) times daily as needed for Diarrhea.    LORazepam (ATIVAN) 1 MG tablet Take 1 mg by mouth every 8 (eight) hours as needed for Anxiety.    morphine (MS CONTIN) 15 MG 12 hr tablet Take 1 tablet (15 mg total) by mouth 2 (two) times daily.    morphine (MSIR) 15 MG tablet Take 2 tablets (30 mg total) by mouth every 4 (four) hours as needed for Pain.    ondansetron (ZOFRAN) 8 MG tablet Take 1 tablet (8 mg total) by mouth every 8 (eight) hours as needed for Nausea.    pantoprazole (PROTONIX) 40 MG tablet Take 1 tablet (40 mg total) by mouth once daily.    potassium chloride SA (K-DUR,KLOR-CON) 20 MEQ tablet Take 2 tablets (40 mEq total) by mouth 2 (two) times daily.    promethazine (PHENERGAN) 25 MG tablet TAKE 1 TABLET(25 MG) BY MOUTH EVERY 6 HOURS AS NEEDED FOR NAUSEA     Current Facility-Administered Medications   Medication    potassium chloride CR tablet 40 mEq       1/21/19 CT chest, abdomen and pelvis with cont             FINDINGS:  The soft tissues at the base of the neck are unremarkable.  The thyroid gland is normal in size and configuration.  Hypodensity in the right lateral aspect of the thyroid isthmus measures 0.7 cm.  There is no abnormal lymph node enlargement.    There is no axillary, mediastinal, or hilar lymphadenopathy.  The hilar contours are unremarkable.  The esophagus is normal in course and contour.    The thoracic aorta is normal in course, caliber, and contour without significant atherosclerotic calcifications.The heart does not appear enlarged and there is no pericardial effusion.    The trachea and proximal airways are patent.  The lungs are symmetrically expanded and demonstrate no convincing evidence of consolidation, pleural thickening, pneumothorax, or pleural fluid.  Right hemidiaphragmatic elevation.    The liver is prominent in size measuring 18.1 cm in craniocaudal dimension and slightly decreased in attenuation reflecting hepatic steatosis.  There are several  hypodensities in the right hepatic lobe at the periphery, largest measuring 1.4 cm, which are favored to represent cysts.  The gallbladder is distended without evidence of calcified gallstones, or wall thickening, or character pericholecystic fluid.  There is hepatic subcapsular fluid collection posterior to the gallbladder which measures approximately 3.3 x 1.2 cm.  There is marked intra and extrahepatic biliary ductal dilatation with the common bile duct measuring up to 1.6 cm in transverse dimension.    There is an ill-defined hypodense solid lesion in the head of the pancreas measuring 4.3 x 4.5 x 4.4 cm (AP x TV x CC; axial series 3, image 114 and coronal series 601, image 52) with associated pancreatic ductal dilatation with the pancreatic duct measuring up to 1.1 cm.  Assessment of the portal vein, splenic vein, SMV demonstrates pancreatic mass surrounding the superior mesenteric vein (greater than 180 degrees), also vessel contour producing concentric narrowing of the lumen consistent with vascular encasement.  The IVC and celiac and superior mesenteric arteries are not involved.    The stomach, spleen, pancreas, and adrenal glands are unremarkable.    The kidneys are normal in size and location.  Bilateral renal hypodensities too small to fully characterize, though may represent cysts.  There is no evidence of hydronephrosis.  The ureters appear normal in course and caliber without evidence of ureteral dilatation. The urinary bladder demonstrates no significant abnormality. Operative change of prior hysterectomy with small approximately 2 cm cystic lesion in the left adnexa likely an ovarian cyst.    The abdominal aorta is normal in course and caliber without significant atherosclerotic calcifications.    The visualized loops of small bowel show no evidence of obstruction or inflammation. Large bowel is unremarkable.  High density material in the appendix, likely an appendicolith.  There is no  intraperitoneal free air.  Mild pelvic ascites.  There is no evidence of lymph node enlargement in the abdomen or pelvis.    When viewed with bone windows the osseous structures are unremarkable.  Fat containing umbilical hernia.       Impression         Ill-defined hypodense solid lesion of the head the pancreas concerning for pancreatic adenocarcinoma with associated intrahepatic and extrahepatic biliary ductal dilatation, pancreatic ductal dilatation and associated encasement of the superior mesenteric vein as detailed above.  There are multiple normal sized regional lymph nodes.    Left adnexal hypodensity, likely a cyst.  This can be followed with routine oncologic surveillance.    Additional findings of hypodense right thyroid nodule, hepatomegaly with hepatic steatosis, right hepatic cysts, bilateral renal cysts, and umbilical hernia.    RECIST SUMMARY:    Date of prior examination for comparison: Baseline study    Lesion 1: Pancreatic head mass.  4.5 cm. Series 2 image 138.  New lesion.                  1/25/19 EGD/EUS           ENDOSCOPIC FINDING: :       There is no endoscopic evidence of bezoar/food (residue) or fluid collections in the entire examined stomach (stomach appears to be  emptying).       An acquired extrinsic severe stenosis was found in the first portion of the duodenum and was traversed (with the EGD scope, but could not  be traversed with the EUS scope).       ENDOSONOGRAPHIC FINDING: :       The esophagus, stomach and duodenum and adjacent structures were visualized endosonographically. Endosonographic imaging in the visualized portion of the liver showed no lesion.       A mass was identified in the pancreatic head. The mass was  hypoechoic. The mass measured 41 mm by 35 mm in maximal  cross-sectional diameter. The outer margins were irregular. There  was sonographic evidence suggesting invasion into the superior        mesenteric vein (manifested by encasement) and peripancreatic fat.  The remainder of the pancreas was examined. The endosonographic appearance of parenchyma and the upstream pancreatic duct indicated        duct dilation, a tortuous/ectatic duct and parenchymal atrophy.    Impression:           - Acquired duodenal stenosis.                        - A mass was identified in the pancreatic head. This was staged T3 Nx Mx by endosonographic  criteria. The staging applies if malignancy is confirmed.                        - No specimens collected.  Recommendation:       - Discharge patient to home (ambulatory).                        - Resume previous diet; Discharge to home                         (ambulatory); Resume outpatient medications        2/1/19 ERCP        Findings:      ENDOSCOPIC  FINDING:The examined esophagus was endoscopically normal. The entire examined stomach was endoscopically normal. The examined duodenum was endoscopically normal. A small amount of food (residue) was  found in the gastric antrum. An acquired malignant-appearing, intrinsic severe stenosis was found in the first portion of the duodenum and was non-traversed.      ENDOSONOGRAPHIC FINDING:The esophagus stomach and duodenum and adjacent structures were visualized endosonographically. A mass was identified in the pancreatic head. There was dilation in the common hepatic duct which measured up to 15 mm. The decision was made to make a bile duct  puncture into the common bile duct from the duodenal bulb. Under EUS guidance a 19guage needle was used to puncture into the  Common hepatic duct. 20cc's of dark bile was aspirated. A cholangiogram was performed through the 19 g needle,       A guidewire was advanced through the EUS needle and into the intrahepatic biliary tree. A cautery-tipped, lumen-apposing stent delivery system was advanced across the transduodenal puncture site and into the biliary system using cautery. A 10x 10mm lumen-apposing stent (Axios) was placed into the biliary tree, with one end in  the extrahepatic duct and the other in the duodenal. Good drainage of bile and contrast was noted post stent placement. The wire remained in place and a 5fr 5cm double pigtail stent was placed across the LAMSthe stent was seen to pigtail int he common hepatic duct and in the duodenal bulb.       After succesful biliary drainage, the duodenal stricture was stented  with a 22 mm x 12 cm WallFlex stent under fluoroscopic guidance.    Impression:           Succesful palliation of malignant biliary and gastric obstruction with choledochoduodenstomy and duodenal stent placement           2/8/19 PET CT             COMPARISON:  CT chest abdomen pelvis with contrast 01/21/2019    FINDINGS:  Quality of the study: Adequate.    There is redemonstration of an ill-defined pancreatic head mass which demonstrates hypermetabolic activity (SUV max 5.78).  This mass measures at least 4.7 x 4.3 cm with distal pancreatic duct dilation.  When compared to CT examination dated 01/21/2019, there has been interval placement of a duodenal stent as well as choledochojejunostomy with stent placement.  There is moderate pneumobilia throughout both left and right hepatic lobes.    There is a small focus of hypermetabolic soft tissue nodularity along the proximal sigmoid colon best appreciated on axial CT image 205.  This finding may relate to physiologic bowel activity, however per chart review this patient underwent complicated colonoscopy and inflammation of the site of complication may present similarly.  Additionally, neoplasia including 2nd synchronous primary or metastatic disease cannot be entirely excluded.    Index lesions:    *Pancreatic head mass: SUV max 5.78 (axial fused CT image 140).  *Sigmoid colon: SUV max 9.72 (axial fused CT image 205).  Physiologic uptake of the tracer is present within the brain, salivary glands, myocardium, GI and  tracts.    Incidental CT findings: Noncontrast CT demonstrates no acute abnormality.        Impression         Hypermetabolic pancreatic head mass consistent with patient's known pancreatic adenocarcinoma.    Focal, hypermetabolic soft tissue thickening along the proximal sigmoid colon may relate to physiologic bowel activity or inflammation from recent colonoscopy complication, however neoplasia including 2nd synchronous primary or metastatic disease cannot be entirely excluded.     4/10/19 left LE venous doppler      Report Summary:  Impression:   Left:Color flow duplex exam reveals only partly compressible CFV and popliteal vein with intraluminal echoes noted. Venous flow is continuous suggesting possible proximal venous obstruction also. These findings are consistent with age indeterminate deep   vein thrombosis of the left lower extremity.   Right: CFV patent.        Assessment:     1. Adenocarcinoma of head of pancreas  2. Malignant stricture of duodenum  3. Biliary obstruction  4. Jaundice  5. Weight loss  6. Anemia secondary to chemotherapy  7. Elevated CA 19-9  8. Anorexia  9. Nausea  10. Cancer associated pain  11. Hyperglycemia  12. Thrombocytopenia  13. Diarrhea secondary to chemotherapy  14. Left LE DVT        Plan:        1,2,3,4: She is s/p biliary and duodenal stents. Her pancreatic cancer appears to be borderline resectable ( > 180 degree encasement of SMV). No apparent arterial involvement. Case was discussed at upper GI conference.    She has been evaluated by surgical oncology. No evidence of metastatic disease on PET CT on 2/8/19. I discussed siddhartha adjuvant chemotherapy with FOLFIRINOX in detail and explained that it is indicated at this time to debulk the tumor and possibly proceed to surgery eventually. She started C1 FOLFIRINOX without 5FU bolus, on 2/26/19.  She had severe emesis, nasuea, diarrhea for several days, well into the 2nd week after her initial chemotherapy.   She had similar problems after C2 FOLFIRINOX. She has tolerated the treatments poorly, despite anti-diarrheal,  anti-emetic and other supportive care measures. She has lost more weight and has been eating poorly.  She stopped FOLFIRINOX and is switching to gemcitabine-nab Paclitaxel.     5.8: Secondary to #1. She was evaluated by nutrition here. She is on Marinol. I emphasized the need to maintain calorie count, and maintain adequate calorie intake. She cannot tolerate ENSURE/BOOST. She is looking for lactose free versions.       6. Hgb 9.3 today.      9. On Zofran PRN. She also takes Phenergan. She will also start Decadron 2mg BID from 4/8/19.      10. On Morphine extended release and IR PRN      11. Possibly secondary to pancreatic insufficiency. No known diagnosis of DM previously.     12. Mild, asymptomatic, secondary to chemotherapy        13. She is on Imodium, PRN Lomotil.C diff negative on 4/8/19. She will take scheduled Imodium every 3 hrs. She will start CREON.       14. On Apixaban

## 2019-04-24 DIAGNOSIS — C25.9 MALIGNANT NEOPLASM OF PANCREAS, UNSPECIFIED LOCATION OF MALIGNANCY: ICD-10-CM

## 2019-04-24 RX ORDER — ONDANSETRON HYDROCHLORIDE 8 MG/1
TABLET, FILM COATED ORAL
Qty: 30 TABLET | Refills: 0 | Status: SHIPPED | OUTPATIENT
Start: 2019-04-24 | End: 2019-05-03 | Stop reason: SDUPTHER

## 2019-04-30 ENCOUNTER — INFUSION (OUTPATIENT)
Dept: INFUSION THERAPY | Facility: HOSPITAL | Age: 69
End: 2019-04-30
Attending: INTERNAL MEDICINE
Payer: COMMERCIAL

## 2019-04-30 VITALS
RESPIRATION RATE: 20 BRPM | HEART RATE: 83 BPM | SYSTOLIC BLOOD PRESSURE: 130 MMHG | DIASTOLIC BLOOD PRESSURE: 65 MMHG | TEMPERATURE: 98 F

## 2019-04-30 DIAGNOSIS — C25.9 PANCREATIC ADENOCARCINOMA: Primary | ICD-10-CM

## 2019-04-30 LAB
ABO + RH BLD: NORMAL
ALBUMIN SERPL BCP-MCNC: 2 G/DL (ref 3.5–5.2)
ALP SERPL-CCNC: 149 U/L (ref 55–135)
ALT SERPL W/O P-5'-P-CCNC: 59 U/L (ref 10–44)
ANION GAP SERPL CALC-SCNC: 11 MMOL/L (ref 8–16)
ANISOCYTOSIS BLD QL SMEAR: SLIGHT
AST SERPL-CCNC: 67 U/L (ref 10–40)
BASOPHILS # BLD AUTO: 0.02 K/UL (ref 0–0.2)
BASOPHILS NFR BLD: 0.6 % (ref 0–1.9)
BILIRUB SERPL-MCNC: 1.1 MG/DL (ref 0.1–1)
BLD GP AB SCN CELLS X3 SERPL QL: NORMAL
BLD PROD TYP BPU: NORMAL
BLOOD UNIT EXPIRATION DATE: NORMAL
BLOOD UNIT TYPE CODE: 9500
BLOOD UNIT TYPE: NORMAL
BUN SERPL-MCNC: 42 MG/DL (ref 8–23)
CALCIUM SERPL-MCNC: 7.9 MG/DL (ref 8.7–10.5)
CHLORIDE SERPL-SCNC: 102 MMOL/L (ref 95–110)
CO2 SERPL-SCNC: 22 MMOL/L (ref 23–29)
CODING SYSTEM: NORMAL
CREAT SERPL-MCNC: 1.5 MG/DL (ref 0.5–1.4)
DIFFERENTIAL METHOD: ABNORMAL
DISPENSE STATUS: NORMAL
EOSINOPHIL # BLD AUTO: 0.1 K/UL (ref 0–0.5)
EOSINOPHIL NFR BLD: 1.9 % (ref 0–8)
ERYTHROCYTE [DISTWIDTH] IN BLOOD BY AUTOMATED COUNT: 17.8 % (ref 11.5–14.5)
EST. GFR  (AFRICAN AMERICAN): 41.6 ML/MIN/1.73 M^2
EST. GFR  (NON AFRICAN AMERICAN): 36 ML/MIN/1.73 M^2
GLUCOSE SERPL-MCNC: 160 MG/DL (ref 70–110)
HCT VFR BLD AUTO: 18.8 % (ref 37–48.5)
HGB BLD-MCNC: 6.1 G/DL (ref 12–16)
HYPOCHROMIA BLD QL SMEAR: ABNORMAL
IMM GRANULOCYTES # BLD AUTO: 0.02 K/UL (ref 0–0.04)
IMM GRANULOCYTES NFR BLD AUTO: 0.6 % (ref 0–0.5)
LYMPHOCYTES # BLD AUTO: 0.5 K/UL (ref 1–4.8)
LYMPHOCYTES NFR BLD: 15.4 % (ref 18–48)
MAGNESIUM SERPL-MCNC: 0.9 MG/DL (ref 1.6–2.6)
MCH RBC QN AUTO: 29.5 PG (ref 27–31)
MCHC RBC AUTO-ENTMCNC: 32.4 G/DL (ref 32–36)
MCV RBC AUTO: 91 FL (ref 82–98)
MONOCYTES # BLD AUTO: 0.1 K/UL (ref 0.3–1)
MONOCYTES NFR BLD: 3.5 % (ref 4–15)
NEUTROPHILS # BLD AUTO: 2.4 K/UL (ref 1.8–7.7)
NEUTROPHILS NFR BLD: 78 % (ref 38–73)
NRBC BLD-RTO: 0 /100 WBC
NUM UNITS TRANS PACKED RBC: NORMAL
PLATELET # BLD AUTO: 78 K/UL (ref 150–350)
PLATELET BLD QL SMEAR: ABNORMAL
PMV BLD AUTO: 12.5 FL (ref 9.2–12.9)
POIKILOCYTOSIS BLD QL SMEAR: ABNORMAL
POTASSIUM SERPL-SCNC: 3.1 MMOL/L (ref 3.5–5.1)
PROT SERPL-MCNC: 5.6 G/DL (ref 6–8.4)
RBC # BLD AUTO: 2.07 M/UL (ref 4–5.4)
SODIUM SERPL-SCNC: 135 MMOL/L (ref 136–145)
WBC # BLD AUTO: 3.11 K/UL (ref 3.9–12.7)

## 2019-04-30 PROCEDURE — 25000003 PHARM REV CODE 250: Performed by: INTERNAL MEDICINE

## 2019-04-30 PROCEDURE — 63600175 PHARM REV CODE 636 W HCPCS: Performed by: INTERNAL MEDICINE

## 2019-04-30 PROCEDURE — 36430 TRANSFUSION BLD/BLD COMPNT: CPT

## 2019-04-30 PROCEDURE — 85025 COMPLETE CBC W/AUTO DIFF WBC: CPT

## 2019-04-30 PROCEDURE — 86901 BLOOD TYPING SEROLOGIC RH(D): CPT

## 2019-04-30 PROCEDURE — A4216 STERILE WATER/SALINE, 10 ML: HCPCS | Performed by: INTERNAL MEDICINE

## 2019-04-30 PROCEDURE — 86920 COMPATIBILITY TEST SPIN: CPT

## 2019-04-30 PROCEDURE — 27201040 HC RC 50 FILTER

## 2019-04-30 PROCEDURE — 80053 COMPREHEN METABOLIC PANEL: CPT

## 2019-04-30 PROCEDURE — 83735 ASSAY OF MAGNESIUM: CPT

## 2019-04-30 PROCEDURE — 96368 THER/DIAG CONCURRENT INF: CPT

## 2019-04-30 PROCEDURE — P9038 RBC IRRADIATED: HCPCS

## 2019-04-30 RX ORDER — HEPARIN 100 UNIT/ML
500 SYRINGE INTRAVENOUS
Status: CANCELLED | OUTPATIENT
Start: 2019-04-30

## 2019-04-30 RX ORDER — HEPARIN 100 UNIT/ML
500 SYRINGE INTRAVENOUS
Status: COMPLETED | OUTPATIENT
Start: 2019-04-30 | End: 2019-04-30

## 2019-04-30 RX ORDER — SODIUM CHLORIDE 0.9 % (FLUSH) 0.9 %
10 SYRINGE (ML) INJECTION
Status: COMPLETED | OUTPATIENT
Start: 2019-04-30 | End: 2019-04-30

## 2019-04-30 RX ORDER — HYDROCODONE BITARTRATE AND ACETAMINOPHEN 500; 5 MG/1; MG/1
TABLET ORAL ONCE
Status: COMPLETED | OUTPATIENT
Start: 2019-04-30 | End: 2019-04-30

## 2019-04-30 RX ORDER — HYDROCODONE BITARTRATE AND ACETAMINOPHEN 500; 5 MG/1; MG/1
TABLET ORAL ONCE
Status: CANCELLED | OUTPATIENT
Start: 2019-04-30 | End: 2019-04-30

## 2019-04-30 RX ORDER — SODIUM CHLORIDE 0.9 % (FLUSH) 0.9 %
10 SYRINGE (ML) INJECTION
Status: CANCELLED | OUTPATIENT
Start: 2019-04-30

## 2019-04-30 RX ORDER — LORAZEPAM/0.9% SODIUM CHLORIDE 100MG/0.1L
2 PLASTIC BAG, INJECTION (ML) INTRAVENOUS
Status: CANCELLED | OUTPATIENT
Start: 2019-05-01

## 2019-04-30 RX ORDER — LORAZEPAM/0.9% SODIUM CHLORIDE 100MG/0.1L
2 PLASTIC BAG, INJECTION (ML) INTRAVENOUS
Status: COMPLETED | OUTPATIENT
Start: 2019-04-30 | End: 2019-04-30

## 2019-04-30 RX ORDER — LORAZEPAM/0.9% SODIUM CHLORIDE 100MG/0.1L
2 PLASTIC BAG, INJECTION (ML) INTRAVENOUS
Status: CANCELLED | OUTPATIENT
Start: 2019-04-30

## 2019-04-30 RX ADMIN — HEPARIN 500 UNITS: 100 SYRINGE at 06:04

## 2019-04-30 RX ADMIN — SODIUM CHLORIDE: 0.9 INJECTION, SOLUTION INTRAVENOUS at 04:04

## 2019-04-30 RX ADMIN — Medication 10 ML: at 01:04

## 2019-04-30 RX ADMIN — HEPARIN 500 UNITS: 100 SYRINGE at 01:04

## 2019-04-30 RX ADMIN — Medication 10 ML: at 06:04

## 2019-04-30 RX ADMIN — MAGNESIUM SULFATE IN WATER 2 G: 40 INJECTION, SOLUTION INTRAVENOUS at 03:04

## 2019-04-30 NOTE — NURSING
1450P  Dr. Mcarthur notified of Lab results. No chemo today. Pt to get one unit of blood and 2gms of Magnesium as ordered.  1510p Type and screen drawn from pts port and sent to blood bank, to prepare for one unit of blood.Pt in agreement to transfusion, consent obtained.

## 2019-04-30 NOTE — PLAN OF CARE
Problem: Adult Inpatient Plan of Care  Goal: Plan of Care Review  Outcome: Ongoing (interventions implemented as appropriate)  Tolerated Mag infusion and blood transfusion without difficulty. No complaints voiced. AVS given to pt. Instructed to notify Md with any concerns or problems. Pt verbalized understanding.

## 2019-05-03 ENCOUNTER — HOSPITAL ENCOUNTER (EMERGENCY)
Facility: HOSPITAL | Age: 69
Discharge: HOME | End: 2019-05-04
Attending: EMERGENCY MEDICINE
Payer: COMMERCIAL

## 2019-05-03 DIAGNOSIS — R53.83 FATIGUE: ICD-10-CM

## 2019-05-03 DIAGNOSIS — C25.9 MALIGNANT NEOPLASM OF PANCREAS, UNSPECIFIED LOCATION OF MALIGNANCY: ICD-10-CM

## 2019-05-03 DIAGNOSIS — R53.83 MALAISE AND FATIGUE: ICD-10-CM

## 2019-05-03 DIAGNOSIS — E87.8 ELECTROLYTE ABNORMALITY: Primary | ICD-10-CM

## 2019-05-03 DIAGNOSIS — R53.81 MALAISE AND FATIGUE: ICD-10-CM

## 2019-05-03 LAB
ALBUMIN SERPL BCP-MCNC: 2.1 G/DL (ref 3.5–5.2)
ALP SERPL-CCNC: 136 U/L (ref 55–135)
ALT SERPL W/O P-5'-P-CCNC: 55 U/L (ref 10–44)
ANION GAP SERPL CALC-SCNC: 8 MMOL/L (ref 8–16)
ANION GAP SERPL CALC-SCNC: 9 MMOL/L (ref 8–16)
ANISOCYTOSIS BLD QL SMEAR: SLIGHT
APTT BLDCRRT: 29.2 SEC (ref 21–32)
AST SERPL-CCNC: 73 U/L (ref 10–40)
BASO STIPL BLD QL SMEAR: ABNORMAL
BASOPHILS NFR BLD: 0 % (ref 0–1.9)
BILIRUB SERPL-MCNC: 1.3 MG/DL (ref 0.1–1)
BUN SERPL-MCNC: 21 MG/DL (ref 8–23)
BUN SERPL-MCNC: 21 MG/DL (ref 8–23)
BURR CELLS BLD QL SMEAR: ABNORMAL
CALCIUM SERPL-MCNC: 8.3 MG/DL (ref 8.7–10.5)
CALCIUM SERPL-MCNC: 8.5 MG/DL (ref 8.7–10.5)
CHLORIDE SERPL-SCNC: 105 MMOL/L (ref 95–110)
CHLORIDE SERPL-SCNC: 105 MMOL/L (ref 95–110)
CO2 SERPL-SCNC: 24 MMOL/L (ref 23–29)
CO2 SERPL-SCNC: 25 MMOL/L (ref 23–29)
CREAT SERPL-MCNC: 1 MG/DL (ref 0.5–1.4)
CREAT SERPL-MCNC: 1 MG/DL (ref 0.5–1.4)
DACRYOCYTES BLD QL SMEAR: ABNORMAL
DIFFERENTIAL METHOD: ABNORMAL
EOSINOPHIL NFR BLD: 4 % (ref 0–8)
ERYTHROCYTE [DISTWIDTH] IN BLOOD BY AUTOMATED COUNT: 17.2 % (ref 11.5–14.5)
EST. GFR  (AFRICAN AMERICAN): >60 ML/MIN/1.73 M^2
EST. GFR  (AFRICAN AMERICAN): >60 ML/MIN/1.73 M^2
EST. GFR  (NON AFRICAN AMERICAN): 58.8 ML/MIN/1.73 M^2
EST. GFR  (NON AFRICAN AMERICAN): 58.8 ML/MIN/1.73 M^2
GIANT PLATELETS BLD QL SMEAR: PRESENT
GLUCOSE SERPL-MCNC: 115 MG/DL (ref 70–110)
GLUCOSE SERPL-MCNC: 133 MG/DL (ref 70–110)
HCT VFR BLD AUTO: 22.5 % (ref 37–48.5)
HGB BLD-MCNC: 7.5 G/DL (ref 12–16)
HYPOCHROMIA BLD QL SMEAR: ABNORMAL
IMM GRANULOCYTES # BLD AUTO: ABNORMAL K/UL (ref 0–0.04)
IMM GRANULOCYTES NFR BLD AUTO: ABNORMAL % (ref 0–0.5)
INR PPP: 1.5 (ref 0.8–1.2)
LACTATE SERPL-SCNC: 1 MMOL/L (ref 0.5–2.2)
LYMPHOCYTES NFR BLD: 48 % (ref 18–48)
MAGNESIUM SERPL-MCNC: 1.2 MG/DL (ref 1.6–2.6)
MCH RBC QN AUTO: 28.5 PG (ref 27–31)
MCHC RBC AUTO-ENTMCNC: 33.3 G/DL (ref 32–36)
MCV RBC AUTO: 86 FL (ref 82–98)
MONOCYTES NFR BLD: 8 % (ref 4–15)
MYELOCYTES NFR BLD MANUAL: 2 %
NEUTROPHILS NFR BLD: 38 % (ref 38–73)
NRBC BLD-RTO: 10 /100 WBC
OVALOCYTES BLD QL SMEAR: ABNORMAL
PLATELET # BLD AUTO: 115 K/UL (ref 150–350)
PLATELET BLD QL SMEAR: ABNORMAL
PMV BLD AUTO: 12.3 FL (ref 9.2–12.9)
POIKILOCYTOSIS BLD QL SMEAR: SLIGHT
POLYCHROMASIA BLD QL SMEAR: ABNORMAL
POTASSIUM SERPL-SCNC: 2.7 MMOL/L (ref 3.5–5.1)
POTASSIUM SERPL-SCNC: 2.9 MMOL/L (ref 3.5–5.1)
PROT SERPL-MCNC: 5.5 G/DL (ref 6–8.4)
PROTHROMBIN TIME: 15.1 SEC (ref 9–12.5)
RBC # BLD AUTO: 2.63 M/UL (ref 4–5.4)
SODIUM SERPL-SCNC: 138 MMOL/L (ref 136–145)
SODIUM SERPL-SCNC: 138 MMOL/L (ref 136–145)
WBC # BLD AUTO: 1.68 K/UL (ref 3.9–12.7)

## 2019-05-03 PROCEDURE — 99285 EMERGENCY DEPT VISIT HI MDM: CPT | Mod: 25

## 2019-05-03 PROCEDURE — 85730 THROMBOPLASTIN TIME PARTIAL: CPT

## 2019-05-03 PROCEDURE — 93010 ELECTROCARDIOGRAM REPORT: CPT | Mod: ,,, | Performed by: INTERNAL MEDICINE

## 2019-05-03 PROCEDURE — 80053 COMPREHEN METABOLIC PANEL: CPT

## 2019-05-03 PROCEDURE — 63600175 PHARM REV CODE 636 W HCPCS: Performed by: EMERGENCY MEDICINE

## 2019-05-03 PROCEDURE — 99285 PR EMERGENCY DEPT VISIT,LEVEL V: ICD-10-PCS | Mod: ,,, | Performed by: EMERGENCY MEDICINE

## 2019-05-03 PROCEDURE — 85007 BL SMEAR W/DIFF WBC COUNT: CPT

## 2019-05-03 PROCEDURE — 83605 ASSAY OF LACTIC ACID: CPT

## 2019-05-03 PROCEDURE — 96367 TX/PROPH/DG ADDL SEQ IV INF: CPT

## 2019-05-03 PROCEDURE — 25000003 PHARM REV CODE 250: Performed by: EMERGENCY MEDICINE

## 2019-05-03 PROCEDURE — 85610 PROTHROMBIN TIME: CPT

## 2019-05-03 PROCEDURE — 93005 ELECTROCARDIOGRAM TRACING: CPT

## 2019-05-03 PROCEDURE — 93010 EKG 12-LEAD: ICD-10-PCS | Mod: ,,, | Performed by: INTERNAL MEDICINE

## 2019-05-03 PROCEDURE — 85027 COMPLETE CBC AUTOMATED: CPT

## 2019-05-03 PROCEDURE — 96365 THER/PROPH/DIAG IV INF INIT: CPT

## 2019-05-03 PROCEDURE — 80048 BASIC METABOLIC PNL TOTAL CA: CPT

## 2019-05-03 PROCEDURE — 99285 EMERGENCY DEPT VISIT HI MDM: CPT | Mod: ,,, | Performed by: EMERGENCY MEDICINE

## 2019-05-03 PROCEDURE — 83735 ASSAY OF MAGNESIUM: CPT

## 2019-05-03 RX ORDER — ONDANSETRON HYDROCHLORIDE 8 MG/1
TABLET, FILM COATED ORAL
Qty: 30 TABLET | Refills: 0 | Status: SHIPPED | OUTPATIENT
Start: 2019-05-03 | End: 2019-05-10 | Stop reason: SDUPTHER

## 2019-05-03 RX ORDER — POTASSIUM CHLORIDE 20 MEQ/1
40 TABLET, EXTENDED RELEASE ORAL ONCE
Status: COMPLETED | OUTPATIENT
Start: 2019-05-03 | End: 2019-05-03

## 2019-05-03 RX ORDER — POTASSIUM CHLORIDE 7.45 MG/ML
10 INJECTION INTRAVENOUS
Status: COMPLETED | OUTPATIENT
Start: 2019-05-03 | End: 2019-05-04

## 2019-05-03 RX ORDER — POTASSIUM CHLORIDE 20 MEQ/1
40 TABLET, EXTENDED RELEASE ORAL ONCE
Status: COMPLETED | OUTPATIENT
Start: 2019-05-04 | End: 2019-05-04

## 2019-05-03 RX ORDER — MAGNESIUM SULFATE HEPTAHYDRATE 40 MG/ML
2 INJECTION, SOLUTION INTRAVENOUS
Status: COMPLETED | OUTPATIENT
Start: 2019-05-03 | End: 2019-05-03

## 2019-05-03 RX ORDER — PROMETHAZINE HYDROCHLORIDE 25 MG/ML
12.5 INJECTION, SOLUTION INTRAMUSCULAR; INTRAVENOUS
Status: DISCONTINUED | OUTPATIENT
Start: 2019-05-03 | End: 2019-05-03 | Stop reason: SDUPTHER

## 2019-05-03 RX ORDER — CALCIUM CARBONATE 200(500)MG
2000 TABLET,CHEWABLE ORAL ONCE
Status: COMPLETED | OUTPATIENT
Start: 2019-05-03 | End: 2019-05-03

## 2019-05-03 RX ORDER — PROMETHAZINE HYDROCHLORIDE 25 MG/ML
12.5 INJECTION, SOLUTION INTRAMUSCULAR; INTRAVENOUS ONCE
Status: DISCONTINUED | OUTPATIENT
Start: 2019-05-03 | End: 2019-05-03 | Stop reason: SDUPTHER

## 2019-05-03 RX ADMIN — PROMETHAZINE HYDROCHLORIDE 12.5 MG: 25 INJECTION INTRAMUSCULAR; INTRAVENOUS at 08:05

## 2019-05-03 RX ADMIN — POTASSIUM CHLORIDE 10 MEQ: 10 INJECTION, SOLUTION INTRAVENOUS at 11:05

## 2019-05-03 RX ADMIN — MAGNESIUM SULFATE IN WATER 2 G: 40 INJECTION, SOLUTION INTRAVENOUS at 09:05

## 2019-05-03 RX ADMIN — SODIUM CHLORIDE 500 ML: 0.9 INJECTION, SOLUTION INTRAVENOUS at 08:05

## 2019-05-03 RX ADMIN — CALCIUM CARBONATE (ANTACID) CHEW TAB 500 MG 2000 MG: 500 CHEW TAB at 10:05

## 2019-05-03 RX ADMIN — POTASSIUM CHLORIDE 40 MEQ: 1500 TABLET, EXTENDED RELEASE ORAL at 10:05

## 2019-05-04 VITALS
HEART RATE: 69 BPM | BODY MASS INDEX: 25.34 KG/M2 | TEMPERATURE: 98 F | WEIGHT: 143 LBS | DIASTOLIC BLOOD PRESSURE: 67 MMHG | RESPIRATION RATE: 18 BRPM | SYSTOLIC BLOOD PRESSURE: 135 MMHG | OXYGEN SATURATION: 100 % | HEIGHT: 63 IN

## 2019-05-04 PROCEDURE — 25000003 PHARM REV CODE 250: Performed by: EMERGENCY MEDICINE

## 2019-05-04 PROCEDURE — 63600175 PHARM REV CODE 636 W HCPCS: Performed by: EMERGENCY MEDICINE

## 2019-05-04 RX ORDER — HEPARIN 100 UNIT/ML
5 SYRINGE INTRAVENOUS
Status: COMPLETED | OUTPATIENT
Start: 2019-05-04 | End: 2019-05-04

## 2019-05-04 RX ADMIN — POTASSIUM CHLORIDE 40 MEQ: 1500 TABLET, EXTENDED RELEASE ORAL at 12:05

## 2019-05-04 RX ADMIN — HEPARIN 500 UNITS: 100 SYRINGE at 12:05

## 2019-05-04 NOTE — ED TRIAGE NOTES
"Pt presents from home with increasing nausea, vomiting, diarrhea, and generalized weakness. Pt states her last dose of chemo was 2 weeks ago. She had a blood transfusion on Tuesday and "hasnt felt right since then." Pt also states she has had increasing SOB over the last few days. Pt denies any cough, CP, or any other symptoms at this time. Pt says she had fevers last week but is afebrile now.  "

## 2019-05-04 NOTE — ED NOTES
"LOC: Patient name and date of birth verified for Roopa Hanley. The patient is awake, alert and aware of environment with an appropriate affect, the patient is oriented x 3 and speaking appropriately.   APPEARANCE: Patient resting comfortably, patient is clean and well groomed, patient's clothing is properly fastened.  SKIN: The skin is warm and dry, color consistent with ethnicity, patient has normal skin turgor and moist mucus membranes, skin intact, no breakdown or bruising noted. Port cath located to R subclavian.  MUSCULOSKELETAL: Patient has limitations to L lower extremity. Pt moves all other extremities well.   RESPIRATORY: Respirations are spontaneous, patient has a normal effort and rate, no accessory muscle use noted.  CARDIAC: Patient has a normal rate, peripheral edema noted to bilateral lower extremities, pt states "more than baseline."  ABDOMEN: Soft and non tender, no distention noted.  NEUROLOGIC: Eyes open spontaneously, behavior appropriate to situation, follows commands, facial expression symmetrical.  "

## 2019-05-04 NOTE — ED PROVIDER NOTES
Encounter Date: 5/3/2019       History     Chief Complaint   Patient presents with    Multiple complaints     on chemo pancreatic cancer, feeling weak, trouble walking due weakness, nvd, had fever last week     68-year-old female with history of pancreatic cancer presents to the emergency department with fatigue,malaise, nausea and vomiting. The patient is currently receiving chemo with her last treatment being 2 weeks ago.  On Tuesday of this week the patient was scheduled for chemotherapy but did not receive the treatment due to having a low hemoglobin.  Instead the patient was given a blood transfusion which she reports worsening of her chronic symptoms which ultimately led to her visit this evening.  She denies chest pain, shortness of breath, LOC, back pain, change in mental status but endorses lightheadedness and diminished appetite        Review of patient's allergies indicates:  No Known Allergies  Past Medical History:   Diagnosis Date    Biliary obstruction     Duodenal stenosis     Hypertension     Jaundice      Past Surgical History:   Procedure Laterality Date    EGD (ESOPHAGOGASTRODUODENOSCOPY) N/A 2/1/2019    Performed by Celestino Christianson MD at [facility] (2ND FLR)    EGD (ESOPHAGOGASTRODUODENOSCOPY) N/A 1/25/2019    Performed by Rashawn Martinez MD at [facility] END[facility] (2ND FLR)    ERCP (ENDOSCOPIC RETROGRADE CHOLANGIOPANCREATOGRAPHY) N/A 1/25/2019    Performed by [facility] at [facility] (2ND FLR)    FOOT SURGERY Right 2012    HYSTERECTOMY      partial    [id number]-CATH N/A 2/22/2019    Performed by [facility] at [facility] OR 2ND FLR    ULTRASOUND, UPPER GI TRACT, ENDOSCOPIC N/A 2/1/2019    Performed by Celestino Christianson MD at [facility] END[facility] (2ND FLR)    ULTRASOUND, UPPER GI TRACT, ENDOSCOPIC N/A 1/25/2019    Performed by Rashawn Martinez MD at [facility] END[facility] (2ND FLR)     Family History   Problem Relation Age of Onset    Heart disease Mother     Stroke Father     Cancer Paternal Aunt          ovarian    Cancer Paternal Uncle     Cancer Cousin         pancreatic     Social History     Tobacco Use    Smoking status: Never Smoker    Smokeless tobacco: Never Used   Substance Use Topics    Alcohol use: No     Frequency: Never    Drug use: No     Review of Systems   Constitutional: Positive for appetite change, fatigue and unexpected weight change. Negative for activity change.   HENT: Negative for facial swelling.    Eyes: Negative for pain.   Respiratory: Negative for shortness of breath.    Cardiovascular: Positive for leg swelling. Negative for chest pain.   Gastrointestinal: Negative for abdominal pain.   Genitourinary: Negative for dysuria.   Musculoskeletal: Negative for arthralgias and back pain.   Skin: Positive for pallor. Negative for color change.   Neurological: Negative for dizziness and numbness.   Psychiatric/Behavioral: Negative for confusion.   All other systems reviewed and are negative.      Physical Exam     Initial Vitals [05/03/19 1647]   BP Pulse Resp Temp SpO2   134/73 85 18 98.8 °F (37.1 °C) 100 %      MAP       --         Physical Exam    Nursing note and vitals reviewed.  Constitutional: Vital signs are normal. She appears well-developed and well-nourished.   HENT:   Head: Normocephalic and atraumatic.   Eyes: Conjunctivae, EOM and lids are normal. Pupils are equal, round, and reactive to light.   Neck: Normal range of motion. Neck supple.   Cardiovascular: Normal rate, regular rhythm, S1 normal, S2 normal, normal heart sounds, intact distal pulses and normal pulses.   Pulmonary/Chest:   Faint crackles on auscultation b/l lower lobes   Abdominal: Soft. Normal appearance and bowel sounds are normal.   Musculoskeletal: Normal range of motion.   Neurological: She is alert and oriented to person, place, and time. No cranial nerve deficit or sensory deficit.   Skin: Skin is warm and dry. Capillary refill takes less than 2 seconds.   Psychiatric: She has a normal mood and affect.  Her speech is normal and behavior is normal. Judgment normal.             Roopa Hanley presents with clinical signs and symptoms that correlate but is not limited to dehydration,anemia of chronic disease,pneumonia,metabolic derangement,UTI     I performed a detailed H&P,ordered basic labs,EKG,CXR,lactic acid,phenergan,IVF bolus,Coags and placed the pt on cardiac monitor.  Ayush Astudillo M.D  Emergency Medicine PGY-4  7:44 PM     UPDATE    EKG did not reveal any signs of an acute ischemic cardiac event.She reported an improvement in symptoms after the administration of fluids and electrolyte supplementation ( 2G Mg,2G Ca,90MeqK). I will discharge the pt at this time with strict return precautions and instructions to follow up with a PCP for repeat lab draw.  Ayush Astudillo MD    ED Course   Procedures  Labs Reviewed   PROTIME-INR - Abnormal; Notable for the following components:       Result Value    Prothrombin Time 15.1 (*)     INR 1.5 (*)     All other components within normal limits   CBC W/ AUTO DIFFERENTIAL - Abnormal; Notable for the following components:    WBC 1.68 (*)     RBC 2.63 (*)     Hemoglobin 7.5 (*)     Hematocrit 22.5 (*)     RDW 17.2 (*)     Platelets 115 (*)     nRBC 10 (*)     Platelet Estimate Decreased (*)     All other components within normal limits    Narrative:     WBC critical result(s) called and verbal readback obtained from DAVE JOHNSON RN, 05/03/2019 21:01   COMPREHENSIVE METABOLIC PANEL - Abnormal; Notable for the following components:    Potassium 2.7 (*)     Glucose 133 (*)     Calcium 8.5 (*)     Total Protein 5.5 (*)     Albumin 2.1 (*)     Total Bilirubin 1.3 (*)     Alkaline Phosphatase 136 (*)     AST 73 (*)     ALT 55 (*)     eGFR if non  58.8 (*)     All other components within normal limits   MAGNESIUM - Abnormal; Notable for the following components:    Magnesium 1.2 (*)     All other components within normal limits   BASIC METABOLIC  "PANEL - Abnormal; Notable for the following components:    Potassium 2.9 (*)     Glucose 115 (*)     Calcium 8.3 (*)     eGFR if non  58.8 (*)     All other components within normal limits    Narrative:     Draw after administration of electrolytes   APTT   LACTIC ACID, PLASMA   URINALYSIS, REFLEX TO URINE CULTURE          Imaging Results          X-Ray Chest PA And Lateral (Final result)  Result time 05/03/19 20:19:32    Final result by Yaron Cade MD (05/03/19 20:19:32)                 Impression:      No acute cardiopulmonary finding or detrimental change when compared with 04/13/2019.      Electronically signed by: Yaron Cade MD  Date:    05/03/2019  Time:    20:19             Narrative:    EXAMINATION:  XR CHEST PA AND LATERAL    CLINICAL HISTORY:  Provided history is "  Other fatigue".    TECHNIQUE:  Frontal and lateral views of the chest were performed.    COMPARISON:  04/13/2019.    FINDINGS:  Cardiac wires overlie the chest.  Right-sided Port-A-Cath overlies the lower SVC.  Cardiac silhouette is not enlarged.  Right hemidiaphragm is elevated, similar to prior.  No focal consolidation.  No sizable pleural effusion.  No pneumothorax.  Partially visualized stent material in the upper abdomen.                                                      Clinical Impression:       ICD-10-CM ICD-9-CM   1. Electrolyte abnormality E87.8 276.9   2. Malaise and fatigue R53.81 780.79    R53.83    3. Fatigue R53.83 780.79                                Ayush Astudillo MD  Resident  05/04/19 0012    "

## 2019-05-07 ENCOUNTER — INFUSION (OUTPATIENT)
Dept: INFUSION THERAPY | Facility: HOSPITAL | Age: 69
End: 2019-05-07
Attending: INTERNAL MEDICINE
Payer: COMMERCIAL

## 2019-05-07 ENCOUNTER — OFFICE VISIT (OUTPATIENT)
Dept: HEMATOLOGY/ONCOLOGY | Facility: CLINIC | Age: 69
End: 2019-05-07
Payer: COMMERCIAL

## 2019-05-07 VITALS
TEMPERATURE: 99 F | BODY MASS INDEX: 25.33 KG/M2 | SYSTOLIC BLOOD PRESSURE: 138 MMHG | HEIGHT: 63 IN | HEART RATE: 88 BPM | DIASTOLIC BLOOD PRESSURE: 76 MMHG

## 2019-05-07 VITALS
TEMPERATURE: 98 F | BODY MASS INDEX: 25.32 KG/M2 | HEIGHT: 63 IN | HEART RATE: 93 BPM | RESPIRATION RATE: 18 BRPM | DIASTOLIC BLOOD PRESSURE: 77 MMHG | SYSTOLIC BLOOD PRESSURE: 128 MMHG | WEIGHT: 142.88 LBS

## 2019-05-07 DIAGNOSIS — R11.10: ICD-10-CM

## 2019-05-07 DIAGNOSIS — D63.0 ANEMIA IN NEOPLASTIC DISEASE: ICD-10-CM

## 2019-05-07 DIAGNOSIS — C25.9 PANCREATIC ADENOCARCINOMA: Primary | ICD-10-CM

## 2019-05-07 DIAGNOSIS — G89.3 CANCER ASSOCIATED PAIN: ICD-10-CM

## 2019-05-07 DIAGNOSIS — C25.9 PANCREATIC ADENOCARCINOMA: ICD-10-CM

## 2019-05-07 DIAGNOSIS — R53.0 NEOPLASTIC MALIGNANT RELATED FATIGUE: Primary | ICD-10-CM

## 2019-05-07 DIAGNOSIS — Z98.890: ICD-10-CM

## 2019-05-07 DIAGNOSIS — I82.412 ACUTE DEEP VEIN THROMBOSIS (DVT) OF FEMORAL VEIN OF LEFT LOWER EXTREMITY: ICD-10-CM

## 2019-05-07 DIAGNOSIS — R63.0 ANOREXIA: ICD-10-CM

## 2019-05-07 DIAGNOSIS — I10 ESSENTIAL HYPERTENSION: ICD-10-CM

## 2019-05-07 DIAGNOSIS — K31.5 DUODENAL STENOSIS: ICD-10-CM

## 2019-05-07 DIAGNOSIS — R63.4 WEIGHT LOSS: ICD-10-CM

## 2019-05-07 LAB
ALBUMIN SERPL BCP-MCNC: 2.4 G/DL (ref 3.5–5.2)
ALP SERPL-CCNC: 161 U/L (ref 55–135)
ALT SERPL W/O P-5'-P-CCNC: 51 U/L (ref 10–44)
ANION GAP SERPL CALC-SCNC: 7 MMOL/L (ref 8–16)
AST SERPL-CCNC: 73 U/L (ref 10–40)
BASOPHILS # BLD AUTO: 0.02 K/UL (ref 0–0.2)
BASOPHILS NFR BLD: 0.4 % (ref 0–1.9)
BILIRUB SERPL-MCNC: 1.6 MG/DL (ref 0.1–1)
BUN SERPL-MCNC: 13 MG/DL (ref 8–23)
CALCIUM SERPL-MCNC: 8.6 MG/DL (ref 8.7–10.5)
CHLORIDE SERPL-SCNC: 106 MMOL/L (ref 95–110)
CO2 SERPL-SCNC: 23 MMOL/L (ref 23–29)
CREAT SERPL-MCNC: 0.9 MG/DL (ref 0.5–1.4)
DIFFERENTIAL METHOD: ABNORMAL
EOSINOPHIL # BLD AUTO: 0 K/UL (ref 0–0.5)
EOSINOPHIL NFR BLD: 0.5 % (ref 0–8)
ERYTHROCYTE [DISTWIDTH] IN BLOOD BY AUTOMATED COUNT: 19 % (ref 11.5–14.5)
EST. GFR  (AFRICAN AMERICAN): >60 ML/MIN/1.73 M^2
EST. GFR  (NON AFRICAN AMERICAN): >60 ML/MIN/1.73 M^2
GLUCOSE SERPL-MCNC: 131 MG/DL (ref 70–110)
HCT VFR BLD AUTO: 26 % (ref 37–48.5)
HGB BLD-MCNC: 8.4 G/DL (ref 12–16)
IMM GRANULOCYTES # BLD AUTO: 0.12 K/UL (ref 0–0.04)
IMM GRANULOCYTES NFR BLD AUTO: 2.2 % (ref 0–0.5)
LYMPHOCYTES # BLD AUTO: 0.8 K/UL (ref 1–4.8)
LYMPHOCYTES NFR BLD: 14.1 % (ref 18–48)
MCH RBC QN AUTO: 29.6 PG (ref 27–31)
MCHC RBC AUTO-ENTMCNC: 32.3 G/DL (ref 32–36)
MCV RBC AUTO: 92 FL (ref 82–98)
MONOCYTES # BLD AUTO: 0.8 K/UL (ref 0.3–1)
MONOCYTES NFR BLD: 15 % (ref 4–15)
NEUTROPHILS # BLD AUTO: 3.8 K/UL (ref 1.8–7.7)
NEUTROPHILS NFR BLD: 67.8 % (ref 38–73)
NRBC BLD-RTO: 1 /100 WBC
PLATELET # BLD AUTO: 207 K/UL (ref 150–350)
PMV BLD AUTO: 11.7 FL (ref 9.2–12.9)
POTASSIUM SERPL-SCNC: 4.3 MMOL/L (ref 3.5–5.1)
PROT SERPL-MCNC: 5.6 G/DL (ref 6–8.4)
RBC # BLD AUTO: 2.84 M/UL (ref 4–5.4)
SODIUM SERPL-SCNC: 136 MMOL/L (ref 136–145)
WBC # BLD AUTO: 5.55 K/UL (ref 3.9–12.7)

## 2019-05-07 PROCEDURE — 96417 CHEMO IV INFUS EACH ADDL SEQ: CPT

## 2019-05-07 PROCEDURE — 99999 PR PBB SHADOW E&M-EST. PATIENT-LVL III: CPT | Mod: PBBFAC,,, | Performed by: INTERNAL MEDICINE

## 2019-05-07 PROCEDURE — 3078F PR MOST RECENT DIASTOLIC BLOOD PRESSURE < 80 MM HG: ICD-10-PCS | Mod: CPTII,S$GLB,, | Performed by: INTERNAL MEDICINE

## 2019-05-07 PROCEDURE — 96413 CHEMO IV INFUSION 1 HR: CPT

## 2019-05-07 PROCEDURE — A4216 STERILE WATER/SALINE, 10 ML: HCPCS | Performed by: INTERNAL MEDICINE

## 2019-05-07 PROCEDURE — 3075F SYST BP GE 130 - 139MM HG: CPT | Mod: CPTII,S$GLB,, | Performed by: INTERNAL MEDICINE

## 2019-05-07 PROCEDURE — 3075F PR MOST RECENT SYSTOLIC BLOOD PRESS GE 130-139MM HG: ICD-10-PCS | Mod: CPTII,S$GLB,, | Performed by: INTERNAL MEDICINE

## 2019-05-07 PROCEDURE — 1101F PR PT FALLS ASSESS DOC 0-1 FALLS W/OUT INJ PAST YR: ICD-10-PCS | Mod: CPTII,S$GLB,, | Performed by: INTERNAL MEDICINE

## 2019-05-07 PROCEDURE — 63600175 PHARM REV CODE 636 W HCPCS: Performed by: INTERNAL MEDICINE

## 2019-05-07 PROCEDURE — 80053 COMPREHEN METABOLIC PANEL: CPT

## 2019-05-07 PROCEDURE — 85025 COMPLETE CBC W/AUTO DIFF WBC: CPT

## 2019-05-07 PROCEDURE — 99214 OFFICE O/P EST MOD 30 MIN: CPT | Mod: S$GLB,,, | Performed by: INTERNAL MEDICINE

## 2019-05-07 PROCEDURE — 1101F PT FALLS ASSESS-DOCD LE1/YR: CPT | Mod: CPTII,S$GLB,, | Performed by: INTERNAL MEDICINE

## 2019-05-07 PROCEDURE — 99999 PR PBB SHADOW E&M-EST. PATIENT-LVL III: ICD-10-PCS | Mod: PBBFAC,,, | Performed by: INTERNAL MEDICINE

## 2019-05-07 PROCEDURE — 99214 PR OFFICE/OUTPT VISIT, EST, LEVL IV, 30-39 MIN: ICD-10-PCS | Mod: S$GLB,,, | Performed by: INTERNAL MEDICINE

## 2019-05-07 PROCEDURE — 96367 TX/PROPH/DG ADDL SEQ IV INF: CPT

## 2019-05-07 PROCEDURE — 25000003 PHARM REV CODE 250: Performed by: INTERNAL MEDICINE

## 2019-05-07 PROCEDURE — 3078F DIAST BP <80 MM HG: CPT | Mod: CPTII,S$GLB,, | Performed by: INTERNAL MEDICINE

## 2019-05-07 RX ORDER — SODIUM CHLORIDE 0.9 % (FLUSH) 0.9 %
10 SYRINGE (ML) INJECTION
Status: DISCONTINUED | OUTPATIENT
Start: 2019-05-07 | End: 2019-05-07 | Stop reason: HOSPADM

## 2019-05-07 RX ORDER — SODIUM CHLORIDE 0.9 % (FLUSH) 0.9 %
10 SYRINGE (ML) INJECTION
Status: COMPLETED | OUTPATIENT
Start: 2019-05-07 | End: 2019-05-07

## 2019-05-07 RX ORDER — SODIUM CHLORIDE 0.9 % (FLUSH) 0.9 %
10 SYRINGE (ML) INJECTION
Status: CANCELLED | OUTPATIENT
Start: 2019-05-07

## 2019-05-07 RX ORDER — DEXAMETHASONE 2 MG/1
2 TABLET ORAL 2 TIMES DAILY WITH MEALS
Qty: 60 TABLET | Refills: 2 | Status: SHIPPED | OUTPATIENT
Start: 2019-05-07

## 2019-05-07 RX ORDER — HEPARIN 100 UNIT/ML
500 SYRINGE INTRAVENOUS
Status: CANCELLED | OUTPATIENT
Start: 2019-05-07

## 2019-05-07 RX ORDER — HEPARIN 100 UNIT/ML
500 SYRINGE INTRAVENOUS
Status: COMPLETED | OUTPATIENT
Start: 2019-05-07 | End: 2019-05-07

## 2019-05-07 RX ORDER — DIPHENOXYLATE HYDROCHLORIDE AND ATROPINE SULFATE 2.5; .025 MG/1; MG/1
1 TABLET ORAL 4 TIMES DAILY PRN
Qty: 30 TABLET | Refills: 1 | Status: ON HOLD | OUTPATIENT
Start: 2019-05-07 | End: 2019-05-22 | Stop reason: SDUPTHER

## 2019-05-07 RX ORDER — HEPARIN 100 UNIT/ML
500 SYRINGE INTRAVENOUS
Status: DISCONTINUED | OUTPATIENT
Start: 2019-05-07 | End: 2019-05-07 | Stop reason: HOSPADM

## 2019-05-07 RX ADMIN — Medication 10 ML: at 02:05

## 2019-05-07 RX ADMIN — HEPARIN 500 UNITS: 100 SYRINGE at 02:05

## 2019-05-07 RX ADMIN — GRANISETRON HYDROCHLORIDE 1 MG: 0.1 INJECTION, SOLUTION INTRAVENOUS at 01:05

## 2019-05-07 RX ADMIN — SODIUM CHLORIDE: 0.9 INJECTION, SOLUTION INTRAVENOUS at 01:05

## 2019-05-07 RX ADMIN — PACLITAXEL 220 MG: 100 INJECTION, POWDER, LYOPHILIZED, FOR SUSPENSION INTRAVENOUS at 01:05

## 2019-05-07 RX ADMIN — Medication 10 ML: at 10:05

## 2019-05-07 RX ADMIN — GEMCITABINE HYDROCHLORIDE 1740 MG: 200 INJECTION, SOLUTION INTRAVENOUS at 02:05

## 2019-05-07 RX ADMIN — HEPARIN 500 UNITS: 100 SYRINGE at 10:05

## 2019-05-07 NOTE — PROGRESS NOTES
CC: Pancreatic adenocarcinoma, follow up      Oncology History        Diagnosis: Adenocarcinoma pancreas  Stage : Borderline resectable  Treatment: 2/26/19 - 3/26/19: 2 cycles neoadjuvant FOLFIRINOX (without bolus 5 FU)        HPI:  is a 67y/o lady with recently diagnosed pancreatic cancer. She has hypertension.  She has history of flank/ back pain for over a year. She describes this as constant, achy, progressively worsening . This had impaired her ADLs and interrupted her sleep. She has recent weight loss--15lb since Dec 2018. She has decreased appetite. She had also noticed change in stool to light color. She had colonoscopy on 1/8/2019, suffered complications. She was told she had perforated an ulcer. She was admitted to [facility]  for 9 days. CT scan done with oral contrast via NG tube r/t poor renal function. She was discharged 1/16/19.   She was told she had a pancreatic mass. She was evaluated here by . She is still not eating well.      Interval history:  She is here for a follow up. She is still not eating well. She continues to have nausea, abdominal pain. She finished 1 cycle of FOLFIRINOX on 2/26/19. She had nausea, emesis and diarrhea after C1.C2 was on 3/26/19.She continued to have severe nausea, vomiting, diarrhea. Chemotherapy changed to Gemcitabine-Nab Paclitaxel; from 4/23/19.    Review of Systems   Constitutional: Positive for malaise/fatigue and weight loss. Negative for chills and fever.   HENT: Negative for congestion, ear discharge, ear pain and nosebleeds.    Eyes: Negative for blurred vision, double vision and photophobia.   Respiratory: Negative for cough, hemoptysis, sputum production and shortness of breath.    Cardiovascular: Negative for palpitations, orthopnea and claudication.   Gastrointestinal: Positive for abdominal pain, diarrhea, nausea and vomiting. Negative for heartburn.   Genitourinary: Negative for dysuria and frequency.   Musculoskeletal: Negative for  back pain, myalgias and neck pain.   Neurological: Negative for tingling, sensory change, speech change and headaches.   Endo/Heme/Allergies: Negative for environmental allergies. Does not bruise/bleed easily.   Psychiatric/Behavioral: Negative for hallucinations, substance abuse and suicidal ideas.                 Current Outpatient Medications   Medication Sig    apixaban (ELIQUIS) 5 mg (74 tabs) DsPk For the first 7 days take two 5 mg tablets twice daily.  After 7 days take one 5 mg tablet twice daily.    bisoprolol-hydrochlorothiazide (ZIAC) 10-6.25 mg per tablet Take 1 tablet by mouth 2 (two) times daily.    dexamethasone (DECADRON) 2 MG tablet Take 1 tablet (2 mg total) by mouth 2 (two) times daily with meals.    diphenoxylate-atropine 2.5-0.025 mg (LOMOTIL) 2.5-0.025 mg per tablet Take 1 tablet by mouth 4 (four) times daily as needed for Diarrhea.    dronabinol (MARINOL) 5 MG capsule Take 1 capsule (5 mg total) by mouth 2 (two) times daily before meals.    hydrALAZINE (APRESOLINE) 50 MG tablet Take 75 mg by mouth 3 (three) times daily.    loperamide (IMODIUM) 2 mg capsule Take 2 mg by mouth 4 (four) times daily as needed for Diarrhea.    LORazepam (ATIVAN) 1 MG tablet Take 1 mg by mouth every 8 (eight) hours as needed for Anxiety.    morphine (MS CONTIN) 15 MG 12 hr tablet Take 1 tablet (15 mg total) by mouth 2 (two) times daily.    morphine (MSIR) 15 MG tablet Take 2 tablets (30 mg total) by mouth every 4 (four) hours as needed for Pain.    ondansetron (ZOFRAN) 8 MG tablet TAKE 1 TABLET(8 MG) BY MOUTH EVERY 8 HOURS AS NEEDED FOR NAUSEA    pantoprazole (PROTONIX) 40 MG tablet Take 1 tablet (40 mg total) by mouth once daily.    potassium chloride SA (K-DUR,KLOR-CON) 20 MEQ tablet Take 2 tablets (40 mEq total) by mouth 2 (two) times daily.    promethazine (PHENERGAN) 25 MG tablet TAKE 1 TABLET(25 MG) BY MOUTH EVERY 6 HOURS AS NEEDED FOR NAUSEA    lipase-protease-amylase 24,000-76,000-120,000  units (CREON) 24,000-76,000 -120,000 unit capsule Take 1 capsule by mouth 3 (three) times daily with meals.     Current Facility-Administered Medications   Medication    potassium chloride CR tablet 40 mEq       Vitals:    05/07/19 1148   BP: 138/76   Pulse: 88   Temp: 99 °F (37.2 °C)       Physical Exam   Constitutional: She is oriented to person, place, and time. She appears well-developed. She appears distressed.   HENT:   Head: Normocephalic and atraumatic.   Eyes: Pupils are equal, round, and reactive to light. No scleral icterus.   Neck: Normal range of motion.   Cardiovascular: Normal rate and regular rhythm.   Murmur heard.  Pulmonary/Chest: Effort normal and breath sounds normal. No respiratory distress. She has no wheezes. She has no rales.   Abdominal: Soft. Bowel sounds are normal. She exhibits no distension. There is no tenderness. There is no rebound.   Musculoskeletal: She exhibits no edema.   Neurological: She is alert and oriented to person, place, and time.   Skin: Skin is warm.   Psychiatric: She has a normal mood and affect.     1/21/19 CT chest, abdomen and pelvis with cont             FINDINGS:  The soft tissues at the base of the neck are unremarkable.  The thyroid gland is normal in size and configuration.  Hypodensity in the right lateral aspect of the thyroid isthmus measures 0.7 cm.  There is no abnormal lymph node enlargement.    There is no axillary, mediastinal, or hilar lymphadenopathy.  The hilar contours are unremarkable.  The esophagus is normal in course and contour.    The thoracic aorta is normal in course, caliber, and contour without significant atherosclerotic calcifications.The heart does not appear enlarged and there is no pericardial effusion.    The trachea and proximal airways are patent.  The lungs are symmetrically expanded and demonstrate no convincing evidence of consolidation, pleural thickening, pneumothorax, or pleural fluid.  Right hemidiaphragmatic  elevation.    The liver is prominent in size measuring 18.1 cm in craniocaudal dimension and slightly decreased in attenuation reflecting hepatic steatosis.  There are several hypodensities in the right hepatic lobe at the periphery, largest measuring 1.4 cm, which are favored to represent cysts.  The gallbladder is distended without evidence of calcified gallstones, or wall thickening, or character pericholecystic fluid.  There is hepatic subcapsular fluid collection posterior to the gallbladder which measures approximately 3.3 x 1.2 cm.  There is marked intra and extrahepatic biliary ductal dilatation with the common bile duct measuring up to 1.6 cm in transverse dimension.    There is an ill-defined hypodense solid lesion in the head of the pancreas measuring 4.3 x 4.5 x 4.4 cm (AP x TV x CC; axial series 3, image 114 and coronal series 601, image 52) with associated pancreatic ductal dilatation with the pancreatic duct measuring up to 1.1 cm.  Assessment of the portal vein, splenic vein, SMV demonstrates pancreatic mass surrounding the superior mesenteric vein (greater than 180 degrees), also vessel contour producing concentric narrowing of the lumen consistent with vascular encasement.  The IVC and celiac and superior mesenteric arteries are not involved.    The stomach, spleen, pancreas, and adrenal glands are unremarkable.    The kidneys are normal in size and location.  Bilateral renal hypodensities too small to fully characterize, though may represent cysts.  There is no evidence of hydronephrosis.  The ureters appear normal in course and caliber without evidence of ureteral dilatation. The urinary bladder demonstrates no significant abnormality. Operative change of prior hysterectomy with small approximately 2 cm cystic lesion in the left adnexa likely an ovarian cyst.    The abdominal aorta is normal in course and caliber without significant atherosclerotic calcifications.    The visualized loops of small  bowel show no evidence of obstruction or inflammation. Large bowel is unremarkable.  High density material in the appendix, likely an appendicolith.  There is no intraperitoneal free air.  Mild pelvic ascites.  There is no evidence of lymph node enlargement in the abdomen or pelvis.    When viewed with bone windows the osseous structures are unremarkable.  Fat containing umbilical hernia.       Impression         Ill-defined hypodense solid lesion of the head the pancreas concerning for pancreatic adenocarcinoma with associated intrahepatic and extrahepatic biliary ductal dilatation, pancreatic ductal dilatation and associated encasement of the superior mesenteric vein as detailed above.  There are multiple normal sized regional lymph nodes.    Left adnexal hypodensity, likely a cyst.  This can be followed with routine oncologic surveillance.    Additional findings of hypodense right thyroid nodule, hepatomegaly with hepatic steatosis, right hepatic cysts, bilateral renal cysts, and umbilical hernia.    RECIST SUMMARY:    Date of prior examination for comparison: Baseline study    Lesion 1: Pancreatic head mass.  4.5 cm. Series 2 image 138.  New lesion.                  1/25/19 EGD/EUS           ENDOSCOPIC FINDING: :       There is no endoscopic evidence of bezoar/food (residue) or fluid collections in the entire examined stomach (stomach appears to be  emptying).       An acquired extrinsic severe stenosis was found in the first portion of the duodenum and was traversed (with the EGD scope, but could not  be traversed with the EUS scope).       ENDOSONOGRAPHIC FINDING: :       The esophagus, stomach and duodenum and adjacent structures were visualized endosonographically. Endosonographic imaging in the visualized portion of the liver showed no lesion.       A mass was identified in the pancreatic head. The mass was  hypoechoic. The mass measured 41 mm by 35 mm in maximal  cross-sectional diameter. The outer margins  were irregular. There  was sonographic evidence suggesting invasion into the superior        mesenteric vein (manifested by encasement) and peripancreatic fat. The remainder of the pancreas was examined. The endosonographic appearance of parenchyma and the upstream pancreatic duct indicated        duct dilation, a tortuous/ectatic duct and parenchymal atrophy.    Impression:           - Acquired duodenal stenosis.                        - A mass was identified in the pancreatic head. This was staged T3 Nx Mx by endosonographic  criteria. The staging applies if malignancy is confirmed.                        - No specimens collected.  Recommendation:       - Discharge patient to home (ambulatory).                        - Resume previous diet; Discharge to home                         (ambulatory); Resume outpatient medications        2/1/19 ERCP        Findings:      ENDOSCOPIC  FINDING:The examined esophagus was endoscopically normal. The entire examined stomach was endoscopically normal. The examined duodenum was endoscopically normal. A small amount of food (residue) was  found in the gastric antrum. An acquired malignant-appearing, intrinsic severe stenosis was found in the first portion of the duodenum and was non-traversed.      ENDOSONOGRAPHIC FINDING:The esophagus stomach and duodenum and adjacent structures were visualized endosonographically. A mass was identified in the pancreatic head. There was dilation in the common hepatic duct which measured up to 15 mm. The decision was made to make a bile duct  puncture into the common bile duct from the duodenal bulb. Under EUS guidance a 19guage needle was used to puncture into the  Common hepatic duct. 20cc's of dark bile was aspirated. A cholangiogram was performed through the 19 g needle,       A guidewire was advanced through the EUS needle and into the intrahepatic biliary tree. A cautery-tipped, lumen-apposing stent delivery system was advanced across the  transduodenal puncture site and into the biliary system using cautery. A 10x 10mm lumen-apposing stent ("[employer] was placed into the biliary tree, with one end in the extrahepatic duct and the other in the duodenal. Good drainage of bile and contrast was noted post stent placement. The wire remained in place and a 5fr 5cm double pigtail stent was placed across the LAMSthe stent was seen to pigtail int he common hepatic duct and in the duodenal bulb.       After succesful biliary drainage, the duodenal stricture was stented  with a 22 mm x 12 cm WallFlex stent under fluoroscopic guidance.    Impression:           Succesful palliation of malignant biliary and gastric obstruction with choledochoduodenstomy and duodenal stent placement           2/8/19 PET CT             COMPARISON:  CT chest abdomen pelvis with contrast 01/21/2019    FINDINGS:  Quality of the study: Adequate.    There is redemonstration of an ill-defined pancreatic head mass which demonstrates hypermetabolic activity (SUV max 5.78).  This mass measures at least 4.7 x 4.3 cm with distal pancreatic duct dilation.  When compared to CT examination dated 01/21/2019, there has been interval placement of a duodenal stent as well as choledochojejunostomy with stent placement.  There is moderate pneumobilia throughout both left and right hepatic lobes.    There is a small focus of hypermetabolic soft tissue nodularity along the proximal sigmoid colon best appreciated on axial CT image 205.  This finding may relate to physiologic bowel activity, however per chart review this patient underwent complicated colonoscopy and inflammation of the site of complication may present similarly.  Additionally, neoplasia including 2nd synchronous primary or metastatic disease cannot be entirely excluded.    Index lesions:    *Pancreatic head mass: SUV max 5.78 (axial fused CT image 140).  *Sigmoid colon: SUV max 9.72 (axial fused CT image 205).  Physiologic uptake of the tracer  is present within the brain, salivary glands, myocardium, GI and  tracts.    Incidental CT findings: Noncontrast CT demonstrates no acute abnormality.       Impression         Hypermetabolic pancreatic head mass consistent with patient's known pancreatic adenocarcinoma.    Focal, hypermetabolic soft tissue thickening along the proximal sigmoid colon may relate to physiologic bowel activity or inflammation from recent colonoscopy complication, however neoplasia including 2nd synchronous primary or metastatic disease cannot be entirely excluded.      4/10/19 left LE venous doppler       Report Summary:  Impression:   Left:Color flow duplex exam reveals only partly compressible CFV and popliteal vein with intraluminal echoes noted. Venous flow is continuous suggesting possible proximal venous obstruction also. These findings are consistent with age indeterminate deep   vein thrombosis of the left lower extremity.   Right: CFV patent.        Component      Latest Ref Rng & Units 5/7/2019   WBC      3.90 - 12.70 K/uL 5.55   RBC      4.00 - 5.40 M/uL 2.84 (L)   Hemoglobin      12.0 - 16.0 g/dL 8.4 (L)   Hematocrit      37.0 - 48.5 % 26.0 (L)   MCV      82 - 98 fL 92   MCH      27.0 - 31.0 pg 29.6   MCHC      32.0 - 36.0 g/dL 32.3   RDW      11.5 - 14.5 % 19.0 (H)   Platelets      150 - 350 K/uL 207   MPV      9.2 - 12.9 fL 11.7   Immature Granulocytes      0.0 - 0.5 % 2.2 (H)   Gran # (ANC)      1.8 - 7.7 K/uL 3.8   Immature Grans (Abs)      0.00 - 0.04 K/uL 0.12 (H)   Lymph #      1.0 - 4.8 K/uL 0.8 (L)   Mono #      0.3 - 1.0 K/uL 0.8   Eos #      0.0 - 0.5 K/uL 0.0   Baso #      0.00 - 0.20 K/uL 0.02   nRBC      0 /100 WBC 1 (A)   Gran%      38.0 - 73.0 % 67.8   Lymph%      18.0 - 48.0 % 14.1 (L)   Mono%      4.0 - 15.0 % 15.0   Eosinophil%      0.0 - 8.0 % 0.5   Basophil%      0.0 - 1.9 % 0.4   Differential Method       Automated   Sodium      136 - 145 mmol/L 136   Potassium      3.5 - 5.1 mmol/L 4.3   Chloride       95 - 110 mmol/L 106   CO2      23 - 29 mmol/L 23   Glucose      70 - 110 mg/dL 131 (H)   BUN, Bld      8 - 23 mg/dL 13   Creatinine      0.5 - 1.4 mg/dL 0.9   Calcium      8.7 - 10.5 mg/dL 8.6 (L)   PROTEIN TOTAL      6.0 - 8.4 g/dL 5.6 (L)   Albumin      3.5 - 5.2 g/dL 2.4 (L)   BILIRUBIN TOTAL      0.1 - 1.0 mg/dL 1.6 (H)   Alkaline Phosphatase      55 - 135 U/L 161 (H)   AST      10 - 40 U/L 73 (H)   ALT      10 - 44 U/L 51 (H)   Anion Gap      8 - 16 mmol/L 7 (L)   eGFR if African American      >60 mL/min/1.73 m:2 >60.0   eGFR if non African American      >60 mL/min/1.73 m:2 >60.0        Assessment:     1. Adenocarcinoma of head of pancreas  2. Malignant stricture of duodenum  3. Biliary obstruction  4. Jaundice  5. Weight loss  6. Anemia secondary to chemotherapy  7. Elevated CA 19-9  8. Anorexia  9. Nausea and vomiting  10. Cancer associated pain  11. Hyperglycemia  12. Diarrhea secondary to chemotherapy  13. Left LE DVT  14. Abnormal LFTs        Plan:        1,2,3,4: She is s/p biliary and duodenal stents. Her pancreatic cancer appears to be borderline resectable ( > 180 degree encasement of SMV). No apparent arterial involvement. Case was discussed at upper GI conference.    She has been evaluated by surgical oncology. No evidence of metastatic disease on PET CT on 2/8/19. I discussed siddhartha adjuvant chemotherapy with FOLFIRINOX in detail and explained that it is indicated at this time to debulk the tumor and possibly proceed to surgery eventually. She started C1 FOLFIRINOX without 5FU bolus, on 2/26/19.  She had severe emesis, nasuea, diarrhea for several days, well into the 2nd week after her initial chemotherapy.   She had similar problems after C2 FOLFIRINOX. She has tolerated the treatments poorly, despite anti-diarrheal, anti-emetic and other supportive care measures. She has lost more weight and has been eating poorly.  She stopped FOLFIRINOX and started gemcitabine-nab Paclitaxel on 4/23/19. She  tolerate dit better, but still has persistent nausea, weakness, poor oral intake.      5.8: Secondary to #1. She was evaluated by nutrition here. She is on Marinol. I emphasized the need to maintain calorie count, and maintain adequate calorie intake. She cannot tolerate ENSURE/BOOST. She is looking for lactose free versions.       6. Hgb 8.4 today.       9. On Zofran PRN. She also takes Phenergan and on Decadron 2mg BID from 4/8/19. Will repeat CT, and also consider repeating EGD.      10. On Morphine extended release and IR PRN      11. Possibly secondary to pancreatic insufficiency. No known diagnosis of DM previously.      12. She is on Imodium, PRN Lomotil.C diff negative on 4/8/19. She is on CREON. She will receive iv fluids weekly or as needed.        13. On Apixaban    14.Possibly secondary to chemotherapy. Grade 1. Will monitor.            Distress Screening Results: Psychosocial Distress screening score of Distress Score: 10 noted and reviewed. No intervention indicated.

## 2019-05-07 NOTE — NURSING
Patient here for blood draw from right chest port-accesses easily with good blood return-blood to lab-line flushed and left accessed for possible chemo today-tolerated well.

## 2019-05-07 NOTE — Clinical Note
-chemo today-cmp, mag,iv fluids on 5/10-fluids on 5/14-cbc, cmp, ca 19-9, mag,ct chest, abdomen, pelvi swith cont, f/u for chemo on 5/21

## 2019-05-07 NOTE — PLAN OF CARE
Problem: Adult Inpatient Plan of Care  Goal: Plan of Care Review  Outcome: Ongoing (interventions implemented as appropriate)  Did well with chemo today.

## 2019-05-08 ENCOUNTER — TELEPHONE (OUTPATIENT)
Dept: ENDOSCOPY | Facility: HOSPITAL | Age: 69
End: 2019-05-08

## 2019-05-08 DIAGNOSIS — R11.0 NAUSEA: Primary | ICD-10-CM

## 2019-05-08 NOTE — TELEPHONE ENCOUNTER
----- Message from Rashawn Martinez MD sent at 5/8/2019  9:40 AM CDT -----  No problem. Sometimes there is tissue ingrowth, and a 2nd stent is needed.    Noris- Please schedule for EGD under fluoro. 60min case at [facility] [place]. Any AES MD (Dr. Christianson did her last time, so best with him, but can otherwise by with any of us).      ----- Message -----  From: Rigoberto Mcarthur MD  Sent: 5/7/2019  12:15 PM  To: Rashawn Martinez MD    Hi ,  This is a pt with pancreatic cancer, duodenal and biliary stenosis , who had EGD./EUS in Jan 2019. She has recurrent nausea, dysphagia, pain. I was wondering about a repeat EGD for her. Is that something your clinic an schedule?    Thanks,  Lyubov

## 2019-05-09 ENCOUNTER — TELEPHONE (OUTPATIENT)
Dept: ENDOSCOPY | Facility: HOSPITAL | Age: 69
End: 2019-05-09

## 2019-05-10 ENCOUNTER — TELEPHONE (OUTPATIENT)
Dept: INFUSION THERAPY | Facility: HOSPITAL | Age: 69
End: 2019-05-10

## 2019-05-10 DIAGNOSIS — C25.9 MALIGNANT NEOPLASM OF PANCREAS, UNSPECIFIED LOCATION OF MALIGNANCY: ICD-10-CM

## 2019-05-10 RX ORDER — ONDANSETRON HYDROCHLORIDE 8 MG/1
TABLET, FILM COATED ORAL
Qty: 30 TABLET | Refills: 0 | Status: SHIPPED | OUTPATIENT
Start: 2019-05-10 | End: 2019-05-18 | Stop reason: SDUPTHER

## 2019-05-14 ENCOUNTER — TELEPHONE (OUTPATIENT)
Dept: ENDOSCOPY | Facility: HOSPITAL | Age: 69
End: 2019-05-14

## 2019-05-14 ENCOUNTER — INFUSION (OUTPATIENT)
Dept: INFUSION THERAPY | Facility: HOSPITAL | Age: 69
End: 2019-05-14
Attending: INTERNAL MEDICINE
Payer: COMMERCIAL

## 2019-05-14 VITALS
RESPIRATION RATE: 18 BRPM | TEMPERATURE: 98 F | HEART RATE: 82 BPM | DIASTOLIC BLOOD PRESSURE: 60 MMHG | SYSTOLIC BLOOD PRESSURE: 133 MMHG

## 2019-05-14 DIAGNOSIS — C25.9 PANCREATIC ADENOCARCINOMA: Primary | ICD-10-CM

## 2019-05-14 PROCEDURE — 96360 HYDRATION IV INFUSION INIT: CPT

## 2019-05-14 PROCEDURE — 63600175 PHARM REV CODE 636 W HCPCS: Performed by: INTERNAL MEDICINE

## 2019-05-14 PROCEDURE — 25000003 PHARM REV CODE 250: Performed by: INTERNAL MEDICINE

## 2019-05-14 PROCEDURE — A4216 STERILE WATER/SALINE, 10 ML: HCPCS | Performed by: INTERNAL MEDICINE

## 2019-05-14 RX ORDER — HEPARIN 100 UNIT/ML
500 SYRINGE INTRAVENOUS
Status: COMPLETED | OUTPATIENT
Start: 2019-05-14 | End: 2019-05-14

## 2019-05-14 RX ORDER — HEPARIN 100 UNIT/ML
500 SYRINGE INTRAVENOUS
OUTPATIENT
Start: 2019-05-14

## 2019-05-14 RX ORDER — SODIUM CHLORIDE 0.9 % (FLUSH) 0.9 %
10 SYRINGE (ML) INJECTION
Status: COMPLETED | OUTPATIENT
Start: 2019-05-14 | End: 2019-05-14

## 2019-05-14 RX ORDER — SODIUM CHLORIDE 0.9 % (FLUSH) 0.9 %
10 SYRINGE (ML) INJECTION
OUTPATIENT
Start: 2019-05-14

## 2019-05-14 RX ADMIN — SODIUM CHLORIDE: 0.9 INJECTION, SOLUTION INTRAVENOUS at 02:05

## 2019-05-14 RX ADMIN — HEPARIN 500 UNITS: 100 SYRINGE at 03:05

## 2019-05-14 RX ADMIN — Medication 10 ML: at 03:05

## 2019-05-14 NOTE — PLAN OF CARE
Problem: Adult Inpatient Plan of Care  Goal: Plan of Care Review  Outcome: Ongoing (interventions implemented as appropriate)  Infusion completed, pt tolerated well; pt instructed to increase daily hydration w/ water-based fluids; discussed using walker or can at home/away to prevent add'l falls; discussed when to contact MD, when to report to ER; pt declined printed AVS, verbalized understanding of all discussed and when to report next

## 2019-05-14 NOTE — NURSING
"1350  Pt here for 1L NS, accompanied by daughter; reports two falls last week, states "legs gave out", discussed fall prevention, home safety to prevent falls, pt has no other new complaints or concerns; discussed treatment plan for today, all questions answered and pt agrees to proceed  "

## 2019-05-17 ENCOUNTER — PATIENT MESSAGE (OUTPATIENT)
Dept: HEMATOLOGY/ONCOLOGY | Facility: CLINIC | Age: 69
End: 2019-05-17

## 2019-05-17 DIAGNOSIS — C25.9 PANCREATIC ADENOCARCINOMA: Primary | ICD-10-CM

## 2019-05-18 DIAGNOSIS — C25.9 MALIGNANT NEOPLASM OF PANCREAS, UNSPECIFIED LOCATION OF MALIGNANCY: ICD-10-CM

## 2019-05-20 ENCOUNTER — HOSPITAL ENCOUNTER (INPATIENT)
Facility: HOSPITAL | Age: 69
LOS: 3 days | Discharge: HOME | DRG: 871 | End: 2019-05-23
Attending: EMERGENCY MEDICINE | Admitting: INTERNAL MEDICINE
Payer: COMMERCIAL

## 2019-05-20 DIAGNOSIS — E83.42 HYPOMAGNESEMIA: ICD-10-CM

## 2019-05-20 DIAGNOSIS — E87.6 HYPOKALEMIA: ICD-10-CM

## 2019-05-20 DIAGNOSIS — R11.2 NAUSEA AND VOMITING, INTRACTABILITY OF VOMITING NOT SPECIFIED, UNSPECIFIED VOMITING TYPE: ICD-10-CM

## 2019-05-20 DIAGNOSIS — R50.9 FEVER AND CHILLS: ICD-10-CM

## 2019-05-20 DIAGNOSIS — I82.412 ACUTE DEEP VEIN THROMBOSIS (DVT) OF FEMORAL VEIN OF LEFT LOWER EXTREMITY: ICD-10-CM

## 2019-05-20 DIAGNOSIS — R19.7 DIARRHEA, UNSPECIFIED TYPE: ICD-10-CM

## 2019-05-20 DIAGNOSIS — I26.92 ACUTE SADDLE PULMONARY EMBOLISM WITHOUT ACUTE COR PULMONALE: ICD-10-CM

## 2019-05-20 DIAGNOSIS — E87.20 LACTIC ACIDOSIS: ICD-10-CM

## 2019-05-20 DIAGNOSIS — R53.0 NEOPLASTIC MALIGNANT RELATED FATIGUE: ICD-10-CM

## 2019-05-20 DIAGNOSIS — Z51.5 PALLIATIVE CARE ENCOUNTER: ICD-10-CM

## 2019-05-20 DIAGNOSIS — C25.9 PANCREATIC ADENOCARCINOMA: ICD-10-CM

## 2019-05-20 DIAGNOSIS — D63.0 ANEMIA IN NEOPLASTIC DISEASE: ICD-10-CM

## 2019-05-20 DIAGNOSIS — R17 JAUNDICE: ICD-10-CM

## 2019-05-20 DIAGNOSIS — C25.9 MALIGNANT NEOPLASM OF PANCREAS, UNSPECIFIED LOCATION OF MALIGNANCY: Primary | ICD-10-CM

## 2019-05-20 DIAGNOSIS — G89.3 CANCER ASSOCIATED PAIN: ICD-10-CM

## 2019-05-20 DIAGNOSIS — T45.1X5A CHEMOTHERAPY-INDUCED NEUTROPENIA: ICD-10-CM

## 2019-05-20 DIAGNOSIS — K21.9 GASTROESOPHAGEAL REFLUX DISEASE, ESOPHAGITIS PRESENCE NOT SPECIFIED: ICD-10-CM

## 2019-05-20 DIAGNOSIS — D70.1 CHEMOTHERAPY-INDUCED NEUTROPENIA: ICD-10-CM

## 2019-05-20 DIAGNOSIS — I26.99 PULMONARY EMBOLUS: ICD-10-CM

## 2019-05-20 DIAGNOSIS — F41.9 ANXIETY: ICD-10-CM

## 2019-05-20 DIAGNOSIS — N30.80 EMPHYSEMATOUS CYSTITIS: ICD-10-CM

## 2019-05-20 DIAGNOSIS — R06.02 SHORTNESS OF BREATH: ICD-10-CM

## 2019-05-20 DIAGNOSIS — A41.9 SEPSIS: ICD-10-CM

## 2019-05-20 DIAGNOSIS — I26.99 BILATERAL PULMONARY EMBOLISM: ICD-10-CM

## 2019-05-20 DIAGNOSIS — Z71.89 GOALS OF CARE, COUNSELING/DISCUSSION: ICD-10-CM

## 2019-05-20 LAB
ABO + RH BLD: NORMAL
ACANTHOCYTES BLD QL SMEAR: PRESENT
ALBUMIN SERPL BCP-MCNC: 1.7 G/DL (ref 3.5–5.2)
ALBUMIN SERPL BCP-MCNC: 2.1 G/DL (ref 3.5–5.2)
ALLENS TEST: ABNORMAL
ALP SERPL-CCNC: 150 U/L (ref 55–135)
ALP SERPL-CCNC: 182 U/L (ref 55–135)
ALT SERPL W/O P-5'-P-CCNC: 50 U/L (ref 10–44)
ALT SERPL W/O P-5'-P-CCNC: 56 U/L (ref 10–44)
ANION GAP SERPL CALC-SCNC: 14 MMOL/L (ref 8–16)
ANION GAP SERPL CALC-SCNC: 7 MMOL/L (ref 8–16)
ANION GAP SERPL CALC-SCNC: 8 MMOL/L (ref 8–16)
ANION GAP SERPL CALC-SCNC: 8 MMOL/L (ref 8–16)
ANISOCYTOSIS BLD QL SMEAR: ABNORMAL
ANISOCYTOSIS BLD QL SMEAR: ABNORMAL
APTT BLDCRRT: 45.7 SEC (ref 21–32)
ASCENDING AORTA: 2.77 CM
AST SERPL-CCNC: 74 U/L (ref 10–40)
AST SERPL-CCNC: 77 U/L (ref 10–40)
AV INDEX (PROSTH): 0.92
AV MEAN GRADIENT: 3.23 MMHG
AV PEAK GRADIENT: 6.45 MMHG
AV VALVE AREA: 2.81 CM2
AV VELOCITY RATIO: 0.81
BASO STIPL BLD QL SMEAR: ABNORMAL
BASOPHILS # BLD AUTO: 0 K/UL (ref 0–0.2)
BASOPHILS # BLD AUTO: 0.01 K/UL (ref 0–0.2)
BASOPHILS NFR BLD: 0 % (ref 0–1.9)
BASOPHILS NFR BLD: 0.2 % (ref 0–1.9)
BASOPHILS NFR BLD: 0.2 % (ref 0–1.9)
BASOPHILS NFR BLD: 0.3 % (ref 0–1.9)
BILIRUB SERPL-MCNC: 1.9 MG/DL (ref 0.1–1)
BILIRUB SERPL-MCNC: 2.3 MG/DL (ref 0.1–1)
BLD GP AB SCN CELLS X3 SERPL QL: NORMAL
BLD PROD TYP BPU: NORMAL
BLOOD UNIT EXPIRATION DATE: NORMAL
BLOOD UNIT TYPE CODE: 6200
BLOOD UNIT TYPE: NORMAL
BNP SERPL-MCNC: 273 PG/ML (ref 0–99)
BSA FOR ECHO PROCEDURE: 1.68 M2
BUN SERPL-MCNC: 24 MG/DL (ref 8–23)
BUN SERPL-MCNC: 27 MG/DL (ref 6–30)
BUN SERPL-MCNC: 27 MG/DL (ref 8–23)
BUN SERPL-MCNC: 27 MG/DL (ref 8–23)
BUN SERPL-MCNC: 32 MG/DL (ref 8–23)
BUN SERPL-MCNC: 43 MG/DL (ref 6–30)
BURR CELLS BLD QL SMEAR: ABNORMAL
CALCIUM SERPL-MCNC: 6.4 MG/DL (ref 8.7–10.5)
CALCIUM SERPL-MCNC: 6.4 MG/DL (ref 8.7–10.5)
CALCIUM SERPL-MCNC: 6.6 MG/DL (ref 8.7–10.5)
CALCIUM SERPL-MCNC: 7.8 MG/DL (ref 8.7–10.5)
CHLORIDE SERPL-SCNC: 103 MMOL/L (ref 95–110)
CHLORIDE SERPL-SCNC: 106 MMOL/L (ref 95–110)
CHLORIDE SERPL-SCNC: 108 MMOL/L (ref 95–110)
CHLORIDE SERPL-SCNC: 112 MMOL/L (ref 95–110)
CHLORIDE SERPL-SCNC: 112 MMOL/L (ref 95–110)
CHLORIDE SERPL-SCNC: 116 MMOL/L (ref 95–110)
CO2 SERPL-SCNC: 15 MMOL/L (ref 23–29)
CO2 SERPL-SCNC: 17 MMOL/L (ref 23–29)
CO2 SERPL-SCNC: 18 MMOL/L (ref 23–29)
CO2 SERPL-SCNC: 18 MMOL/L (ref 23–29)
CODING SYSTEM: NORMAL
CREAT SERPL-MCNC: 1 MG/DL (ref 0.5–1.4)
CREAT SERPL-MCNC: 1.1 MG/DL (ref 0.5–1.4)
CREAT SERPL-MCNC: 1.2 MG/DL (ref 0.5–1.4)
CREAT SERPL-MCNC: 1.3 MG/DL (ref 0.5–1.4)
CV ECHO LV RWT: 0.37 CM
DACRYOCYTES BLD QL SMEAR: ABNORMAL
DIFFERENTIAL METHOD: ABNORMAL
DISPENSE STATUS: NORMAL
DOP CALC AO PEAK VEL: 1.27 M/S
DOP CALC AO VTI: 24.62 CM
DOP CALC LVOT AREA: 3.05 CM2
DOP CALC LVOT DIAMETER: 1.97 CM
DOP CALC LVOT PEAK VEL: 1.03 M/S
DOP CALC LVOT STROKE VOLUME: 69.25 CM3
DOP CALCLVOT PEAK VEL VTI: 22.73 CM
E WAVE DECELERATION TIME: 240.94 MSEC
E/A RATIO: 1.07
E/E' RATIO: 9.75
ECHO LV POSTERIOR WALL: 0.76 CM (ref 0.6–1.1)
EOSINOPHIL # BLD AUTO: 0 K/UL (ref 0–0.5)
EOSINOPHIL NFR BLD: 0 % (ref 0–8)
EOSINOPHIL NFR BLD: 0 % (ref 0–8)
EOSINOPHIL NFR BLD: 0.3 % (ref 0–8)
EOSINOPHIL NFR BLD: 0.7 % (ref 0–8)
ERYTHROCYTE [DISTWIDTH] IN BLOOD BY AUTOMATED COUNT: 20.7 % (ref 11.5–14.5)
ERYTHROCYTE [DISTWIDTH] IN BLOOD BY AUTOMATED COUNT: 20.7 % (ref 11.5–14.5)
ERYTHROCYTE [DISTWIDTH] IN BLOOD BY AUTOMATED COUNT: 20.8 % (ref 11.5–14.5)
ERYTHROCYTE [DISTWIDTH] IN BLOOD BY AUTOMATED COUNT: 21.7 % (ref 11.5–14.5)
EST. GFR  (AFRICAN AMERICAN): 54.4 ML/MIN/1.73 M^2
EST. GFR  (AFRICAN AMERICAN): >60 ML/MIN/1.73 M^2
EST. GFR  (NON AFRICAN AMERICAN): 47.2 ML/MIN/1.73 M^2
EST. GFR  (NON AFRICAN AMERICAN): 58.8 ML/MIN/1.73 M^2
FIBRINOGEN PPP-MCNC: 358 MG/DL (ref 182–366)
FRACTIONAL SHORTENING: 28 % (ref 28–44)
GIANT PLATELETS BLD QL SMEAR: PRESENT
GLUCOSE SERPL-MCNC: 104 MG/DL (ref 70–110)
GLUCOSE SERPL-MCNC: 105 MG/DL (ref 70–110)
GLUCOSE SERPL-MCNC: 114 MG/DL (ref 70–110)
GLUCOSE SERPL-MCNC: 127 MG/DL (ref 70–110)
GLUCOSE SERPL-MCNC: 132 MG/DL (ref 70–110)
GLUCOSE SERPL-MCNC: 157 MG/DL (ref 70–110)
HAPTOGLOB SERPL-MCNC: 30 MG/DL (ref 30–250)
HCO3 UR-SCNC: 15.4 MMOL/L (ref 24–28)
HCT VFR BLD AUTO: 16.4 % (ref 37–48.5)
HCT VFR BLD AUTO: 19.3 % (ref 37–48.5)
HCT VFR BLD AUTO: 19.5 % (ref 37–48.5)
HCT VFR BLD AUTO: 25.7 % (ref 37–48.5)
HCT VFR BLD CALC: 17 %PCV (ref 36–54)
HCT VFR BLD CALC: 19 %PCV (ref 36–54)
HGB BLD-MCNC: 5.4 G/DL (ref 12–16)
HGB BLD-MCNC: 6.2 G/DL (ref 12–16)
HGB BLD-MCNC: 6.4 G/DL (ref 12–16)
HGB BLD-MCNC: 8.3 G/DL (ref 12–16)
HYPOCHROMIA BLD QL SMEAR: ABNORMAL
IMM GRANULOCYTES # BLD AUTO: 0.04 K/UL (ref 0–0.04)
IMM GRANULOCYTES # BLD AUTO: 0.05 K/UL (ref 0–0.04)
IMM GRANULOCYTES # BLD AUTO: 0.05 K/UL (ref 0–0.04)
IMM GRANULOCYTES # BLD AUTO: 0.06 K/UL (ref 0–0.04)
IMM GRANULOCYTES NFR BLD AUTO: 0.9 % (ref 0–0.5)
IMM GRANULOCYTES NFR BLD AUTO: 1.1 % (ref 0–0.5)
IMM GRANULOCYTES NFR BLD AUTO: 1.1 % (ref 0–0.5)
IMM GRANULOCYTES NFR BLD AUTO: 1.3 % (ref 0–0.5)
INR PPP: 2.2 (ref 0.8–1.2)
INTERVENTRICULAR SEPTUM: 0.53 CM (ref 0.6–1.1)
IVRT: 0.13 MSEC
LA MAJOR: 5.31 CM
LA MINOR: 5.42 CM
LA WIDTH: 3.96 CM
LACTATE SERPL-SCNC: 2 MMOL/L (ref 0.5–2.2)
LACTATE SERPL-SCNC: 7.2 MMOL/L (ref 0.5–2.2)
LDH SERPL L TO P-CCNC: 454 U/L (ref 110–260)
LDH SERPL L TO P-CCNC: 6.53 MMOL/L (ref 0.5–2.2)
LEFT ATRIUM SIZE: 4 CM
LEFT ATRIUM VOLUME INDEX: 43.5 ML/M2
LEFT ATRIUM VOLUME: 72.23 CM3
LEFT INTERNAL DIMENSION IN SYSTOLE: 2.96 CM (ref 2.1–4)
LEFT VENTRICLE DIASTOLIC VOLUME INDEX: 44.44 ML/M2
LEFT VENTRICLE DIASTOLIC VOLUME: 73.85 ML
LEFT VENTRICLE MASS INDEX: 44.1 G/M2
LEFT VENTRICLE SYSTOLIC VOLUME INDEX: 20.4 ML/M2
LEFT VENTRICLE SYSTOLIC VOLUME: 33.96 ML
LEFT VENTRICULAR INTERNAL DIMENSION IN DIASTOLE: 4.09 CM (ref 3.5–6)
LEFT VENTRICULAR MASS: 73.24 G
LIPASE SERPL-CCNC: 11 U/L (ref 4–60)
LV LATERAL E/E' RATIO: 8.67
LV SEPTAL E/E' RATIO: 11.14
LYMPHOCYTES # BLD AUTO: 0.3 K/UL (ref 1–4.8)
LYMPHOCYTES # BLD AUTO: 0.4 K/UL (ref 1–4.8)
LYMPHOCYTES NFR BLD: 13.6 % (ref 18–48)
LYMPHOCYTES NFR BLD: 6.1 % (ref 18–48)
LYMPHOCYTES NFR BLD: 6.1 % (ref 18–48)
LYMPHOCYTES NFR BLD: 6.6 % (ref 18–48)
MAGNESIUM SERPL-MCNC: 0.9 MG/DL (ref 1.6–2.6)
MAGNESIUM SERPL-MCNC: 1 MG/DL (ref 1.6–2.6)
MAGNESIUM SERPL-MCNC: 1.1 MG/DL (ref 1.6–2.6)
MCH RBC QN AUTO: 30.9 PG (ref 27–31)
MCH RBC QN AUTO: 30.9 PG (ref 27–31)
MCH RBC QN AUTO: 31.2 PG (ref 27–31)
MCH RBC QN AUTO: 31.5 PG (ref 27–31)
MCHC RBC AUTO-ENTMCNC: 31.8 G/DL (ref 32–36)
MCHC RBC AUTO-ENTMCNC: 32.3 G/DL (ref 32–36)
MCHC RBC AUTO-ENTMCNC: 32.9 G/DL (ref 32–36)
MCHC RBC AUTO-ENTMCNC: 33.2 G/DL (ref 32–36)
MCV RBC AUTO: 94 FL (ref 82–98)
MCV RBC AUTO: 94 FL (ref 82–98)
MCV RBC AUTO: 96 FL (ref 82–98)
MCV RBC AUTO: 99 FL (ref 82–98)
MONOCYTES # BLD AUTO: 0.3 K/UL (ref 0.3–1)
MONOCYTES # BLD AUTO: 0.4 K/UL (ref 0.3–1)
MONOCYTES # BLD AUTO: 0.5 K/UL (ref 0.3–1)
MONOCYTES # BLD AUTO: 0.5 K/UL (ref 0.3–1)
MONOCYTES NFR BLD: 10.8 % (ref 4–15)
MONOCYTES NFR BLD: 7.1 % (ref 4–15)
MONOCYTES NFR BLD: 8 % (ref 4–15)
MONOCYTES NFR BLD: 9.8 % (ref 4–15)
MV PEAK A VEL: 0.73 M/S
MV PEAK E VEL: 0.78 M/S
NEUTROPHILS # BLD AUTO: 2.4 K/UL (ref 1.8–7.7)
NEUTROPHILS # BLD AUTO: 3.8 K/UL (ref 1.8–7.7)
NEUTROPHILS # BLD AUTO: 4.7 K/UL (ref 1.8–7.7)
NEUTROPHILS # BLD AUTO: 4.9 K/UL (ref 1.8–7.7)
NEUTROPHILS NFR BLD: 74.7 % (ref 38–73)
NEUTROPHILS NFR BLD: 82 % (ref 38–73)
NEUTROPHILS NFR BLD: 84.1 % (ref 38–73)
NEUTROPHILS NFR BLD: 85 % (ref 38–73)
NRBC BLD-RTO: 1 /100 WBC
NRBC BLD-RTO: 12 /100 WBC
NUM UNITS TRANS PACKED RBC: NORMAL
OVALOCYTES BLD QL SMEAR: ABNORMAL
PCO2 BLDA: 25.4 MMHG (ref 35–45)
PH SMN: 7.39 [PH] (ref 7.35–7.45)
PHOSPHATE SERPL-MCNC: 3.7 MG/DL (ref 2.7–4.5)
PISA TR MAX VEL: 2.78 M/S
PLATELET # BLD AUTO: 101 K/UL (ref 150–350)
PLATELET # BLD AUTO: 103 K/UL (ref 150–350)
PLATELET # BLD AUTO: 105 K/UL (ref 150–350)
PLATELET # BLD AUTO: 75 K/UL (ref 150–350)
PLATELET BLD QL SMEAR: ABNORMAL
PMV BLD AUTO: 12.1 FL (ref 9.2–12.9)
PMV BLD AUTO: 12.5 FL (ref 9.2–12.9)
PMV BLD AUTO: 12.7 FL (ref 9.2–12.9)
PMV BLD AUTO: 12.9 FL (ref 9.2–12.9)
PO2 BLDA: 33 MMHG (ref 40–60)
POC BE: -10 MMOL/L
POC IONIZED CALCIUM: 0.92 MMOL/L (ref 1.06–1.42)
POC IONIZED CALCIUM: 1.03 MMOL/L (ref 1.06–1.42)
POC SATURATED O2: 65 % (ref 95–100)
POC TCO2 (MEASURED): 16 MMOL/L (ref 23–29)
POC TCO2 (MEASURED): 21 MMOL/L (ref 23–29)
POC TCO2: 16 MMOL/L (ref 24–29)
POIKILOCYTOSIS BLD QL SMEAR: SLIGHT
POIKILOCYTOSIS BLD QL SMEAR: SLIGHT
POLYCHROMASIA BLD QL SMEAR: ABNORMAL
POLYCHROMASIA BLD QL SMEAR: ABNORMAL
POTASSIUM BLD-SCNC: 2.8 MMOL/L (ref 3.5–5.1)
POTASSIUM BLD-SCNC: 3.1 MMOL/L (ref 3.5–5.1)
POTASSIUM SERPL-SCNC: 2.9 MMOL/L (ref 3.5–5.1)
POTASSIUM SERPL-SCNC: 3 MMOL/L (ref 3.5–5.1)
POTASSIUM SERPL-SCNC: 3.2 MMOL/L (ref 3.5–5.1)
POTASSIUM SERPL-SCNC: 3.2 MMOL/L (ref 3.5–5.1)
PROCALCITONIN SERPL IA-MCNC: 0.79 NG/ML
PROT SERPL-MCNC: 4.1 G/DL (ref 6–8.4)
PROT SERPL-MCNC: 5.1 G/DL (ref 6–8.4)
PROTHROMBIN TIME: 20.9 SEC (ref 9–12.5)
PULM VEIN S/D RATIO: 1.43
PV PEAK D VEL: 0.44 M/S
PV PEAK S VEL: 0.63 M/S
PV PEAK VELOCITY: 0.77 CM/S
RA MAJOR: 4.51 CM
RA PRESSURE: 8 MMHG
RA WIDTH: 3.2 CM
RBC # BLD AUTO: 1.75 M/UL (ref 4–5.4)
RBC # BLD AUTO: 1.97 M/UL (ref 4–5.4)
RBC # BLD AUTO: 2.05 M/UL (ref 4–5.4)
RBC # BLD AUTO: 2.69 M/UL (ref 4–5.4)
RIGHT VENTRICULAR END-DIASTOLIC DIMENSION: 3.08 CM
RV TISSUE DOPPLER FREE WALL SYSTOLIC VELOCITY 1 (APICAL 4 CHAMBER VIEW): 14.62 M/S
SAMPLE: ABNORMAL
SCHISTOCYTES BLD QL SMEAR: ABNORMAL
SCHISTOCYTES BLD QL SMEAR: ABNORMAL
SCHISTOCYTES BLD QL SMEAR: PRESENT
SCHISTOCYTES BLD QL SMEAR: PRESENT
SINUS: 3.02 CM
SITE: ABNORMAL
SODIUM BLD-SCNC: 139 MMOL/L (ref 136–145)
SODIUM BLD-SCNC: 140 MMOL/L (ref 136–145)
SODIUM SERPL-SCNC: 137 MMOL/L (ref 136–145)
SODIUM SERPL-SCNC: 138 MMOL/L (ref 136–145)
SODIUM SERPL-SCNC: 139 MMOL/L (ref 136–145)
SODIUM SERPL-SCNC: 139 MMOL/L (ref 136–145)
SPHEROCYTES BLD QL SMEAR: ABNORMAL
STJ: 2.32 CM
TDI LATERAL: 0.09
TDI SEPTAL: 0.07
TDI: 0.08
TR MAX PG: 30.91 MMHG
TRICUSPID ANNULAR PLANE SYSTOLIC EXCURSION: 2.31 CM
TROPONIN I SERPL DL<=0.01 NG/ML-MCNC: 0.03 NG/ML (ref 0–0.03)
TV REST PULMONARY ARTERY PRESSURE: 39 MMHG
WBC # BLD AUTO: 3.16 K/UL (ref 3.9–12.7)
WBC # BLD AUTO: 4.62 K/UL (ref 3.9–12.7)
WBC # BLD AUTO: 5.62 K/UL (ref 3.9–12.7)
WBC # BLD AUTO: 5.76 K/UL (ref 3.9–12.7)

## 2019-05-20 PROCEDURE — 99291 CRITICAL CARE FIRST HOUR: CPT | Mod: ,,, | Performed by: EMERGENCY MEDICINE

## 2019-05-20 PROCEDURE — 99291 PR CRITICAL CARE, E/M 30-74 MINUTES: ICD-10-PCS | Mod: ,,, | Performed by: INTERNAL MEDICINE

## 2019-05-20 PROCEDURE — 83605 ASSAY OF LACTIC ACID: CPT

## 2019-05-20 PROCEDURE — 93005 ELECTROCARDIOGRAM TRACING: CPT

## 2019-05-20 PROCEDURE — 85060 PATHOLOGIST REVIEW: ICD-10-PCS | Mod: ,,, | Performed by: PATHOLOGY

## 2019-05-20 PROCEDURE — 82803 BLOOD GASES ANY COMBINATION: CPT

## 2019-05-20 PROCEDURE — 25000003 PHARM REV CODE 250: Performed by: STUDENT IN AN ORGANIZED HEALTH CARE EDUCATION/TRAINING PROGRAM

## 2019-05-20 PROCEDURE — 96367 TX/PROPH/DG ADDL SEQ IV INF: CPT

## 2019-05-20 PROCEDURE — 96361 HYDRATE IV INFUSION ADD-ON: CPT

## 2019-05-20 PROCEDURE — 85384 FIBRINOGEN ACTIVITY: CPT

## 2019-05-20 PROCEDURE — 96375 TX/PRO/DX INJ NEW DRUG ADDON: CPT

## 2019-05-20 PROCEDURE — 85025 COMPLETE CBC W/AUTO DIFF WBC: CPT | Mod: 91

## 2019-05-20 PROCEDURE — 86920 COMPATIBILITY TEST SPIN: CPT

## 2019-05-20 PROCEDURE — 96365 THER/PROPH/DIAG IV INF INIT: CPT

## 2019-05-20 PROCEDURE — 25500020 PHARM REV CODE 255: Performed by: EMERGENCY MEDICINE

## 2019-05-20 PROCEDURE — 85060 BLOOD SMEAR INTERPRETATION: CPT | Mod: ,,, | Performed by: PATHOLOGY

## 2019-05-20 PROCEDURE — 25000003 PHARM REV CODE 250: Performed by: EMERGENCY MEDICINE

## 2019-05-20 PROCEDURE — 84145 PROCALCITONIN (PCT): CPT

## 2019-05-20 PROCEDURE — 93010 ELECTROCARDIOGRAM REPORT: CPT | Mod: 76,,, | Performed by: INTERNAL MEDICINE

## 2019-05-20 PROCEDURE — 83690 ASSAY OF LIPASE: CPT

## 2019-05-20 PROCEDURE — 83010 ASSAY OF HAPTOGLOBIN QUANT: CPT

## 2019-05-20 PROCEDURE — 80048 BASIC METABOLIC PNL TOTAL CA: CPT | Mod: 91

## 2019-05-20 PROCEDURE — 99291 CRITICAL CARE FIRST HOUR: CPT | Mod: ,,, | Performed by: INTERNAL MEDICINE

## 2019-05-20 PROCEDURE — 36430 TRANSFUSION BLD/BLD COMPNT: CPT

## 2019-05-20 PROCEDURE — 80053 COMPREHEN METABOLIC PANEL: CPT | Mod: 91

## 2019-05-20 PROCEDURE — 83880 ASSAY OF NATRIURETIC PEPTIDE: CPT

## 2019-05-20 PROCEDURE — 63600175 PHARM REV CODE 636 W HCPCS: Performed by: EMERGENCY MEDICINE

## 2019-05-20 PROCEDURE — 87040 BLOOD CULTURE FOR BACTERIA: CPT

## 2019-05-20 PROCEDURE — 83615 LACTATE (LD) (LDH) ENZYME: CPT

## 2019-05-20 PROCEDURE — 83605 ASSAY OF LACTIC ACID: CPT | Mod: 91

## 2019-05-20 PROCEDURE — 83735 ASSAY OF MAGNESIUM: CPT | Mod: 91

## 2019-05-20 PROCEDURE — 63600175 PHARM REV CODE 636 W HCPCS: Performed by: STUDENT IN AN ORGANIZED HEALTH CARE EDUCATION/TRAINING PROGRAM

## 2019-05-20 PROCEDURE — 86850 RBC ANTIBODY SCREEN: CPT

## 2019-05-20 PROCEDURE — 99291 PR CRITICAL CARE, E/M 30-74 MINUTES: ICD-10-PCS | Mod: ,,, | Performed by: EMERGENCY MEDICINE

## 2019-05-20 PROCEDURE — 84484 ASSAY OF TROPONIN QUANT: CPT

## 2019-05-20 PROCEDURE — 80048 BASIC METABOLIC PNL TOTAL CA: CPT

## 2019-05-20 PROCEDURE — P9038 RBC IRRADIATED: HCPCS

## 2019-05-20 PROCEDURE — 80053 COMPREHEN METABOLIC PANEL: CPT

## 2019-05-20 PROCEDURE — 93010 ELECTROCARDIOGRAM REPORT: CPT | Mod: ,,, | Performed by: INTERNAL MEDICINE

## 2019-05-20 PROCEDURE — 96366 THER/PROPH/DIAG IV INF ADDON: CPT

## 2019-05-20 PROCEDURE — 85730 THROMBOPLASTIN TIME PARTIAL: CPT

## 2019-05-20 PROCEDURE — 99900035 HC TECH TIME PER 15 MIN (STAT)

## 2019-05-20 PROCEDURE — 85610 PROTHROMBIN TIME: CPT

## 2019-05-20 PROCEDURE — 83735 ASSAY OF MAGNESIUM: CPT

## 2019-05-20 PROCEDURE — 27201040 HC RC 50 FILTER

## 2019-05-20 PROCEDURE — 93010 EKG 12-LEAD: ICD-10-PCS | Mod: 76,,, | Performed by: INTERNAL MEDICINE

## 2019-05-20 PROCEDURE — 25000003 PHARM REV CODE 250: Performed by: NURSE PRACTITIONER

## 2019-05-20 PROCEDURE — 99291 CRITICAL CARE FIRST HOUR: CPT | Mod: 25

## 2019-05-20 PROCEDURE — 84100 ASSAY OF PHOSPHORUS: CPT

## 2019-05-20 PROCEDURE — 96372 THER/PROPH/DIAG INJ SC/IM: CPT | Mod: 59

## 2019-05-20 PROCEDURE — 20000000 HC ICU ROOM

## 2019-05-20 PROCEDURE — 96374 THER/PROPH/DIAG INJ IV PUSH: CPT | Mod: 59

## 2019-05-20 RX ORDER — VANCOMYCIN 2 GRAM/500 ML IN 0.9 % SODIUM CHLORIDE INTRAVENOUS
2000
Status: COMPLETED | OUTPATIENT
Start: 2019-05-20 | End: 2019-05-20

## 2019-05-20 RX ORDER — SODIUM CHLORIDE 0.9 % (FLUSH) 0.9 %
10 SYRINGE (ML) INJECTION
Status: DISCONTINUED | OUTPATIENT
Start: 2019-05-20 | End: 2019-05-23 | Stop reason: HOSPADM

## 2019-05-20 RX ORDER — VANCOMYCIN HCL IN 5 % DEXTROSE 1G/250ML
1000 PLASTIC BAG, INJECTION (ML) INTRAVENOUS
Status: DISCONTINUED | OUTPATIENT
Start: 2019-05-21 | End: 2019-05-22

## 2019-05-20 RX ORDER — DEXAMETHASONE 0.5 MG/1
2 TABLET ORAL 2 TIMES DAILY WITH MEALS
Status: DISCONTINUED | OUTPATIENT
Start: 2019-05-20 | End: 2019-05-23 | Stop reason: HOSPADM

## 2019-05-20 RX ORDER — ONDANSETRON 8 MG/1
8 TABLET, ORALLY DISINTEGRATING ORAL
Status: COMPLETED | OUTPATIENT
Start: 2019-05-20 | End: 2019-05-20

## 2019-05-20 RX ORDER — POTASSIUM CHLORIDE 20 MEQ/1
40 TABLET, EXTENDED RELEASE ORAL
Status: DISCONTINUED | OUTPATIENT
Start: 2019-05-20 | End: 2019-05-20

## 2019-05-20 RX ORDER — LOPERAMIDE HYDROCHLORIDE 2 MG/1
2 CAPSULE ORAL 4 TIMES DAILY PRN
Status: DISCONTINUED | OUTPATIENT
Start: 2019-05-20 | End: 2019-05-23

## 2019-05-20 RX ORDER — HYDROCODONE BITARTRATE AND ACETAMINOPHEN 500; 5 MG/1; MG/1
TABLET ORAL
Status: DISCONTINUED | OUTPATIENT
Start: 2019-05-20 | End: 2019-05-23 | Stop reason: HOSPADM

## 2019-05-20 RX ORDER — POTASSIUM CHLORIDE 29.8 MG/ML
40 INJECTION INTRAVENOUS ONCE
Status: COMPLETED | OUTPATIENT
Start: 2019-05-20 | End: 2019-05-21

## 2019-05-20 RX ORDER — CALCIUM CARBONATE 200(500)MG
1000 TABLET,CHEWABLE ORAL ONCE
Status: DISCONTINUED | OUTPATIENT
Start: 2019-05-20 | End: 2019-05-21

## 2019-05-20 RX ORDER — POTASSIUM CHLORIDE 20 MEQ/15ML
40 SOLUTION ORAL
Status: DISPENSED | OUTPATIENT
Start: 2019-05-20 | End: 2019-05-20

## 2019-05-20 RX ORDER — LIDOCAINE HYDROCHLORIDE 10 MG/ML
5 INJECTION, SOLUTION EPIDURAL; INFILTRATION; INTRACAUDAL; PERINEURAL ONCE
Status: DISCONTINUED | OUTPATIENT
Start: 2019-05-20 | End: 2019-05-20

## 2019-05-20 RX ORDER — LANOLIN ALCOHOL/MO/W.PET/CERES
800 CREAM (GRAM) TOPICAL
Status: DISCONTINUED | OUTPATIENT
Start: 2019-05-20 | End: 2019-05-22

## 2019-05-20 RX ORDER — CALCIUM CARBONATE 200(500)MG
1000 TABLET,CHEWABLE ORAL ONCE
Status: COMPLETED | OUTPATIENT
Start: 2019-05-20 | End: 2019-05-20

## 2019-05-20 RX ORDER — POTASSIUM CHLORIDE 20 MEQ/15ML
40 SOLUTION ORAL
Status: DISCONTINUED | OUTPATIENT
Start: 2019-05-20 | End: 2019-05-20

## 2019-05-20 RX ORDER — POTASSIUM CHLORIDE 20 MEQ/15ML
60 SOLUTION ORAL
Status: DISCONTINUED | OUTPATIENT
Start: 2019-05-20 | End: 2019-05-22

## 2019-05-20 RX ORDER — SODIUM,POTASSIUM PHOSPHATES 280-250MG
2 POWDER IN PACKET (EA) ORAL
Status: DISCONTINUED | OUTPATIENT
Start: 2019-05-20 | End: 2019-05-22

## 2019-05-20 RX ORDER — PANTOPRAZOLE SODIUM 40 MG/1
40 TABLET, DELAYED RELEASE ORAL DAILY
Status: DISCONTINUED | OUTPATIENT
Start: 2019-05-20 | End: 2019-05-23 | Stop reason: HOSPADM

## 2019-05-20 RX ORDER — SODIUM CHLORIDE 9 MG/ML
1000 INJECTION, SOLUTION INTRAVENOUS
Status: COMPLETED | OUTPATIENT
Start: 2019-05-20 | End: 2019-05-20

## 2019-05-20 RX ORDER — POTASSIUM CHLORIDE 20 MEQ/15ML
40 SOLUTION ORAL
Status: COMPLETED | OUTPATIENT
Start: 2019-05-20 | End: 2019-05-20

## 2019-05-20 RX ORDER — HYDRALAZINE HYDROCHLORIDE 25 MG/1
25 TABLET, FILM COATED ORAL EVERY 8 HOURS PRN
Status: DISCONTINUED | OUTPATIENT
Start: 2019-05-20 | End: 2019-05-21

## 2019-05-20 RX ORDER — OXYCODONE AND ACETAMINOPHEN 5; 325 MG/1; MG/1
1 TABLET ORAL EVERY 4 HOURS PRN
Status: DISCONTINUED | OUTPATIENT
Start: 2019-05-20 | End: 2019-05-23 | Stop reason: HOSPADM

## 2019-05-20 RX ORDER — POTASSIUM CHLORIDE 7.45 MG/ML
10 INJECTION INTRAVENOUS
Status: COMPLETED | OUTPATIENT
Start: 2019-05-20 | End: 2019-05-20

## 2019-05-20 RX ORDER — POTASSIUM CHLORIDE 20 MEQ/15ML
40 SOLUTION ORAL
Status: DISCONTINUED | OUTPATIENT
Start: 2019-05-20 | End: 2019-05-22

## 2019-05-20 RX ORDER — ONDANSETRON HYDROCHLORIDE 8 MG/1
TABLET, FILM COATED ORAL
Qty: 30 TABLET | Refills: 0 | Status: ON HOLD | OUTPATIENT
Start: 2019-05-20 | End: 2019-05-23 | Stop reason: HOSPADM

## 2019-05-20 RX ORDER — MAGNESIUM SULFATE HEPTAHYDRATE 40 MG/ML
2 INJECTION, SOLUTION INTRAVENOUS
Status: COMPLETED | OUTPATIENT
Start: 2019-05-20 | End: 2019-05-20

## 2019-05-20 RX ORDER — HEPARIN SODIUM,PORCINE/D5W 25000/250
18 INTRAVENOUS SOLUTION INTRAVENOUS CONTINUOUS
Status: DISCONTINUED | OUTPATIENT
Start: 2019-05-20 | End: 2019-05-20

## 2019-05-20 RX ORDER — OXYCODONE AND ACETAMINOPHEN 10; 325 MG/1; MG/1
1 TABLET ORAL EVERY 4 HOURS PRN
Status: DISCONTINUED | OUTPATIENT
Start: 2019-05-20 | End: 2019-05-23 | Stop reason: HOSPADM

## 2019-05-20 RX ORDER — NOREPINEPHRINE BITARTRATE/D5W 4MG/250ML
PLASTIC BAG, INJECTION (ML) INTRAVENOUS
Status: DISPENSED
Start: 2019-05-20 | End: 2019-05-21

## 2019-05-20 RX ORDER — ONDANSETRON 4 MG/1
4 TABLET, FILM COATED ORAL EVERY 6 HOURS PRN
Status: DISCONTINUED | OUTPATIENT
Start: 2019-05-20 | End: 2019-05-23

## 2019-05-20 RX ORDER — ACETAMINOPHEN 500 MG
1000 TABLET ORAL
Status: COMPLETED | OUTPATIENT
Start: 2019-05-20 | End: 2019-05-20

## 2019-05-20 RX ORDER — ENOXAPARIN SODIUM 100 MG/ML
1 INJECTION SUBCUTANEOUS
Status: DISCONTINUED | OUTPATIENT
Start: 2019-05-20 | End: 2019-05-23 | Stop reason: HOSPADM

## 2019-05-20 RX ORDER — NOREPINEPHRINE BITARTRATE/D5W 4MG/250ML
0.05 PLASTIC BAG, INJECTION (ML) INTRAVENOUS CONTINUOUS
Status: DISCONTINUED | OUTPATIENT
Start: 2019-05-20 | End: 2019-05-21

## 2019-05-20 RX ADMIN — SODIUM CHLORIDE 1905 ML: 0.9 INJECTION, SOLUTION INTRAVENOUS at 10:05

## 2019-05-20 RX ADMIN — Medication 0.05 MCG/KG/MIN: at 03:05

## 2019-05-20 RX ADMIN — ACETAMINOPHEN 1000 MG: 500 TABLET ORAL at 10:05

## 2019-05-20 RX ADMIN — ONDANSETRON 8 MG: 8 TABLET, ORALLY DISINTEGRATING ORAL at 10:05

## 2019-05-20 RX ADMIN — VANCOMYCIN HYDROCHLORIDE 2000 MG: 100 INJECTION, POWDER, LYOPHILIZED, FOR SOLUTION INTRAVENOUS at 11:05

## 2019-05-20 RX ADMIN — MAGNESIUM SULFATE IN WATER 2 G: 40 INJECTION, SOLUTION INTRAVENOUS at 06:05

## 2019-05-20 RX ADMIN — CALCIUM CARBONATE (ANTACID) CHEW TAB 500 MG 1000 MG: 500 CHEW TAB at 06:05

## 2019-05-20 RX ADMIN — POTASSIUM CHLORIDE 40 MEQ: 20 SOLUTION ORAL at 06:05

## 2019-05-20 RX ADMIN — SODIUM CHLORIDE 1000 ML: 0.9 INJECTION, SOLUTION INTRAVENOUS at 12:05

## 2019-05-20 RX ADMIN — SODIUM CHLORIDE 1000 ML: 0.9 INJECTION, SOLUTION INTRAVENOUS at 03:05

## 2019-05-20 RX ADMIN — POTASSIUM CHLORIDE 10 MEQ: 10 INJECTION, SOLUTION INTRAVENOUS at 02:05

## 2019-05-20 RX ADMIN — PANTOPRAZOLE SODIUM 40 MG: 40 TABLET, DELAYED RELEASE ORAL at 02:05

## 2019-05-20 RX ADMIN — DEXAMETHASONE 2 MG: 0.5 TABLET ORAL at 06:05

## 2019-05-20 RX ADMIN — PIPERACILLIN AND TAZOBACTAM 4.5 G: 4; .5 INJECTION, POWDER, LYOPHILIZED, FOR SOLUTION INTRAVENOUS; PARENTERAL at 10:05

## 2019-05-20 RX ADMIN — POTASSIUM CHLORIDE 40 MEQ: 20 SOLUTION ORAL at 02:05

## 2019-05-20 RX ADMIN — MAGNESIUM SULFATE IN WATER 2 G: 40 INJECTION, SOLUTION INTRAVENOUS at 12:05

## 2019-05-20 RX ADMIN — ENOXAPARIN SODIUM 60 MG: 100 INJECTION SUBCUTANEOUS at 02:05

## 2019-05-20 RX ADMIN — IOHEXOL 100 ML: 350 INJECTION, SOLUTION INTRAVENOUS at 12:05

## 2019-05-20 NOTE — H&P
[facility]  Critical Care Medicine  History & Physical    Patient Name: Roopa Hanley  MRN: [id number]  Admission Date: 5/20/2019  Hospital Length of Stay: 0 days  Code Status: Full Code  Attending Physician: Chapincito Abad DO   Primary Care Provider: Maikel Cesar MD   Principal Problem: Acute pulmonary embolism    Subjective:     HPI:  68F with pancreatic cancer, on active chemo, and DVT on Eliquis complains of SOB, fever, and chills developing this morning on the way to a chemo session. She has had baseline nausea and weakness secondary to chemotherapy. She states she was having increasing shortness of breath starting this morning, then episodes of nausea and nonbloody, nonbilious emesis when getting into the car. Daughter notes chills and lethargy during the trip. She complains of nonproductive cough. Patient endorses diarrhea and abdominal pain unchanged from previous weeks. Chemotherapy last week had been deferred due to low WBC count. She denies chest pain, headache, LOC, blood in stool.    Previous history significant for pancreatic cancer diagnosed in 1/2019, currently on active chemo, with duodenal stenosis and biliary obstruction. She has a DVT in the LLE diagnosed in 4/2019 and has been on Eliquis. She also has hypertension.    In the ED, patient had a fever to 102 and was hypoxic requiring NC. Tachycardic to 120, RR 22, BP wnl. Lactic acid was 6.5 on ISTAT and 7.2 from the lab. Troponin wnl, . She was given vancomycin and zosyn as well as 1L NS and zofran. At time of my interview, patient's symptoms have significantly improved. MICU was consulted for sepsis and lactic acidosis.    Patient then went for CTA and was found to have extensive pulmonary embolisms as well as GGO in the left upper lobe.    Hospital/ICU Course:  No notes on file     Past Medical History:   Diagnosis Date    Biliary obstruction     Duodenal stenosis     Hypertension     Jaundice        Past  Surgical History:   Procedure Laterality Date    EGD (ESOPHAGOGASTRODUODENOSCOPY) N/A 2/1/2019    Performed by Celestino Christianson MD at [facility] ENDO (2ND FLR)    EGD (ESOPHAGOGASTRODUODENOSCOPY) N/A 1/25/2019    Performed by Rashawn Martinez MD at [facility] ENDO (2ND FLR)    ERCP (ENDOSCOPIC RETROGRADE CHOLANGIOPANCREATOGRAPHY) N/A 1/25/2019    Performed by Rashawn Martinez MD at [facility] ENDO (2ND FLR)    FOOT SURGERY Right 2012    HYSTERECTOMY      partial    [id number]-R-CATH N/A 2/22/2019    Performed by [facility] Provider at [facility] OR 2ND FLR    ULTRASOUND, UPPER GI TRACT, ENDOSCOPIC N/A 2/1/2019    Performed by Celestino Christianson MD at [facility] ENDO (2ND FLR)    ULTRASOUND, UPPER GI TRACT, ENDOSCOPIC N/A 1/25/2019    Performed by Rashawn Martinez MD at [facility] ENDO (2ND FLR)       Review of patient's allergies indicates:  No Known Allergies    Family History     Problem Relation (Age of Onset)    Cancer Paternal Aunt, Paternal Uncle, Cousin    Heart disease Mother    Stroke Father        Tobacco Use    Smoking status: Never Smoker    Smokeless tobacco: Never Used   Substance and Sexual Activity    Alcohol use: No     Frequency: Never    Drug use: No    Sexual activity: Not on file      Review of Systems   Constitutional: Positive for chills, diaphoresis, fatigue and fever.   HENT: Negative for congestion and rhinorrhea.    Eyes: Negative for pain and visual disturbance.   Respiratory: Positive for cough and shortness of breath. Negative for apnea and choking.    Cardiovascular: Negative for chest pain, palpitations and leg swelling.   Gastrointestinal: Positive for diarrhea, nausea and vomiting. Negative for abdominal pain and blood in stool.   Genitourinary: Negative for dysuria, flank pain and hematuria.   Musculoskeletal: Negative for arthralgias and gait problem.   Skin: Negative for color change, pallor and rash.   Neurological: Negative for dizziness, syncope, light-headedness and headaches.    Psychiatric/Behavioral: Negative for agitation, behavioral problems and confusion.     Objective:     Vital Signs (Most Recent):  Temp: 98.9 °F (37.2 °C) (05/20/19 1240)  Pulse: 84 (05/20/19 1248)  Resp: (!) 8 (05/20/19 1015)  BP: (!) 92/52 (05/20/19 1248)  SpO2: 100 % (05/20/19 1248) Vital Signs (24h Range):  Temp:  [98.9 °F (37.2 °C)-102 °F (38.9 °C)] 98.9 °F (37.2 °C)  Pulse:  [] 84  Resp:  [8-22] 8  SpO2:  [81 %-100 %] 100 %  BP: ()/(52-65) 92/52   Weight: 63.5 kg (140 lb)  Body mass index is 24.8 kg/m².      Intake/Output Summary (Last 24 hours) at 5/20/2019 1305  Last data filed at 5/20/2019 1251  Gross per 24 hour   Intake 2005 ml   Output --   Net 2005 ml       Physical Exam   Constitutional: She is oriented to person, place, and time. She appears well-developed and well-nourished. No distress.   HENT:   Head: Normocephalic and atraumatic.   Right Ear: External ear normal.   Left Ear: External ear normal.   Eyes: Pupils are equal, round, and reactive to light. Conjunctivae and EOM are normal.   Neck: Normal range of motion.   Cardiovascular: Normal rate and regular rhythm.   No murmur heard.  Pulmonary/Chest: Effort normal and breath sounds normal.   Abdominal: Soft. She exhibits no distension. There is no tenderness.   Musculoskeletal: She exhibits edema.   Neurological: She is alert and oriented to person, place, and time.   Skin: Skin is warm and dry. Capillary refill takes less than 2 seconds. She is not diaphoretic. No erythema.   Psychiatric: She has a normal mood and affect. Her behavior is normal.       Vents:     Lines/Drains/Airways     Central Venous Catheter Line                 Port A Cath Single Lumen 02/22/19 1519 right internal jugular 86 days          Peripheral Intravenous Line                 Peripheral IV - Single Lumen 05/20/19 0952 22 G Right Hand less than 1 day              Significant Labs:    CBC/Anemia Profile:  Recent Labs   Lab 05/20/19  1011 05/20/19  1011   WBC  3.16*  --    HGB 8.3*  --    HCT 25.7* 19*   *  --    MCV 96  --    RDW 20.8*  --         Chemistries:  Recent Labs   Lab 05/20/19  1011      K 2.9*      CO2 17*   BUN 32*   CREATININE 1.2   CALCIUM 7.8*   ALBUMIN 2.1*   PROT 5.1*   BILITOT 2.3*   ALKPHOS 182*   ALT 56*   AST 77*   MG 0.9*   PHOS 3.7       Lactic Acid:   Recent Labs   Lab 05/20/19  1011   LACTATE 7.2*       Significant Imaging: I have reviewed all pertinent imaging results/findings within the past 24 hours.    Assessment/Plan:     Cardiac/Vascular  Essential hypertension  Will hold home hypertensives due to new PE  PRN hydralazine    Renal/  Hypomagnesemia  Replete PRN    Hypokalemia  Replete PRN    ID  Sepsis  Patient with known cancer and PE with DVT presents with sepsis, focal ground glass opacities on chest CT.  - Will treat empirically with vanc and zosyn    Hematology  * Acute pulmonary embolism  Patient with pancreatic cancer and known LLE DVT, on Eliquis, presents with multiple acute bilateral PE. Presented with fever, tachycardia, tachypnea, lethargy. Patient is hemodynamically stable with new oxygen requirements. No evidence of RH strain on bedside echo. Troponin wnl,  on presentation with lactic acid 7.2.    - Lovenox 1 mg/kg Q12h  - TTE ordered  - LE US to eval interval change in DVT  - Telemetry and continuous pulse ox  - Continue O2    Oncology  Anemia in neoplastic disease  Hemoglobin stable. Will monitor.    Pancreatic adenocarcinoma  Diagnosed 1/2019. On active chemo.    GI  Diarrhea  Chronic diarrhea. Will hold home lomotil and immodium    Biliary obstruction  Secondary to pancreatic mass. T bili 2.3 on presentation. No acute intervention. Will monitor.        Critical Care Daily Checklist:    A: Awake: RASS Goal/Actual Goal:    Actual:     B: Spontaneous Breathing Trial Performed?     C: SAT & SBT Coordinated?  N/a                      D: Delirium: CAM-ICU     E: Early Mobility Performed? Yes   F:  Feeding Goal:    Status:     Current Diet Order   Procedures    Diet Adult Regular (IDDSI Level 7)      AS: Analgesia/Sedation N/a   T: Thromboembolic Prophylaxis Therapeutic lovenox for active PE   H: HOB > 300 Yes   U: Stress Ulcer Prophylaxis (if needed) PPI   G: Glucose Control N/a   B: Bowel Function     I: Indwelling Catheter (Lines & Bass) Necessity Yes   D: De-escalation of Antimicrobials/Pharmacotherapies No    Plan for the day/ETD Begin AC, monitor    Code Status:  Family/Goals of Care: Full Code         Critical secondary to Patient has a condition that poses threat to life and bodily function: Pulmonary Embolism     Critical care was time spent personally by me on the following activities: development of treatment plan with patient or surrogate and bedside caregivers, discussions with consultants, evaluation of patient's response to treatment, examination of patient, ordering and performing treatments and interventions, ordering and review of laboratory studies, ordering and review of radiographic studies, pulse oximetry, re-evaluation of patient's condition. This critical care time did not overlap with that of any other provider or involve time for any procedures.     Rudy Lemon MD  Critical Care Medicine  [facility]

## 2019-05-20 NOTE — ASSESSMENT & PLAN NOTE
Patient with known cancer and PE with DVT presents with sepsis, focal ground glass opacities on chest CT.  - Will treat empirically with vanc and zosyn

## 2019-05-20 NOTE — HPI
Ms. Hanley is a 68 year old female with a past medical history of HTN, DVT d/x 1.5 months ago on Eliquis and pancreatic cancer (s/p 2 cycles of FOLFIRINOX and now switched to gem-nab paclitaxel) who was sent from the infusion center due to SOB. She stated that she has had long standing ALTMAN for the past month or so that has been stable. She stated that it was a pretty sudden worsening of her exertional capacity. She does get lightheaded when she stands too quickly and occasionally has headaches. She admits to nausea, vomiting and diarrhea with an inability to keep much down at all. Cardiology was consulted for evaluation of CDT.

## 2019-05-20 NOTE — CONSULTS
Consult received. See full H&P.     Behram Khan, MD  Internal Medicine, PGY-2  #[phone]  M: [phone]

## 2019-05-20 NOTE — SUBJECTIVE & OBJECTIVE
Past Medical History:   Diagnosis Date    Biliary obstruction     Duodenal stenosis     Hypertension     Jaundice        Past Surgical History:   Procedure Laterality Date    EGD (ESOPHAGOGASTRODUODENOSCOPY) N/A 2/1/2019    Performed by Celestino Christianson MD at [facility] ENDO (2ND FLR)    EGD (ESOPHAGOGASTRODUODENOSCOPY) N/A 1/25/2019    Performed by Rashawn Martinez MD at [facility] ENDO (2ND FLR)    ERCP (ENDOSCOPIC RETROGRADE CHOLANGIOPANCREATOGRAPHY) N/A 1/25/2019    Performed by [facility] at [facility] (2ND FLR)    FOOT SURGERY Right 2012    HYSTERECTOMY      partial    [id number]-K-CATH N/A 2/22/2019    Performed by [facility] Diagnostic Provider at [facility] OR 2ND FLR    ULTRASOUND, UPPER GI TRACT, ENDOSCOPIC N/A 2/1/2019    Performed by Celestino Christianson MD at [facility] ENDO (2ND FLR)    ULTRASOUND, UPPER GI TRACT, ENDOSCOPIC N/A 1/25/2019    Performed by Rashawn Martinez MD at [facility] (2ND FLR)       Review of patient's allergies indicates:  No Known Allergies    Current Facility-Administered Medications on File Prior to Encounter   Medication    potassium chloride CR tablet 40 mEq     Current Outpatient Medications on File Prior to Encounter   Medication Sig    apixaban (ELIQUIS) 5 mg (74 tabs) DsPk For the first 7 days take two 5 mg tablets twice daily.  After 7 days take one 5 mg tablet twice daily.    bisoprolol-hydrochlorothiazide (ZIAC) 10-6.25 mg per tablet Take 1 tablet by mouth 2 (two) times daily.    dexamethasone (DECADRON) 2 MG tablet Take 1 tablet (2 mg total) by mouth 2 (two) times daily with meals.    diphenoxylate-atropine 2.5-0.025 mg (LOMOTIL) 2.5-0.025 mg per tablet Take 1 tablet by mouth 4 (four) times daily as needed for Diarrhea.    dronabinol (MARINOL) 5 MG capsule Take 1 capsule (5 mg total) by mouth 2 (two) times daily before meals.    hydrALAZINE (APRESOLINE) 50 MG tablet Take 75 mg by mouth 3 (three) times daily.    lipase-protease-amylase 24,000-76,000-120,000 units (CREON)  24,000-76,000 -120,000 unit capsule Take 1 capsule by mouth 3 (three) times daily with meals.    loperamide (IMODIUM) 2 mg capsule Take 2 mg by mouth 4 (four) times daily as needed for Diarrhea.    LORazepam (ATIVAN) 1 MG tablet Take 1 mg by mouth every 8 (eight) hours as needed for Anxiety.    morphine (MS CONTIN) 15 MG 12 hr tablet Take 1 tablet (15 mg total) by mouth 2 (two) times daily.    morphine (MSIR) 15 MG tablet Take 2 tablets (30 mg total) by mouth every 4 (four) hours as needed for Pain.    ondansetron (ZOFRAN) 8 MG tablet TAKE 1 TABLET(8 MG) BY MOUTH EVERY 8 HOURS AS NEEDED FOR NAUSEA    pantoprazole (PROTONIX) 40 MG tablet Take 1 tablet (40 mg total) by mouth once daily.    potassium chloride SA (K-DUR,KLOR-CON) 20 MEQ tablet Take 2 tablets (40 mEq total) by mouth 2 (two) times daily.    promethazine (PHENERGAN) 25 MG tablet TAKE 1 TABLET(25 MG) BY MOUTH EVERY 6 HOURS AS NEEDED FOR NAUSEA     Family History     Problem Relation (Age of Onset)    Cancer Paternal Aunt, Paternal Uncle, Cousin    Heart disease Mother    Stroke Father        Tobacco Use    Smoking status: Never Smoker    Smokeless tobacco: Never Used   Substance and Sexual Activity    Alcohol use: No     Frequency: Never    Drug use: No    Sexual activity: Not on file     Review of Systems   Constitution: Positive for malaise/fatigue. Negative for chills and fever.   HENT: Negative.    Cardiovascular: Positive for dyspnea on exertion. Negative for irregular heartbeat, leg swelling, orthopnea, palpitations and paroxysmal nocturnal dyspnea.   Respiratory: Negative for shortness of breath and wheezing.    Skin: Negative for color change and rash.   Musculoskeletal: Negative for arthritis, back pain and myalgias.   Gastrointestinal: Positive for diarrhea, nausea and vomiting. Negative for abdominal pain and constipation.   Neurological: Positive for light-headedness. Negative for dizziness, numbness and paresthesias.    Psychiatric/Behavioral: Negative.      Objective:     Vital Signs (Most Recent):  Temp: 98.9 °F (37.2 °C) (05/20/19 1240)  Pulse: 84 (05/20/19 1248)  Resp: (!) 8 (05/20/19 1015)  BP: (!) 92/52 (05/20/19 1248)  SpO2: 100 % (05/20/19 1248) Vital Signs (24h Range):  Temp:  [98.9 °F (37.2 °C)-102 °F (38.9 °C)] 98.9 °F (37.2 °C)  Pulse:  [] 84  Resp:  [8-22] 8  SpO2:  [81 %-100 %] 100 %  BP: ()/(52-65) 92/52     Weight: 63.5 kg (140 lb)  Body mass index is 24.8 kg/m².    SpO2: 100 %         Intake/Output Summary (Last 24 hours) at 5/20/2019 1338  Last data filed at 5/20/2019 1251  Gross per 24 hour   Intake 2005 ml   Output --   Net 2005 ml       Lines/Drains/Airways     Central Venous Catheter Line                 Port A Cath Single Lumen 02/22/19 1519 right internal jugular 86 days          Peripheral Intravenous Line                 Peripheral IV - Single Lumen 05/20/19 0952 22 G Right Hand less than 1 day                Physical Exam   Constitutional: Vital signs are normal. She appears well-developed and well-nourished. She is cooperative.  Non-toxic appearance. No distress.   HENT:   Head: Normocephalic and atraumatic.   Neck: No hepatojugular reflux and no JVD present. Carotid bruit is not present.   Cardiovascular: Normal rate, regular rhythm, S1 normal, S2 normal and normal heart sounds. Exam reveals no gallop.   No murmur heard.  Pulses:       Dorsalis pedis pulses are 2+ on the right side, and 2+ on the left side.        Posterior tibial pulses are 2+ on the right side, and 2+ on the left side.   2+ lower extremity edema in the left leg, trace in the right.   Pulmonary/Chest: Effort normal and breath sounds normal. No respiratory distress. She has no decreased breath sounds. She has no wheezes. She has no rhonchi. She has no rales.   Abdominal: Soft. Normal appearance and bowel sounds are normal. She exhibits no distension and no mass. There is no tenderness.   Neurological: She is alert.   Skin:  Skin is warm, dry and intact.       Significant Labs:   CMP   Recent Labs   Lab 05/20/19  1011      K 2.9*      CO2 17*   *   BUN 32*   CREATININE 1.2   CALCIUM 7.8*   PROT 5.1*   ALBUMIN 2.1*   BILITOT 2.3*   ALKPHOS 182*   AST 77*   ALT 56*   ANIONGAP 14   ESTGFRAFRICA 54.4*   EGFRNONAA 47.2*   , CBC   Recent Labs   Lab 05/20/19  1011 05/20/19  1011   WBC 3.16*  --    HGB 8.3*  --    HCT 25.7* 19*   *  --    , INR No results for input(s): INR, PROTIME in the last 48 hours. and Troponin   Recent Labs   Lab 05/20/19  1011   TROPONINI 0.027*       Significant Imaging:    EKG: Sinus tachycardia with flattened T waves inferolaterally.  2D echo pending.

## 2019-05-20 NOTE — ASSESSMENT & PLAN NOTE
Patient with pancreatic cancer and known LLE DVT, on Eliquis, presents with multiple acute bilateral PE. Presented with fever, tachycardia, tachypnea, lethargy. Patient is hemodynamically stable with new oxygen requirements. No evidence of RH strain on bedside echo. Troponin wnl,  on presentation with lactic acid 7.2.    - Lovenox 1 mg/kg Q12h  - TTE ordered  - LE US to eval interval change in DVT  - Telemetry and continuous pulse ox  - Continue O2

## 2019-05-20 NOTE — ASSESSMENT & PLAN NOTE
--submassive PE with hypoxemia  --due to the presence of pancreatic cancer and ongoing chemotherapy with multiple concomittant factors such as possible sepsis, we do not recommend CDT at this time  --would recommend treatment with an approved anticoagulation for cancer patients.  --await 2D echo.

## 2019-05-20 NOTE — HPI
68F with pancreatic cancer, on active chemo, and DVT on Eliquis complains of SOB, fever, and chills developing this morning on the way to a chemo session. She has had baseline nausea and weakness secondary to chemotherapy. She states she was having increasing shortness of breath starting this morning, then episodes of nausea and nonbloody, nonbilious emesis when getting into the car. Daughter notes chills and lethargy during the trip. She complains of nonproductive cough. Patient endorses diarrhea and abdominal pain unchanged from previous weeks. Chemotherapy last week had been deferred due to low WBC count. She denies chest pain, headache, LOC, blood in stool.    Previous history significant for pancreatic cancer diagnosed in 1/2019, currently on active chemo, with duodenal stenosis and biliary obstruction. She has a DVT in the LLE diagnosed in 4/2019 and has been on Eliquis. She also has hypertension.    In the ED, patient had a fever to 102 and was hypoxic requiring NC. Tachycardic to 120, RR 22, BP wnl. Lactic acid was 6.5 on ISTAT and 7.2 from the lab. Troponin wnl, . She was given vancomycin and zosyn as well as 1L NS and zofran. At time of my interview, patient's symptoms have significantly improved. MICU was consulted for sepsis and lactic acidosis.    Patient then went for CTA and was found to have extensive pulmonary embolisms as well as GGO in the left upper lobe.

## 2019-05-20 NOTE — ED NOTES
LOC: The patient is awake, alert and aware of environment with an appropriate affect, the patient is oriented x 3 and speaking appropriately.  APPEARANCE: Patient resting comfortably and in no acute distress, patient is clean and well groomed, patient's clothing is properly fastened.  SKIN: The skin is warm and dry, color consistent with ethnicity, patient has normal skin turgor and moist mucus membranes, skin intact, no breakdown or bruising noted.  MUSCULOSKELETAL: Patient moving all extremities spontaneously, no obvious swelling or deformities noted.  ABDOMEN: Soft and non tender to palpation, no distention noted  NEUROLOGIC:  facial expression is symmetrical, patient moving all extremities spontaneously, normal sensation in all extremities when touched with a finger.  Follows all commands appropriately.    Patient was on the way to hospital for blood work and chemo this am when her daughter picked her up from her home and noticed she was shaking and very weak, patient also began vomiting when she got into the car, cardiac monitoring in progress, oxygen stats are in the high 80s, patient placed on 2L via NC, oxygen levels now 100%, will continue to monitor

## 2019-05-20 NOTE — ED PROVIDER NOTES
Encounter Date: 5/20/2019    SCRIBE #1 NOTE: I, Obedzahira Hayes, am scribing for, and in the presence of,  Dr. Abad. I have scribed the entire note.       History     Chief Complaint   Patient presents with    Fever Post Chemo     Time patient was seen by the provider: 9:55 AM      The patient is a 68 y.o. Female, currently on chemotherapy treatment for pancreatic cancer, with co-morbidities including: HTN, biliary obstruction, and duodenal stenosis, who presents to the ED with a complaint of fever and chills. Patient's relative states that her last chemo treatment was not last week but the week before because her WBCs were too low to participate in chemo last week. She also states she has two blood clots in her leg that are worsening her mobility.  Per daughter, patient began having increased shortness of breath that began approximately 10:00 a.m.. Patient's daughter also notes that as soon as she got into the car to get to the emergency department she began vomiting, nonbilious, nonbloody emesis. Patient states that this morning she was having crampy abdominal pain but denies chest pain. Patient confirms that she has been having an unproductive cough. Patient confirms experiencing diarrhea, vomiting, abdominal pain, chills, fever, and cough. Patient denies any associated blood in urine, blood in the stool, CP, or ear pain. Patient denies any other acute symptoms at this time.         The history is provided by the patient and a relative.     Review of patient's allergies indicates:  No Known Allergies  Past Medical History:   Diagnosis Date    Biliary obstruction     Duodenal stenosis     Hypertension     Jaundice      Past Surgical History:   Procedure Laterality Date    EGD (ESOPHAGOGASTRODUODENOSCOPY) N/A 2/1/2019    Performed by Celestino [facility]irstianson MD at [facility] ENDO (2ND FLR)    EGD (ESOPHAGOGASTRODUODENOSCOPY) N/A 1/25/2019    Performed by Rashawn Martinez MD at [facility] ENDO (2ND FLR)    ERCP (ENDOSCOPIC  RETROGRADE CHOLANGIOPANCREATOGRAPHY) N/A 1/25/2019    Performed by Rashawn Martinez MD at [facility] ENDO (2ND FLR)    FOOT SURGERY Right 2012    HYSTERECTOMY      partial    HEPNCJIIZ-DCSM-Q-CATH N/A 2/22/2019    Performed by [facility] Diagnostic Provider at [facility] OR 2ND FLR    ULTRASOUND, UPPER GI TRACT, ENDOSCOPIC N/A 2/1/2019    Performed by Celestino Christianson MD at [facility] ENDO (2ND FLR)    ULTRASOUND, UPPER GI TRACT, ENDOSCOPIC N/A 1/25/2019    Performed by Rashawn Martinez MD at [facility] ENDO (2ND FLR)     Family History   Problem Relation Age of Onset    Heart disease Mother     Stroke Father     Cancer Paternal Aunt         ovarian    Cancer Paternal Uncle     Cancer Cousin         pancreatic     Social History     Tobacco Use    Smoking status: Never Smoker    Smokeless tobacco: Never Used   Substance Use Topics    Alcohol use: No     Frequency: Never    Drug use: No     Review of Systems   Constitutional: Positive for chills and fever.   HENT: Negative for ear pain and sore throat.    Eyes: Negative for photophobia and pain.   Respiratory: Positive for cough. Negative for shortness of breath.    Cardiovascular: Negative for chest pain.   Gastrointestinal: Positive for abdominal pain, diarrhea and vomiting. Negative for blood in stool and nausea.   Endocrine: Negative for polydipsia and polyphagia.   Genitourinary: Negative for dysuria and hematuria.   Musculoskeletal: Negative for back pain and joint swelling.   Skin: Negative for rash.   Neurological: Negative for speech difficulty and weakness.   Psychiatric/Behavioral: Negative for confusion and hallucinations.   All other systems reviewed and are negative.      Physical Exam     Initial Vitals [05/20/19 0913]   BP Pulse Resp Temp SpO2   124/60 (!) 120 (!) 22 (!) 101.6 °F (38.7 °C) 95 %      MAP       --         Physical Exam  Gen/Constitutional: Interactive.  Febrile with moderate emotional distress.  Head: Normocephalic, Atraumatic  Neck: supple, no masses or  LAD  Eyes: PERRLA, conjunctiva clear  Ears, Nose and Throat: No rhinorrhea or stridor.  Cardiac:  Tachycardic with Reg Rhythm, No murmur  Pulmonary:  Tachypneic, CTA Bilat, no wheezes, rhonchi, rales.  GI: Abdomen soft, non-tender, non-distended; no rebound or guarding  : No CVA tenderness.  Musculoskeletal: Extremities warm, well perfused, no erythema, no edema  Skin:  Small left forearm rash, non blanching, left lower extremity calf tenderness.  Neuro: Alert and Oriented x 3; No focal motor or sensory deficits.    Psych: Normal affect    ED Course   Critical Care  Date/Time: 5/20/2019 10:09 AM  Performed by: Chapincito Abad DO  Authorized by: Chapincito Abad DO   Direct patient critical care time: 15 minutes  Additional history critical care time: 10 minutes  Ordering / reviewing critical care time: 10 minutes  Documentation critical care time: 10 minutes  Consulting other physicians critical care time: 10 minutes  Consult with family critical care time: 5 minutes  Total critical care time (exclusive of procedural time) : 60 minutes  Critical care was necessary to treat or prevent imminent or life-threatening deterioration of the following conditions: sepsis.  Critical care was time spent personally by me on the following activities: blood draw for specimens, development of treatment plan with patient or surrogate, discussions with consultants, evaluation of patient's response to treatment, examination of patient, obtaining history from patient or surrogate, ordering and performing treatments and interventions, ordering and review of laboratory studies, ordering and review of radiographic studies, pulse oximetry, re-evaluation of patient's condition and review of old charts.        Labs Reviewed   CBC W/ AUTO DIFFERENTIAL - Abnormal; Notable for the following components:       Result Value    WBC 3.16 (*)     RBC 2.69 (*)     Hemoglobin 8.3 (*)     Hematocrit 25.7 (*)     RDW 20.8 (*)     Platelets 105 (*)      Immature Granulocytes 1.3 (*)     Lymph # 0.4 (*)     nRBC 12 (*)     Gran% 74.7 (*)     Lymph% 13.6 (*)     All other components within normal limits   COMPREHENSIVE METABOLIC PANEL - Abnormal; Notable for the following components:    Potassium 2.9 (*)     CO2 17 (*)     Glucose 127 (*)     BUN, Bld 32 (*)     Calcium 7.8 (*)     Total Protein 5.1 (*)     Albumin 2.1 (*)     Total Bilirubin 2.3 (*)     Alkaline Phosphatase 182 (*)     AST 77 (*)     ALT 56 (*)     eGFR if  54.4 (*)     eGFR if non  47.2 (*)     All other components within normal limits   LACTIC ACID, PLASMA - Abnormal; Notable for the following components:    Lactate (Lactic Acid) 7.2 (*)     All other components within normal limits   PROCALCITONIN - Abnormal; Notable for the following components:    Procalcitonin 0.79 (*)     All other components within normal limits   TROPONIN I - Abnormal; Notable for the following components:    Troponin I 0.027 (*)     All other components within normal limits   B-TYPE NATRIURETIC PEPTIDE - Abnormal; Notable for the following components:     (*)     All other components within normal limits   MAGNESIUM - Abnormal; Notable for the following components:    Magnesium 0.9 (*)     All other components within normal limits   ISTAT PROCEDURE - Abnormal; Notable for the following components:    POC PCO2 25.4 (*)     POC PO2 33 (*)     POC HCO3 15.4 (*)     POC SATURATED O2 65 (*)     POC Lactate 6.53 (*)     POC TCO2 16 (*)     All other components within normal limits   POCT GLUCOSE - Abnormal; Notable for the following components:    POC Glucose 132 (*)     POC Potassium 2.8 (*)     POC TCO2 (MEASURED) 16 (*)     POC Ionized Calcium 1.03 (*)     POC Hematocrit 19 (*)     All other components within normal limits   CULTURE, BLOOD   CULTURE, BLOOD   PHOSPHORUS   MAGNESIUM   BASIC METABOLIC PANEL   URINALYSIS, REFLEX TO URINE CULTURE   LACTIC ACID, PLASMA   CBC W/ AUTO  DIFFERENTIAL   ISTAT CHEM8     EKG Readings: (Independently Interpreted)   Initial Reading: No STEMI. Previous EKG: Compared with most recent EKG Heart Rate: 100. Ectopy: No Ectopy. Conduction: Normal.   Normal sinus rhythm  T-wave inversion in inferior leads, nonspecific ST and T-wave abnormality, normal intervals, normal axis     ECG Results          EKG 12-lead (In process)  Result time 05/20/19 12:30:56    In process by Interface, Lab In [facility] (05/20/19 12:30:56)                 Narrative:    Test Reason : A41.9,    Vent. Rate : 100 BPM     Atrial Rate : 100 BPM     P-R Int : 136 ms          QRS Dur : 076 ms      QT Int : 314 ms       P-R-T Axes : 060 046 132 degrees     QTc Int : 405 ms      Normal sinus rhythm  Nonspecific ST and T wave abnormality  Abnormal ECG  When compared with ECG of 03-MAY-2019 19:42,  T wave inversion now evident in Inferior leads    Referred By: [email or web address]   SELF           Confirmed By:                             Imaging Results          CT Head Without Contrast (Final result)  Result time 05/20/19 13:47:47    Final result by Mundo Meng MD (05/20/19 13:47:47)                 Impression:      1. No acute intracranial abnormalities noting sequela of chronic microvascular ischemic change and senescent change, mild.  2. No findings to suggest intracranial mass however there is relatively low sensitivity of CT for detection of the same, MRI with contrast as warranted.      Electronically signed by: Mundo Meng MD  Date:    05/20/2019  Time:    13:47             Narrative:    EXAMINATION:  CT HEAD WITHOUT CONTRAST    CLINICAL HISTORY:  r/o mass lesions;    TECHNIQUE:  Low dose axial images were obtained through the head.  Coronal and sagittal reformations were also performed. Contrast was not administered.    COMPARISON:  PET-CT 02/08/2019    FINDINGS:  There is generalized cerebral volume loss.  There is hypoattenuation in a periventricular fashion, likely sequela of chronic  microvascular ischemic change.  There is no evidence of acute major vascular territory infarct, hemorrhage, or mass.  There is no hydrocephalus.  There are no abnormal extra-axial fluid collections.  The paranasal sinuses and mastoid air cells are clear, and there is no evidence of calvarial fracture.  The visualized soft tissues are unremarkable.                                CTA Chest Non-Coronary - PE Study (Final result)  Result time 05/20/19 12:51:25    Final result by Mundo Meng MD (05/20/19 12:51:25)                 Impression:      This report was flagged in Epic as abnormal.    1. Extensive bilateral pulmonary thromboembolism as described above.  2. Patchy ground-glass attenuation with scattered nodular foci primarily involving the left upper lobe, new since the previous examination.  Evolving infectious or inflammatory process is a consideration.  Malignancy is not excluded although felt less likely given short-term development.  Correlation however is advised.  3. Grossly stable abdominal findings of hepatic steatosis, pneumobilia status post duodenal/common duct stent.  Please see PET-CT 02/08/2019.  4. Additional findings above.      Electronically signed by: Mundo Meng MD  Date:    05/20/2019  Time:    12:51             Narrative:    EXAMINATION:  CTA CHEST NON CORONARY    CLINICAL HISTORY:  Dyspnea, cardiac origin suspected;    TECHNIQUE:  Low dose axial images, sagittal and coronal reformations were obtained from the thoracic inlet to the lung bases following the IV administration of 100 mL of Omnipaque 350.  Contrast timing was optimized to evaluate the pulmonary arteries.  MIP images were performed.    COMPARISON:  PET-CT 02/08/2019, CT 01/21/2019    FINDINGS:  The structures at the base of the neck are grossly unremarkable.  No significant mediastinal lymphadenopathy.  The heart is not enlarged.  There is a small pericardial effusion.  The thoracic aorta tapers normally.  The  visualized portions of the kidneys, adrenal glands, spleen and pancreas are unremarkable.  The liver is diffusely hypoattenuating, suggesting steatosis.  There is pneumobilia noting common duct stent.  There is air within the gallbladder.  In comparison to PET-CT 02/08/2019, these findings are grossly stable.  The duodenal stent is partially visualized.    The airways are patent noting mild central peribronchial thickening.  There is bilateral emphysematous change.  There is bilateral basilar dependent atelectasis.  No pneumothorax.  No pleural effusion.  There is ground-glass attenuation within the anterior aspect of the left upper lobe, new since the previous examination.  There are a few regions of nodular opacity in the region and bandlike atelectasis or scarring, largest discrete nodule measures up to 0.5 cm.  There is scattered ground-glass attenuation in the region and associated mild bronchiectasis.  And inferior more nodular focus within the left upper lobe measures 0.4 cm.    Bolus timing is adequate for the evaluation of pulmonary thromboembolism.  There are extensive pulmonary arterial filling defects involving the distal aspects of the right and left pulmonary arteries, extending to lobar, segmental, and subsegmental branches distally.  The majority of the filling defects involve the lower lobes.    Degenerative changes are noted of the spine.  Right chest wall port catheter tip terminates in the region of the right atrium.  No significant axillary lymphadenopathy                               X-Ray Chest AP Portable (Final result)  Result time 05/20/19 10:09:30    Final result by Marco A Chavez MD (05/20/19 10:09:30)                 Impression:      See above      Electronically signed by: Marco A Chavez MD  Date:    05/20/2019  Time:    10:09             Narrative:    EXAMINATION:  XR CHEST AP PORTABLE    CLINICAL HISTORY:  Sepsis;    TECHNIQUE:  Single frontal view of the chest was  "performed.    COMPARISON:  Prior studies dated 3 May 2019 and 13 April 2019    FINDINGS:  Right-sided chemotherapy port and catheter are again demonstrated which, allowing for differences in projection, appear unchanged.  "Elevated" Right hemidiaphragm is again demonstrated.  The hemidiaphragms are sharply outlined and the costophrenic angles are acute with no pleural effusion demonstrated.    There is a small area of added density projected within the right lower lung with the anterior tip of the right 4th rib crosses the posterior aspect of the 6th right rib which may be related to summation shadowing but otherwise suggests the possibly of a small infiltrate within this portion of the lung.  The lungs are otherwise expanded and appear clear.                                 Medical Decision Making:   History:   Old Medical Records: I decided to obtain old medical records.  Initial Assessment:   68-year-old female with a history of pancreatic cancer currently on chemotherapy and a history of hypertension, biliary duct obstruction presenting with concern for sepsis.  Differential Diagnosis:   Differential diagnosis includes but is not limited to:  Sepsis, bacteremia, pulmonary embolism, ACS, pneumonia, biliary cholangitis, intra-abdominal abscess.      Independently Interpreted Test(s):   I have ordered and independently interpreted X-rays - see prior notes.  I have ordered and independently interpreted EKG Reading(s) - see prior notes  Clinical Tests:   Lab Tests: Ordered and Reviewed  Radiological Study: Ordered and Reviewed  Medical Tests: Ordered and Reviewed  Sepsis Perfusion Assessment: I attest, a sepsis perfusion exam was performed within 6 hours of Septic Shock presentation, following fluid resuscitation.  Other:   I have discussed this case with another health care provider.       <> Summary of the Discussion: Critical Care Medicine, Cardiology and Heme - oncology    Emergent evaluation of a patient " presenting with fever and chills. She is a febrile, normotensive , with tachycardia and tachypnea. Physical exam findings remarkable for shivering female who appears acutely ill with tachycardia, tachypnea and fevers. Will obtain code sepsis and sepsis workup.  Patient was resuscitated with IV fluids, which improved her heart rate to 100 or less.  ECG with tachycardia but no signs of ischemia on my read.  Patient placed on pulse oximetry, cardiac and telemetry monitoring.  Sepsis workup with initial pH normal but with lactate of 6.5.  Repeat lactate from direct blood sample is 7.2.  Following code sepsis, treatment was initiated with IV antibiotics to include Zosyn and vancomycin.  Given elevated lactic, discussed case with Critical Care Medicine as well as heme oncology as patient is a chemotherapy patient.  Patient is also tachypneic with hypoxemia with oxygen saturation 80-90 range without oxygen and on room air.  Given concern for hypoxemia, CTA PE study was obtained with bilateral extensive pulmonary emboli.  Notified Critical Care Medicine team, the patient be admitted to ICU level of care.  Lovenox was initiated by the ICU team.  During ICU evaluation, patient became hypotensive, and required Levophed drip and fluid resuscitation to maintain map of greater than 65.  Discussed case with heme oncology team, who will sign off for now and follow up once patient has been evaluated in the ICU, with treatment manage.  Given her extensive pulmonary emboli, discussed case with Interventional Cardiology, who recommended medical management this time with no need for intervention.  CT head was obtained prior to initiation of anticoagulation, with no evidence of intracranial mass or hemorrhage. Patient agreeable to admission plan.  See critical care note for critical care time.    Complexity:  Critical                Scribe Attestation:   Scribe #1: I performed the above scribed service and the documentation accurately  describes the services I performed. I attest to the accuracy of the note.    I, Dr. Chapincito Abad, personally performed the services described in this documentation. All medical record entries made by the scribe were at my direction and in my presence.  I have reviewed the chart and agree that the record reflects my personal performance and is accurate and complete.              Clinical Impression:       ICD-10-CM ICD-9-CM   1. Malignant neoplasm of pancreas, unspecified location of malignancy C25.9 157.9   2. Sepsis A41.9 038.9     995.91   3. Fever and chills R50.9 780.60   4. Hypomagnesemia E83.42 275.2   5. Hypokalemia E87.6 276.8   6. Lactic acidosis E87.2 276.2   7. Bilateral pulmonary embolism I26.99 415.19         Disposition:   Disposition: Admitted  Condition: Serious       Chapincito Abad DO  Dept of Emergency Medicine   [facility]  Spectralink: [id number]                   Chapincito Abad DO  05/20/19 2135

## 2019-05-20 NOTE — PROGRESS NOTES
Pharmacokinetic Initial Assessment: IV Vancomycin    Assessment/Plan:    Initiate intravenous vancomycin with loading dose of 2000 mg once followed by a maintenance dose of vancomycin 1000mg IV every 24 hours  Desired empiric serum trough concentration is 10 to 20 mcg/mL.  Draw vancomycin trough level 30 min prior to fourth dose on 5/23 at approximately 1000  Pharmacy will continue to follow and monitor vancomycin.      Please contact pharmacy at extension [phone] with any questions regarding this assessment.     Thank you for the consult,   Yared Martinez     Patient brief summary:  Roopa Hanley is a 68 y.o. female initiated on antimicrobial therapy with IV Vancomycin for treatment of suspected lower respiratory infection    Drug Allergies:   Review of patient's allergies indicates:  No Known Allergies    Actual Body Weight:   63.5Kg    Renal Function:   Estimated Creatinine Clearance: 49.6 mL/min (based on SCr of 1 mg/dL).,     Dialysis Method (if applicable):      CBC (last 72 hours):  Recent Labs   Lab Result Units 05/20/19  1011 05/20/19  1618 05/20/19  1700   WBC K/uL 3.16* 4.62 5.62   Hemoglobin g/dL 8.3* 5.4* 6.4*   Hematocrit % 25.7* 16.4* 19.3*   Platelets K/uL 105* 75* 103*   Gran% % 74.7* 82.0* 84.1*   Lymph% % 13.6* 6.1* 6.6*   Mono% % 9.8 10.8 8.0   Eosinophil% % 0.3 0.0 0.0   Basophil% % 0.3 0.0 0.2   Differential Method  Automated Automated Automated       Metabolic Panel (last 72 hours):  Recent Labs   Lab Result Units 05/20/19  1011 05/20/19  1534 05/20/19  1618   Sodium mmol/L 139 138 137   Potassium mmol/L 2.9* 3.2* 3.0*   Chloride mmol/L 108 112* 112*   CO2 mmol/L 17* 18* 18*   Glucose mg/dL 127* 105 114*   BUN, Bld mg/dL 32* 27* 27*   Creatinine mg/dL 1.2 1.0 1.0   Albumin g/dL 2.1*  --   --    Total Bilirubin mg/dL 2.3*  --   --    Alkaline Phosphatase U/L 182*  --   --    AST U/L 77*  --   --    ALT U/L 56*  --   --    Magnesium mg/dL 0.9* 1.1* 1.0*   Phosphorus mg/dL 3.7  --   --         Drug levels (last 3 results):  No results for input(s): VANCOMYCINRA, VANCOMYCINPE, VANCOMYCINTR in the last 72 hours.    Microbiologic Results:  Microbiology Results (last 7 days)     Procedure Component Value Units Date/Time    Blood culture x two cultures. Draw prior to antibiotics. [[id number]] Collected:  05/20/19 1011    Order Status:  Completed Specimen:  Blood from Line, Port A Cath Updated:  05/20/19 1715     Blood Culture, Routine No Growth to date    Narrative:       Aerobic and anaerobic    Blood culture x two cultures. Draw prior to antibiotics. [[id number]] Collected:  05/20/19 0951    Order Status:  Completed Specimen:  Blood from Peripheral, Hand, Right Updated:  05/20/19 1715     Blood Culture, Routine No Growth to date    Narrative:       Aerobic and anaerobic    Blood culture [[id number]]     Order Status:  Canceled Specimen:  Blood

## 2019-05-20 NOTE — SUBJECTIVE & OBJECTIVE
Past Medical History:   Diagnosis Date    Biliary obstruction     Duodenal stenosis     Hypertension     Jaundice        Past Surgical History:   Procedure Laterality Date    EGD (ESOPHAGOGASTRODUODENOSCOPY) N/A 2/1/2019    Performed by Celestino Christianson MD at [facility] ENDO (2ND FLR)    EGD (ESOPHAGOGASTRODUODENOSCOPY) N/A 1/25/2019    Performed by Rashawn Martinez MD at [facility] ENDO (2ND FLR)    ERCP (ENDOSCOPIC RETROGRADE CHOLANGIOPANCREATOGRAPHY) N/A 1/25/2019    Performed by Rashawn Martinez MD at [facility] ENDO (2ND FLR)    FOOT SURGERY Right 2012    HYSTERECTOMY      partial    TALGCCIEH-NNDO-O-CATH N/A 2/22/2019    Performed by [facility] Diagnostic Provider at [facility] OR 2ND FLR    ULTRASOUND, UPPER GI TRACT, ENDOSCOPIC N/A 2/1/2019    Performed by Celestino Christianson MD at [facility] ENDO (2ND FLR)    ULTRASOUND, UPPER GI TRACT, ENDOSCOPIC N/A 1/25/2019    Performed by Rashawn Martinez MD at [facility] ENDO (2ND FLR)       Review of patient's allergies indicates:  No Known Allergies    Family History     Problem Relation (Age of Onset)    Cancer Paternal Aunt, Paternal Uncle, Cousin    Heart disease Mother    Stroke Father        Tobacco Use    Smoking status: Never Smoker    Smokeless tobacco: Never Used   Substance and Sexual Activity    Alcohol use: No     Frequency: Never    Drug use: No    Sexual activity: Not on file      Review of Systems   Constitutional: Positive for chills, diaphoresis, fatigue and fever.   HENT: Negative for congestion and rhinorrhea.    Eyes: Negative for pain and visual disturbance.   Respiratory: Positive for cough and shortness of breath. Negative for apnea and choking.    Cardiovascular: Negative for chest pain, palpitations and leg swelling.   Gastrointestinal: Positive for diarrhea, nausea and vomiting. Negative for abdominal pain and blood in stool.   Genitourinary: Negative for dysuria, flank pain and hematuria.   Musculoskeletal: Negative for arthralgias and gait problem.   Skin: Negative for color  change, pallor and rash.   Neurological: Negative for dizziness, syncope, light-headedness and headaches.   Psychiatric/Behavioral: Negative for agitation, behavioral problems and confusion.     Objective:     Vital Signs (Most Recent):  Temp: 98.9 °F (37.2 °C) (05/20/19 1240)  Pulse: 84 (05/20/19 1248)  Resp: (!) 8 (05/20/19 1015)  BP: (!) 92/52 (05/20/19 1248)  SpO2: 100 % (05/20/19 1248) Vital Signs (24h Range):  Temp:  [98.9 °F (37.2 °C)-102 °F (38.9 °C)] 98.9 °F (37.2 °C)  Pulse:  [] 84  Resp:  [8-22] 8  SpO2:  [81 %-100 %] 100 %  BP: ()/(52-65) 92/52   Weight: 63.5 kg (140 lb)  Body mass index is 24.8 kg/m².      Intake/Output Summary (Last 24 hours) at 5/20/2019 1305  Last data filed at 5/20/2019 1251  Gross per 24 hour   Intake 2005 ml   Output --   Net 2005 ml       Physical Exam   Constitutional: She is oriented to person, place, and time. She appears well-developed and well-nourished. No distress.   HENT:   Head: Normocephalic and atraumatic.   Right Ear: External ear normal.   Left Ear: External ear normal.   Eyes: Pupils are equal, round, and reactive to light. Conjunctivae and EOM are normal.   Neck: Normal range of motion.   Cardiovascular: Normal rate and regular rhythm.   No murmur heard.  Pulmonary/Chest: Effort normal and breath sounds normal.   Abdominal: Soft. She exhibits no distension. There is no tenderness.   Musculoskeletal: She exhibits edema.   Neurological: She is alert and oriented to person, place, and time.   Skin: Skin is warm and dry. Capillary refill takes less than 2 seconds. She is not diaphoretic. No erythema.   Psychiatric: She has a normal mood and affect. Her behavior is normal.       Vents:     Lines/Drains/Airways     Central Venous Catheter Line                 Port A Cath Single Lumen 02/22/19 1519 right internal jugular 86 days          Peripheral Intravenous Line                 Peripheral IV - Single Lumen 05/20/19 0952 22 G Right Hand less than 1 day               Significant Labs:    CBC/Anemia Profile:  Recent Labs   Lab 05/20/19  1011 05/20/19  1011   WBC 3.16*  --    HGB 8.3*  --    HCT 25.7* 19*   *  --    MCV 96  --    RDW 20.8*  --         Chemistries:  Recent Labs   Lab 05/20/19  1011      K 2.9*      CO2 17*   BUN 32*   CREATININE 1.2   CALCIUM 7.8*   ALBUMIN 2.1*   PROT 5.1*   BILITOT 2.3*   ALKPHOS 182*   ALT 56*   AST 77*   MG 0.9*   PHOS 3.7       Lactic Acid:   Recent Labs   Lab 05/20/19  1011   LACTATE 7.2*       Significant Imaging: I have reviewed all pertinent imaging results/findings within the past 24 hours.

## 2019-05-20 NOTE — ED NOTES
Patients bp has started to drop, put a call into critical care to come down to assess patient, ed doctor gave verbal order to hang another liter of fluids at this time, will continue to monitor

## 2019-05-20 NOTE — CONSULTS
[facility]  Cardiology  Consult Note    Patient Name: Roopa Hanley  MRN: [id number]  Admission Date: 5/20/2019  Hospital Length of Stay: 0 days  Code Status: Full Code   Attending Provider: Chapincito Abad DO   Consulting Provider: Kristina Flores MD  Primary Care Physician: Maikel Cesar MD  Principal Problem:Acute pulmonary embolism    Patient information was obtained from patient, past medical records and ER records.     Inpatient consult to Cardiology  Consult performed by: Kristina Flores MD  Consult ordered by: Chapincito Abad DO  Reason for consult: Acute PE        Subjective:     Chief Complaint:  SOB     HPI:   Ms. Hanley is a 68 year old female with a past medical history of HTN, DVT d/x 1.5 months ago on Eliquis and pancreatic cancer (s/p 2 cycles of FOLFIRINOX and now switched to gem-nab paclitaxel) who was sent from the infusion center due to SOB. She stated that she has had long standing ALTMAN for the past month or so that has been stable. She stated that it was a pretty sudden worsening of her exertional capacity. She does get lightheaded when she stands too quickly and occasionally has headaches. She admits to nausea, vomiting and diarrhea with an inability to keep much down at all. Cardiology was consulted for evaluation of CDT.    Past Medical History:   Diagnosis Date    Biliary obstruction     Duodenal stenosis     Hypertension     Jaundice        Past Surgical History:   Procedure Laterality Date    EGD (ESOPHAGOGASTRODUODENOSCOPY) N/A 2/1/2019    Performed by Celestino Christianson MD at [facility] ENDO (2ND FLR)    EGD (ESOPHAGOGASTRODUODENOSCOPY) N/A 1/25/2019    Performed by Rashawn Martinez MD at [facility] ENDO (2ND FLR)    ERCP (ENDOSCOPIC RETROGRADE CHOLANGIOPANCREATOGRAPHY) N/A 1/25/2019    Performed by Rashawn Martinez MD at [facility] ENDO (2ND FLR)    FOOT SURGERY Right 2012    HYSTERECTOMY      partial    SQVYDIZBC-NZMK-S-CATH N/A 2/22/2019    Performed by [facility] Diagnostic Provider  at [facility] OR 2ND FLR    ULTRASOUND, UPPER GI TRACT, ENDOSCOPIC N/A 2/1/2019    Performed by Celestino Christianson MD at [facility] ENDO (2ND FLR)    ULTRASOUND, UPPER GI TRACT, ENDOSCOPIC N/A 1/25/2019    Performed by Rashawn Martinez MD at [facility] ENDO (2ND FLR)       Review of patient's allergies indicates:  No Known Allergies    Current Facility-Administered Medications on File Prior to Encounter   Medication    potassium chloride CR tablet 40 mEq     Current Outpatient Medications on File Prior to Encounter   Medication Sig    apixaban (ELIQUIS) 5 mg (74 tabs) DsPk For the first 7 days take two 5 mg tablets twice daily.  After 7 days take one 5 mg tablet twice daily.    bisoprolol-hydrochlorothiazide (ZIAC) 10-6.25 mg per tablet Take 1 tablet by mouth 2 (two) times daily.    dexamethasone (DECADRON) 2 MG tablet Take 1 tablet (2 mg total) by mouth 2 (two) times daily with meals.    diphenoxylate-atropine 2.5-0.025 mg (LOMOTIL) 2.5-0.025 mg per tablet Take 1 tablet by mouth 4 (four) times daily as needed for Diarrhea.    dronabinol (MARINOL) 5 MG capsule Take 1 capsule (5 mg total) by mouth 2 (two) times daily before meals.    hydrALAZINE (APRESOLINE) 50 MG tablet Take 75 mg by mouth 3 (three) times daily.    lipase-protease-amylase 24,000-76,000-120,000 units (CREON) 24,000-76,000 -120,000 unit capsule Take 1 capsule by mouth 3 (three) times daily with meals.    loperamide (IMODIUM) 2 mg capsule Take 2 mg by mouth 4 (four) times daily as needed for Diarrhea.    LORazepam (ATIVAN) 1 MG tablet Take 1 mg by mouth every 8 (eight) hours as needed for Anxiety.    morphine (MS CONTIN) 15 MG 12 hr tablet Take 1 tablet (15 mg total) by mouth 2 (two) times daily.    morphine (MSIR) 15 MG tablet Take 2 tablets (30 mg total) by mouth every 4 (four) hours as needed for Pain.    ondansetron (ZOFRAN) 8 MG tablet TAKE 1 TABLET(8 MG) BY MOUTH EVERY 8 HOURS AS NEEDED FOR NAUSEA    pantoprazole (PROTONIX) 40 MG tablet Take 1 tablet (40  mg total) by mouth once daily.    potassium chloride SA (K-DUR,KLOR-CON) 20 MEQ tablet Take 2 tablets (40 mEq total) by mouth 2 (two) times daily.    promethazine (PHENERGAN) 25 MG tablet TAKE 1 TABLET(25 MG) BY MOUTH EVERY 6 HOURS AS NEEDED FOR NAUSEA     Family History     Problem Relation (Age of Onset)    Cancer Paternal Aunt, Paternal Uncle, Cousin    Heart disease Mother    Stroke Father        Tobacco Use    Smoking status: Never Smoker    Smokeless tobacco: Never Used   Substance and Sexual Activity    Alcohol use: No     Frequency: Never    Drug use: No    Sexual activity: Not on file     Review of Systems   Constitution: Positive for malaise/fatigue. Negative for chills and fever.   HENT: Negative.    Cardiovascular: Positive for dyspnea on exertion. Negative for irregular heartbeat, leg swelling, orthopnea, palpitations and paroxysmal nocturnal dyspnea.   Respiratory: Negative for shortness of breath and wheezing.    Skin: Negative for color change and rash.   Musculoskeletal: Negative for arthritis, back pain and myalgias.   Gastrointestinal: Positive for diarrhea, nausea and vomiting. Negative for abdominal pain and constipation.   Neurological: Positive for light-headedness. Negative for dizziness, numbness and paresthesias.   Psychiatric/Behavioral: Negative.      Objective:     Vital Signs (Most Recent):  Temp: 98.9 °F (37.2 °C) (05/20/19 1240)  Pulse: 84 (05/20/19 1248)  Resp: (!) 8 (05/20/19 1015)  BP: (!) 92/52 (05/20/19 1248)  SpO2: 100 % (05/20/19 1248) Vital Signs (24h Range):  Temp:  [98.9 °F (37.2 °C)-102 °F (38.9 °C)] 98.9 °F (37.2 °C)  Pulse:  [] 84  Resp:  [8-22] 8  SpO2:  [81 %-100 %] 100 %  BP: ()/(52-65) 92/52     Weight: 63.5 kg (140 lb)  Body mass index is 24.8 kg/m².    SpO2: 100 %         Intake/Output Summary (Last 24 hours) at 5/20/2019 1338  Last data filed at 5/20/2019 1251  Gross per 24 hour   Intake 2005 ml   Output --   Net 2005 ml        Lines/Drains/Airways     Central Venous Catheter Line                 Port A Cath Single Lumen 02/22/19 1519 right internal jugular 86 days          Peripheral Intravenous Line                 Peripheral IV - Single Lumen 05/20/19 0952 22 G Right Hand less than 1 day                Physical Exam   Constitutional: Vital signs are normal. She appears well-developed and well-nourished. She is cooperative.  Non-toxic appearance. No distress.   HENT:   Head: Normocephalic and atraumatic.   Neck: No hepatojugular reflux and no JVD present. Carotid bruit is not present.   Cardiovascular: Normal rate, regular rhythm, S1 normal, S2 normal and normal heart sounds. Exam reveals no gallop.   No murmur heard.  Pulses:       Dorsalis pedis pulses are 2+ on the right side, and 2+ on the left side.        Posterior tibial pulses are 2+ on the right side, and 2+ on the left side.   2+ lower extremity edema in the left leg, trace in the right.   Pulmonary/Chest: Effort normal and breath sounds normal. No respiratory distress. She has no decreased breath sounds. She has no wheezes. She has no rhonchi. She has no rales.   Abdominal: Soft. Normal appearance and bowel sounds are normal. She exhibits no distension and no mass. There is no tenderness.   Neurological: She is alert.   Skin: Skin is warm, dry and intact.       Significant Labs:   CMP   Recent Labs   Lab 05/20/19  1011      K 2.9*      CO2 17*   *   BUN 32*   CREATININE 1.2   CALCIUM 7.8*   PROT 5.1*   ALBUMIN 2.1*   BILITOT 2.3*   ALKPHOS 182*   AST 77*   ALT 56*   ANIONGAP 14   ESTGFRAFRICA 54.4*   EGFRNONAA 47.2*   , CBC   Recent Labs   Lab 05/20/19  1011 05/20/19  1011   WBC 3.16*  --    HGB 8.3*  --    HCT 25.7* 19*   *  --    , INR No results for input(s): INR, PROTIME in the last 48 hours. and Troponin   Recent Labs   Lab 05/20/19  1011   TROPONINI 0.027*       Significant Imaging:    EKG: Sinus tachycardia with flattened T waves  inferolaterally.  2D echo pending.    Assessment and Plan:     * Acute pulmonary embolism  --submassive PE with hypoxemia  --due to the presence of pancreatic cancer and ongoing chemotherapy with multiple concomittant factors such as possible sepsis, we do not recommend CDT at this time  --would recommend treatment with an approved anticoagulation for cancer patients.  --await 2D echo.        VTE Risk Mitigation (From admission, onward)        Ordered     enoxaparin injection 60 mg  Every 12 hours (non-standard times)      05/20/19 1329     Reason for no Mechanical VTE Prophylaxis  Once      05/20/19 1325     IP VTE HIGH RISK PATIENT  Once      05/20/19 1325          Thank you for your consult. I will sign off. Please contact us if you have any additional questions.    Kristina Flores MD  Cardiology   [facility]

## 2019-05-21 PROBLEM — N30.80 EMPHYSEMATOUS CYSTITIS: Status: ACTIVE | Noted: 2019-05-21

## 2019-05-21 LAB
ALBUMIN SERPL BCP-MCNC: 1.6 G/DL (ref 3.5–5.2)
ALP SERPL-CCNC: 141 U/L (ref 55–135)
ALT SERPL W/O P-5'-P-CCNC: 46 U/L (ref 10–44)
ANION GAP SERPL CALC-SCNC: 5 MMOL/L (ref 8–16)
ANION GAP SERPL CALC-SCNC: 6 MMOL/L (ref 8–16)
AST SERPL-CCNC: 61 U/L (ref 10–40)
BACTERIA #/AREA URNS AUTO: ABNORMAL /HPF
BASOPHILS # BLD AUTO: 0.01 K/UL (ref 0–0.2)
BASOPHILS # BLD AUTO: 0.02 K/UL (ref 0–0.2)
BASOPHILS NFR BLD: 0.2 % (ref 0–1.9)
BASOPHILS NFR BLD: 0.4 % (ref 0–1.9)
BILIRUB SERPL-MCNC: 2.1 MG/DL (ref 0.1–1)
BILIRUB UR QL STRIP: NEGATIVE
BUN SERPL-MCNC: 24 MG/DL (ref 8–23)
BUN SERPL-MCNC: 24 MG/DL (ref 8–23)
CA-I BLDV-SCNC: 1.08 MMOL/L (ref 1.06–1.42)
CALCIUM SERPL-MCNC: 6.8 MG/DL (ref 8.7–10.5)
CALCIUM SERPL-MCNC: 7.3 MG/DL (ref 8.7–10.5)
CHLORIDE SERPL-SCNC: 113 MMOL/L (ref 95–110)
CHLORIDE SERPL-SCNC: 114 MMOL/L (ref 95–110)
CLARITY UR REFRACT.AUTO: ABNORMAL
CO2 SERPL-SCNC: 17 MMOL/L (ref 23–29)
CO2 SERPL-SCNC: 18 MMOL/L (ref 23–29)
COLOR UR AUTO: ABNORMAL
CREAT SERPL-MCNC: 0.9 MG/DL (ref 0.5–1.4)
CREAT SERPL-MCNC: 0.9 MG/DL (ref 0.5–1.4)
DIFFERENTIAL METHOD: ABNORMAL
DIFFERENTIAL METHOD: ABNORMAL
EOSINOPHIL # BLD AUTO: 0 K/UL (ref 0–0.5)
EOSINOPHIL # BLD AUTO: 0.1 K/UL (ref 0–0.5)
EOSINOPHIL NFR BLD: 0.2 % (ref 0–8)
EOSINOPHIL NFR BLD: 1.8 % (ref 0–8)
ERYTHROCYTE [DISTWIDTH] IN BLOOD BY AUTOMATED COUNT: 21.6 % (ref 11.5–14.5)
ERYTHROCYTE [DISTWIDTH] IN BLOOD BY AUTOMATED COUNT: 23.4 % (ref 11.5–14.5)
EST. GFR  (AFRICAN AMERICAN): >60 ML/MIN/1.73 M^2
EST. GFR  (AFRICAN AMERICAN): >60 ML/MIN/1.73 M^2
EST. GFR  (NON AFRICAN AMERICAN): >60 ML/MIN/1.73 M^2
EST. GFR  (NON AFRICAN AMERICAN): >60 ML/MIN/1.73 M^2
GLUCOSE SERPL-MCNC: 146 MG/DL (ref 70–110)
GLUCOSE SERPL-MCNC: 163 MG/DL (ref 70–110)
GLUCOSE UR QL STRIP: NEGATIVE
HCT VFR BLD AUTO: 24.7 % (ref 37–48.5)
HCT VFR BLD AUTO: 24.8 % (ref 37–48.5)
HGB BLD-MCNC: 7.6 G/DL (ref 12–16)
HGB BLD-MCNC: 8.2 G/DL (ref 12–16)
HGB UR QL STRIP: ABNORMAL
HYALINE CASTS UR QL AUTO: 0 /LPF
IMM GRANULOCYTES # BLD AUTO: 0.04 K/UL (ref 0–0.04)
IMM GRANULOCYTES # BLD AUTO: 0.05 K/UL (ref 0–0.04)
IMM GRANULOCYTES NFR BLD AUTO: 0.8 % (ref 0–0.5)
IMM GRANULOCYTES NFR BLD AUTO: 0.9 % (ref 0–0.5)
INR PPP: 1.6 (ref 0.8–1.2)
KETONES UR QL STRIP: NEGATIVE
LACTATE SERPL-SCNC: 0.6 MMOL/L (ref 0.5–2.2)
LACTATE SERPL-SCNC: 0.7 MMOL/L (ref 0.5–2.2)
LEUKOCYTE ESTERASE UR QL STRIP: ABNORMAL
LYMPHOCYTES # BLD AUTO: 0.4 K/UL (ref 1–4.8)
LYMPHOCYTES # BLD AUTO: 0.4 K/UL (ref 1–4.8)
LYMPHOCYTES NFR BLD: 7.5 % (ref 18–48)
LYMPHOCYTES NFR BLD: 7.9 % (ref 18–48)
MAGNESIUM SERPL-MCNC: 1.6 MG/DL (ref 1.6–2.6)
MAGNESIUM SERPL-MCNC: 2.1 MG/DL (ref 1.6–2.6)
MCH RBC QN AUTO: 29.3 PG (ref 27–31)
MCH RBC QN AUTO: 29.6 PG (ref 27–31)
MCHC RBC AUTO-ENTMCNC: 30.6 G/DL (ref 32–36)
MCHC RBC AUTO-ENTMCNC: 33.2 G/DL (ref 32–36)
MCV RBC AUTO: 89 FL (ref 82–98)
MCV RBC AUTO: 96 FL (ref 82–98)
MICROSCOPIC COMMENT: ABNORMAL
MONOCYTES # BLD AUTO: 0.4 K/UL (ref 0.3–1)
MONOCYTES # BLD AUTO: 0.4 K/UL (ref 0.3–1)
MONOCYTES NFR BLD: 7.7 % (ref 4–15)
MONOCYTES NFR BLD: 7.9 % (ref 4–15)
NEUTROPHILS # BLD AUTO: 4.4 K/UL (ref 1.8–7.7)
NEUTROPHILS # BLD AUTO: 4.5 K/UL (ref 1.8–7.7)
NEUTROPHILS NFR BLD: 81.7 % (ref 38–73)
NEUTROPHILS NFR BLD: 83 % (ref 38–73)
NITRITE UR QL STRIP: NEGATIVE
NRBC BLD-RTO: 0 /100 WBC
NRBC BLD-RTO: 1 /100 WBC
PATH REV BLD -IMP: NORMAL
PATH REV BLD -IMP: NORMAL
PH UR STRIP: 5 [PH] (ref 5–8)
PHOSPHATE SERPL-MCNC: 3.5 MG/DL (ref 2.7–4.5)
PHOSPHATE SERPL-MCNC: 3.7 MG/DL (ref 2.7–4.5)
PLATELET # BLD AUTO: 102 K/UL (ref 150–350)
PLATELET # BLD AUTO: 97 K/UL (ref 150–350)
PMV BLD AUTO: 11.6 FL (ref 9.2–12.9)
PMV BLD AUTO: 11.9 FL (ref 9.2–12.9)
POTASSIUM SERPL-SCNC: 4.1 MMOL/L (ref 3.5–5.1)
POTASSIUM SERPL-SCNC: 4.4 MMOL/L (ref 3.5–5.1)
PROT SERPL-MCNC: 4 G/DL (ref 6–8.4)
PROT UR QL STRIP: ABNORMAL
PROTHROMBIN TIME: 15.9 SEC (ref 9–12.5)
RBC # BLD AUTO: 2.59 M/UL (ref 4–5.4)
RBC # BLD AUTO: 2.77 M/UL (ref 4–5.4)
RBC #/AREA URNS AUTO: 88 /HPF (ref 0–4)
SODIUM SERPL-SCNC: 135 MMOL/L (ref 136–145)
SODIUM SERPL-SCNC: 138 MMOL/L (ref 136–145)
SP GR UR STRIP: 1.02 (ref 1–1.03)
URATE CRY UR QL COMP ASSIST: ABNORMAL
URN SPEC COLLECT METH UR: ABNORMAL
WBC # BLD AUTO: 5.29 K/UL (ref 3.9–12.7)
WBC # BLD AUTO: 5.48 K/UL (ref 3.9–12.7)
WBC #/AREA URNS AUTO: >100 /HPF (ref 0–5)

## 2019-05-21 PROCEDURE — 82330 ASSAY OF CALCIUM: CPT

## 2019-05-21 PROCEDURE — 99233 SBSQ HOSP IP/OBS HIGH 50: CPT | Mod: ,,, | Performed by: INTERNAL MEDICINE

## 2019-05-21 PROCEDURE — 80053 COMPREHEN METABOLIC PANEL: CPT

## 2019-05-21 PROCEDURE — 63600175 PHARM REV CODE 636 W HCPCS: Performed by: STUDENT IN AN ORGANIZED HEALTH CARE EDUCATION/TRAINING PROGRAM

## 2019-05-21 PROCEDURE — 83605 ASSAY OF LACTIC ACID: CPT | Mod: 91

## 2019-05-21 PROCEDURE — 36415 COLL VENOUS BLD VENIPUNCTURE: CPT

## 2019-05-21 PROCEDURE — 27000221 HC OXYGEN, UP TO 24 HOURS

## 2019-05-21 PROCEDURE — 80048 BASIC METABOLIC PNL TOTAL CA: CPT

## 2019-05-21 PROCEDURE — 85610 PROTHROMBIN TIME: CPT

## 2019-05-21 PROCEDURE — 25000003 PHARM REV CODE 250: Performed by: STUDENT IN AN ORGANIZED HEALTH CARE EDUCATION/TRAINING PROGRAM

## 2019-05-21 PROCEDURE — 20600001 HC STEP DOWN PRIVATE ROOM

## 2019-05-21 PROCEDURE — 84100 ASSAY OF PHOSPHORUS: CPT

## 2019-05-21 PROCEDURE — 83735 ASSAY OF MAGNESIUM: CPT | Mod: 91

## 2019-05-21 PROCEDURE — 99233 PR SUBSEQUENT HOSPITAL CARE,LEVL III: ICD-10-PCS | Mod: ,,, | Performed by: INTERNAL MEDICINE

## 2019-05-21 PROCEDURE — 85025 COMPLETE CBC W/AUTO DIFF WBC: CPT

## 2019-05-21 PROCEDURE — 84100 ASSAY OF PHOSPHORUS: CPT | Mod: 91

## 2019-05-21 PROCEDURE — 83605 ASSAY OF LACTIC ACID: CPT

## 2019-05-21 PROCEDURE — 83735 ASSAY OF MAGNESIUM: CPT

## 2019-05-21 PROCEDURE — 94761 N-INVAS EAR/PLS OXIMETRY MLT: CPT

## 2019-05-21 PROCEDURE — 81001 URINALYSIS AUTO W/SCOPE: CPT

## 2019-05-21 PROCEDURE — 87086 URINE CULTURE/COLONY COUNT: CPT

## 2019-05-21 RX ORDER — LORAZEPAM 1 MG/1
1 TABLET ORAL EVERY 8 HOURS PRN
Status: DISCONTINUED | OUTPATIENT
Start: 2019-05-21 | End: 2019-05-23

## 2019-05-21 RX ORDER — DIPHENOXYLATE HYDROCHLORIDE AND ATROPINE SULFATE 2.5; .025 MG/1; MG/1
1 TABLET ORAL 4 TIMES DAILY PRN
Status: DISCONTINUED | OUTPATIENT
Start: 2019-05-21 | End: 2019-05-23 | Stop reason: HOSPADM

## 2019-05-21 RX ORDER — MAGNESIUM SULFATE HEPTAHYDRATE 40 MG/ML
2 INJECTION, SOLUTION INTRAVENOUS
Status: COMPLETED | OUTPATIENT
Start: 2019-05-21 | End: 2019-05-21

## 2019-05-21 RX ORDER — PROMETHAZINE HYDROCHLORIDE 12.5 MG/1
12.5 TABLET ORAL EVERY 6 HOURS PRN
Status: DISCONTINUED | OUTPATIENT
Start: 2019-05-21 | End: 2019-05-23

## 2019-05-21 RX ADMIN — MAGNESIUM SULFATE IN WATER 2 G: 40 INJECTION, SOLUTION INTRAVENOUS at 01:05

## 2019-05-21 RX ADMIN — PIPERACILLIN AND TAZOBACTAM 4.5 G: 4; .5 INJECTION, POWDER, LYOPHILIZED, FOR SOLUTION INTRAVENOUS; PARENTERAL at 11:05

## 2019-05-21 RX ADMIN — PANTOPRAZOLE SODIUM 40 MG: 40 TABLET, DELAYED RELEASE ORAL at 08:05

## 2019-05-21 RX ADMIN — PANCRELIPASE 1 CAPSULE: 24000; 76000; 120000 CAPSULE, DELAYED RELEASE PELLETS ORAL at 10:05

## 2019-05-21 RX ADMIN — VANCOMYCIN HYDROCHLORIDE 1000 MG: 1 INJECTION, POWDER, LYOPHILIZED, FOR SOLUTION INTRAVENOUS at 11:05

## 2019-05-21 RX ADMIN — PANCRELIPASE 1 CAPSULE: 24000; 76000; 120000 CAPSULE, DELAYED RELEASE PELLETS ORAL at 05:05

## 2019-05-21 RX ADMIN — PIPERACILLIN AND TAZOBACTAM 4.5 G: 4; .5 INJECTION, POWDER, LYOPHILIZED, FOR SOLUTION INTRAVENOUS; PARENTERAL at 01:05

## 2019-05-21 RX ADMIN — DEXAMETHASONE 2 MG: 0.5 TABLET ORAL at 08:05

## 2019-05-21 RX ADMIN — CALCIUM GLUCONATE 1000 MG: 98 INJECTION, SOLUTION INTRAVENOUS at 03:05

## 2019-05-21 RX ADMIN — ENOXAPARIN SODIUM 60 MG: 100 INJECTION SUBCUTANEOUS at 12:05

## 2019-05-21 RX ADMIN — PROMETHAZINE HYDROCHLORIDE 12.5 MG: 12.5 TABLET ORAL at 08:05

## 2019-05-21 RX ADMIN — ONDANSETRON 4 MG: 4 TABLET, FILM COATED ORAL at 11:05

## 2019-05-21 RX ADMIN — DEXAMETHASONE 2 MG: 0.5 TABLET ORAL at 05:05

## 2019-05-21 RX ADMIN — LOPERAMIDE HYDROCHLORIDE 2 MG: 2 CAPSULE ORAL at 08:05

## 2019-05-21 RX ADMIN — ENOXAPARIN SODIUM 60 MG: 100 INJECTION SUBCUTANEOUS at 01:05

## 2019-05-21 RX ADMIN — POTASSIUM CHLORIDE 40 MEQ: 400 INJECTION, SOLUTION INTRAVENOUS at 12:05

## 2019-05-21 RX ADMIN — PIPERACILLIN AND TAZOBACTAM 4.5 G: 4; .5 INJECTION, POWDER, LYOPHILIZED, FOR SOLUTION INTRAVENOUS; PARENTERAL at 05:05

## 2019-05-21 NOTE — PLAN OF CARE
Problem: Infection (Sepsis/Septic Shock)  Goal: Absence of Infection Signs/Symptoms    Intervention: Prevent or Manage Infection Progression  Patient slowly weaned off Levophed. Lactic acid trending down.       Comments: NAEON. See flowsheets for vital signs and assessments. See below for updates on progress made.       Neuro: AAO, Afebrile, Pulses palpable. No numbness or tingling.     Cardiovascular:  NS HR 60-80, MAP goal >65 maintained throughout shift.       Pulmonary: Lung sounds clear, 2L NC, Sat >95.    Gastrointestinal: Bowel sounds present, Last BM 05/20, Patient NPO. C/O chronic diarrhea r/t chemo. No BM overnight.     Genitourinary: Bass inserted, CAUTI bundle in place, /shift     Integumentary/other: HAPI prevention bundle in place; No breakdown. Generalized bruising on arms    Infusions: 2xKVO to R Chest Port and R AC 20. R Hand 22 does not draw back and is saline locked.     POC: Step down today.     Patient progressing towards goals as tolerated, plan of care communicated and reviewed with Roopa Hanley. All concerns addressed. Will continue to monitor.

## 2019-05-21 NOTE — ASSESSMENT & PLAN NOTE
Patient with pancreatic cancer and known LLE DVT, on Eliquis, presents with multiple acute bilateral PE. Presented with fever, tachycardia, tachypnea, lethargy. Patient is hemodynamically stable with new oxygen requirements. No evidence of RH strain on bedside echo. Troponin wnl,  on presentation with lactic acid 7.2.    - Lovenox 1 mg/kg Q12h  - No evidence of RV strain on echo, PA pressure 39 mmHg  - Telemetry and continuous pulse ox  - Weaned off O2

## 2019-05-21 NOTE — NURSING TRANSFER
Nursing Transfer Note      5/21/2019     Transfer To: 806 from [id number]    Transfer via wheelchair    Transfer with cardiac monitoring    Transported by RN    Medicines sent: yes    Chart send with patient: Yes    Notified: daughter at bedside    Patient reassessed at: 1600 5/21/19    Upon arrival to floor: patient oriented to room, call bell in reach and bed in lowest position

## 2019-05-21 NOTE — ASSESSMENT & PLAN NOTE
Patient with known cancer and PE with DVT presents with sepsis. Multiple possible sources; CT revealed emphysematous cystitis, bowel wall thickening, and focal ground glass opacities on chest CT. Patient does endorse mild nonproductive cough and chronic diarrhea since starting chemo. Denies urinary complaints.    - Will treat empirically with vanc and zosyn  - UA pending  - Blood cultures NGTD  - Lactic acidosis resolved  - Step down to medical oncology today

## 2019-05-21 NOTE — PROGRESS NOTES
[facility]  Critical Care Medicine  Progress Note    Patient Name: Roopa Hanley  MRN: [id number]  Admission Date: 5/20/2019  Hospital Length of Stay: 1 days  Code Status: Full Code  Attending Provider: Mago Meyer MD  Primary Care Provider: Maikel Cesar MD   Principal Problem: Acute pulmonary embolism    Subjective:     HPI:  68F with pancreatic cancer, on active chemo, and DVT on Eliquis complains of SOB, fever, and chills developing this morning on the way to a chemo session. She has had baseline nausea and weakness secondary to chemotherapy. She states she was having increasing shortness of breath starting this morning, then episodes of nausea and nonbloody, nonbilious emesis when getting into the car. Daughter notes chills and lethargy during the trip. She complains of nonproductive cough. Patient endorses diarrhea and abdominal pain unchanged from previous weeks. Chemotherapy last week had been deferred due to low WBC count. She denies chest pain, headache, LOC, blood in stool.    Previous history significant for pancreatic cancer diagnosed in 1/2019, currently on active chemo, with duodenal stenosis and biliary obstruction. She has a DVT in the LLE diagnosed in 4/2019 and has been on Eliquis. She also has hypertension.    In the ED, patient had a fever to 102 and was hypoxic requiring NC. Tachycardic to 120, RR 22, BP wnl. Lactic acid was 6.5 on ISTAT and 7.2 from the lab. Troponin wnl, . She was given vancomycin and zosyn as well as 1L NS and zofran. At time of my interview, patient's symptoms have significantly improved. MICU was consulted for sepsis and lactic acidosis.    Patient then went for CTA and was found to have extensive pulmonary embolisms as well as GGO in the left upper lobe.    Hospital/ICU Course:  Patient was admitted to ICU service for sepsis with hypotension requiring norepinephrine and lactic acidosis. Lovenox was started at 1 mg/kg Q12h.  Following volume resuscitation, she was found to be severely anemic and received 1U PRBCs. Further workup included CT of the abdomen and pelvis which revealed emphysematous cystitis, new severe hepatic steatosis, and colonic wall thickening. Abdominal ultrasound revealed 1.1cm pancreatic duct. She was unable to urinate and received a Bass which produced 1L of urine with UA pending. She was transitioned off norepinephrine overnight 5/20 with stable blood pressure. Weaned off oxygen by AM of 5/21. Patient continued to no particular complaints into 5/21. She is being treated empirically for cystitis with vancomycin and zosyn and continuing on therapeutic lovenox. Stable for transfer to medical oncology service today.    Interval History/Significant Events: Weaned off levophed last night and oxygen this morning. Bass inserted for retention. UA pending. CT shows emphysematous cystitis, possible colitis, and new severe hepatic steatosis. Received 1U PRBCs overnight with appropriate response.    Review of Systems   Constitutional: Positive for fatigue. Negative for chills, diaphoresis and fever.   HENT: Negative for congestion and rhinorrhea.    Eyes: Negative for pain and visual disturbance.   Respiratory: Positive for cough. Negative for apnea, choking and shortness of breath.    Cardiovascular: Negative for chest pain, palpitations and leg swelling.   Gastrointestinal: Negative for abdominal pain, blood in stool, nausea and vomiting.   Genitourinary: Negative for dysuria, flank pain and hematuria.   Musculoskeletal: Negative for arthralgias and gait problem.   Skin: Negative for color change, pallor and rash.   Neurological: Negative for dizziness, syncope, light-headedness and headaches.   Psychiatric/Behavioral: Negative for agitation, behavioral problems and confusion.     Objective:     Vital Signs (Most Recent):  Temp: 97.7 °F (36.5 °C) (05/21/19 0700)  Pulse: 71 (05/21/19 1000)  Resp: 15 (05/21/19 1000)  BP:  110/60 (05/21/19 1000)  SpO2: 96 % (05/21/19 1000) Vital Signs (24h Range):  Temp:  [97.7 °F (36.5 °C)-98.9 °F (37.2 °C)] 97.7 °F (36.5 °C)  Pulse:  [63-84] 71  Resp:  [11-31] 15  SpO2:  [95 %-100 %] 96 %  BP: ()/(48-84) 110/60   Weight: 63.5 kg (140 lb)  Body mass index is 24.8 kg/m².      Intake/Output Summary (Last 24 hours) at 5/21/2019 1046  Last data filed at 5/21/2019 1000  Gross per 24 hour   Intake 5246.88 ml   Output 1115 ml   Net 4131.88 ml       Physical Exam   Constitutional: She is oriented to person, place, and time. She appears well-developed and well-nourished. No distress.   HENT:   Head: Normocephalic and atraumatic.   Right Ear: External ear normal.   Left Ear: External ear normal.   Eyes: Pupils are equal, round, and reactive to light. Conjunctivae and EOM are normal.   Neck: Normal range of motion.   Cardiovascular: Normal rate and regular rhythm.   No murmur heard.  Pulmonary/Chest: Effort normal and breath sounds normal.   Abdominal: Soft. She exhibits no distension. There is no tenderness.   Musculoskeletal: She exhibits edema.   Neurological: She is alert and oriented to person, place, and time.   Skin: Skin is warm and dry. Capillary refill takes less than 2 seconds. She is not diaphoretic. No erythema.   Psychiatric: She has a normal mood and affect. Her behavior is normal.       Vents:     Lines/Drains/Airways     Central Venous Catheter Line                 Port A Cath Single Lumen 02/22/19 1519 right internal jugular 87 days          Drain                 Urethral Catheter 05/20/19 2200 less than 1 day          Peripheral Intravenous Line                 Peripheral IV - Single Lumen 05/20/19 0952 22 G Right Hand 1 day         Peripheral IV - Single Lumen 05/20/19 1657 20 G Right Antecubital less than 1 day              Significant Labs:    CBC/Anemia Profile:  Recent Labs   Lab 05/20/19  1700 05/20/19  1701 05/20/19  2058 05/21/19  0048   WBC 5.62  --  5.76 5.29   HGB 6.4*  --   6.2* 7.6*   HCT 19.3* 17* 19.5* 24.8*   *  --  101* 97*   MCV 94  --  99* 96   RDW 20.7*  --  21.7* 21.6*        Chemistries:  Recent Labs   Lab 05/20/19  1011  05/20/19  1618 05/20/19  2058 05/21/19  0048 05/21/19  0332      < > 137 139 135* 138   K 2.9*   < > 3.0* 3.2* 4.1 4.4      < > 112* 116* 113* 114*   CO2 17*   < > 18* 15* 17* 18*   BUN 32*   < > 27* 24* 24* 24*   CREATININE 1.2   < > 1.0 1.0 0.9 0.9   CALCIUM 7.8*   < > 6.6* 6.4* 6.8* 7.3*   ALBUMIN 2.1*  --   --  1.7*  --  1.6*   PROT 5.1*  --   --  4.1*  --  4.0*   BILITOT 2.3*  --   --  1.9*  --  2.1*   ALKPHOS 182*  --   --  150*  --  141*   ALT 56*  --   --  50*  --  46*   AST 77*  --   --  74*  --  61*   MG 0.9*   < > 1.0*  --  1.6 2.1   PHOS 3.7  --   --   --  3.7 3.5    < > = values in this interval not displayed.       Coagulation:   Recent Labs   Lab 05/20/19  1700   INR 2.2*   APTT 45.7*       Significant Imaging:  I have reviewed all pertinent imaging results/findings within the past 24 hours.      ABG  Recent Labs   Lab 05/20/19  1011   PH 7.391   PO2 33*   PCO2 25.4*   HCO3 15.4*   BE -10     Assessment/Plan:     Cardiac/Vascular  Essential hypertension  Will hold home hypertensives due to new PE and presser requirements on admission  PRN hydralazine    Renal/  Emphysematous cystitis  See sepsis    Hypomagnesemia  Replete PRN    Hypokalemia  Replete PRN    ID  Sepsis  Patient with known cancer and PE with DVT presents with sepsis. Multiple possible sources; CT revealed emphysematous cystitis, bowel wall thickening, and focal ground glass opacities on chest CT. Patient does endorse mild nonproductive cough and chronic diarrhea since starting chemo. Denies urinary complaints.    - Will treat empirically with vanc and zosyn  - UA pending  - Blood cultures NGTD  - Lactic acidosis resolved  - Step down to medical oncology today    Hematology  * Acute pulmonary embolism  Patient with pancreatic cancer and known LLE DVT, on  Eliquis, presents with multiple acute bilateral PE. Presented with fever, tachycardia, tachypnea, lethargy. Patient is hemodynamically stable with new oxygen requirements. No evidence of RH strain on bedside echo. Troponin wnl,  on presentation with lactic acid 7.2.    - Lovenox 1 mg/kg Q12h  - No evidence of RV strain on echo, PA pressure 39 mmHg  - Telemetry and continuous pulse ox  - Weaned off O2    Oncology  Anemia in neoplastic disease  Received 1U PRBCs due to Hgb <7 on presentation  CBC Q12h    Pancreatic adenocarcinoma  Diagnosed 1/2019. On active chemo.    GI  Diarrhea  Chronic diarrhea. Will hold home lomotil and immodium    Biliary obstruction  Secondary to pancreatic mass. Stent in place. T bili 2.3 on presentation. Pancreatic duct 1.1cm on US. No acute intervention. Will monitor.  - T bili now 2.1       Critical Care Daily Checklist:    A: Awake: RASS Goal/Actual Goal:    Actual: Staley Agitation Sedation Scale (RASS): Alert and calm   B: Spontaneous Breathing Trial Performed?     C: SAT & SBT Coordinated?  N/a                      D: Delirium: CAM-ICU Overall CAM-ICU: Negative   E: Early Mobility Performed? Yes   F: Feeding Goal:    Status:     Current Diet Order   Procedures    Diet Adult Regular (IDDSI Level 7)      AS: Analgesia/Sedation N/a   T: Thromboembolic Prophylaxis Therapeutic enoxaparin   H: HOB > 300 Yes   U: Stress Ulcer Prophylaxis (if needed) N/a   G: Glucose Control N/a   B: Bowel Function Stool Occurrence: 1   I: Indwelling Catheter (Lines & Bass) Necessity Yes, urinary retention   D: De-escalation of Antimicrobials/Pharmacotherapies No    Plan for the day/ETD Continue abx, f/u UA, step down to med onc    Code Status:  Family/Goals of Care: Full Code         Critical secondary to Patient has a condition that poses threat to life and bodily function: Pulmonary Embolism      Critical care was time spent personally by me on the following activities: development of treatment  plan with patient or surrogate and bedside caregivers, discussions with consultants, evaluation of patient's response to treatment, examination of patient, ordering and performing treatments and interventions, ordering and review of laboratory studies, ordering and review of radiographic studies, pulse oximetry, re-evaluation of patient's condition. This critical care time did not overlap with that of any other provider or involve time for any procedures.     Rudy Lemon MD  Critical Care Medicine  [facility]

## 2019-05-21 NOTE — ASSESSMENT & PLAN NOTE
Secondary to pancreatic mass. Stent in place. T bili 2.3 on presentation. Pancreatic duct 1.1cm on US. No acute intervention. Will monitor.  - T bili now 2.1

## 2019-05-21 NOTE — HOSPITAL COURSE
Patient was admitted to ICU service for sepsis with hypotension requiring norepinephrine and lactic acidosis. Lovenox was started at 1 mg/kg Q12h. Following volume resuscitation, she was found to be severely anemic and received 1U PRBCs. Further workup included CT of the abdomen and pelvis which revealed emphysematous cystitis, new severe hepatic steatosis, and colonic wall thickening. Abdominal ultrasound revealed 1.1cm pancreatic duct. She was unable to urinate and received a Bass which produced 1L of urine with UA pending. She was transitioned off norepinephrine overnight 5/20 with stable blood pressure. Weaned off oxygen by AM of 5/21. Patient continued to no particular complaints into 5/21. She is being treated empirically for cystitis with vancomycin and zosyn and continuing on therapeutic lovenox. Stable for transfer to medical oncology service today.

## 2019-05-21 NOTE — SUBJECTIVE & OBJECTIVE
Interval History/Significant Events: Weaned off levophed last night and oxygen this morning. Bass inserted for retention. UA pending. CT shows emphysematous cystitis, possible colitis, and new severe hepatic steatosis. Received 1U PRBCs overnight with appropriate response.    Review of Systems   Constitutional: Positive for fatigue. Negative for chills, diaphoresis and fever.   HENT: Negative for congestion and rhinorrhea.    Eyes: Negative for pain and visual disturbance.   Respiratory: Positive for cough. Negative for apnea, choking and shortness of breath.    Cardiovascular: Negative for chest pain, palpitations and leg swelling.   Gastrointestinal: Negative for abdominal pain, blood in stool, nausea and vomiting.   Genitourinary: Negative for dysuria, flank pain and hematuria.   Musculoskeletal: Negative for arthralgias and gait problem.   Skin: Negative for color change, pallor and rash.   Neurological: Negative for dizziness, syncope, light-headedness and headaches.   Psychiatric/Behavioral: Negative for agitation, behavioral problems and confusion.     Objective:     Vital Signs (Most Recent):  Temp: 97.7 °F (36.5 °C) (05/21/19 0700)  Pulse: 71 (05/21/19 1000)  Resp: 15 (05/21/19 1000)  BP: 110/60 (05/21/19 1000)  SpO2: 96 % (05/21/19 1000) Vital Signs (24h Range):  Temp:  [97.7 °F (36.5 °C)-98.9 °F (37.2 °C)] 97.7 °F (36.5 °C)  Pulse:  [63-84] 71  Resp:  [11-31] 15  SpO2:  [95 %-100 %] 96 %  BP: ()/(48-84) 110/60   Weight: 63.5 kg (140 lb)  Body mass index is 24.8 kg/m².      Intake/Output Summary (Last 24 hours) at 5/21/2019 1046  Last data filed at 5/21/2019 1000  Gross per 24 hour   Intake 5246.88 ml   Output 1115 ml   Net 4131.88 ml       Physical Exam   Constitutional: She is oriented to person, place, and time. She appears well-developed and well-nourished. No distress.   HENT:   Head: Normocephalic and atraumatic.   Right Ear: External ear normal.   Left Ear: External ear normal.   Eyes: Pupils  are equal, round, and reactive to light. Conjunctivae and EOM are normal.   Neck: Normal range of motion.   Cardiovascular: Normal rate and regular rhythm.   No murmur heard.  Pulmonary/Chest: Effort normal and breath sounds normal.   Abdominal: Soft. She exhibits no distension. There is no tenderness.   Musculoskeletal: She exhibits edema.   Neurological: She is alert and oriented to person, place, and time.   Skin: Skin is warm and dry. Capillary refill takes less than 2 seconds. She is not diaphoretic. No erythema.   Psychiatric: She has a normal mood and affect. Her behavior is normal.       Vents:     Lines/Drains/Airways     Central Venous Catheter Line                 Port A Cath Single Lumen 02/22/19 1519 right internal jugular 87 days          Drain                 Urethral Catheter 05/20/19 2200 less than 1 day          Peripheral Intravenous Line                 Peripheral IV - Single Lumen 05/20/19 0952 22 G Right Hand 1 day         Peripheral IV - Single Lumen 05/20/19 1657 20 G Right Antecubital less than 1 day              Significant Labs:    CBC/Anemia Profile:  Recent Labs   Lab 05/20/19  1700 05/20/19  1701 05/20/19 2058 05/21/19  0048   WBC 5.62  --  5.76 5.29   HGB 6.4*  --  6.2* 7.6*   HCT 19.3* 17* 19.5* 24.8*   *  --  101* 97*   MCV 94  --  99* 96   RDW 20.7*  --  21.7* 21.6*        Chemistries:  Recent Labs   Lab 05/20/19  1011  05/20/19  1618 05/20/19 2058 05/21/19  0048 05/21/19  0332      < > 137 139 135* 138   K 2.9*   < > 3.0* 3.2* 4.1 4.4      < > 112* 116* 113* 114*   CO2 17*   < > 18* 15* 17* 18*   BUN 32*   < > 27* 24* 24* 24*   CREATININE 1.2   < > 1.0 1.0 0.9 0.9   CALCIUM 7.8*   < > 6.6* 6.4* 6.8* 7.3*   ALBUMIN 2.1*  --   --  1.7*  --  1.6*   PROT 5.1*  --   --  4.1*  --  4.0*   BILITOT 2.3*  --   --  1.9*  --  2.1*   ALKPHOS 182*  --   --  150*  --  141*   ALT 56*  --   --  50*  --  46*   AST 77*  --   --  74*  --  61*   MG 0.9*   < > 1.0*  --  1.6 2.1    PHOS 3.7  --   --   --  3.7 3.5    < > = values in this interval not displayed.       Coagulation:   Recent Labs   Lab 05/20/19  1700   INR 2.2*   APTT 45.7*       Significant Imaging:  I have reviewed all pertinent imaging results/findings within the past 24 hours.

## 2019-05-21 NOTE — PLAN OF CARE
Problem: Adult Inpatient Plan of Care  Goal: Plan of Care Review  Outcome: Ongoing (interventions implemented as appropriate)  Pt had no c/o pain. Bass in place. Zosyn given. Family at bedside. Frequent rounds made to assess pain and safety. Patient remained free from falls. Bed in lowest position. Side rails up X 2. Call light within reach. Will continue to monitor.

## 2019-05-22 LAB
ALBUMIN SERPL BCP-MCNC: 1.6 G/DL (ref 3.5–5.2)
ALP SERPL-CCNC: 150 U/L (ref 55–135)
ALT SERPL W/O P-5'-P-CCNC: 45 U/L (ref 10–44)
ANION GAP SERPL CALC-SCNC: 6 MMOL/L (ref 8–16)
AST SERPL-CCNC: 46 U/L (ref 10–40)
BACTERIA UR CULT: NORMAL
BASOPHILS # BLD AUTO: 0.01 K/UL (ref 0–0.2)
BASOPHILS # BLD AUTO: 0.01 K/UL (ref 0–0.2)
BASOPHILS NFR BLD: 0.2 % (ref 0–1.9)
BASOPHILS NFR BLD: 0.2 % (ref 0–1.9)
BILIRUB SERPL-MCNC: 1.1 MG/DL (ref 0.1–1)
BUN SERPL-MCNC: 21 MG/DL (ref 8–23)
CALCIUM SERPL-MCNC: 7.8 MG/DL (ref 8.7–10.5)
CHLORIDE SERPL-SCNC: 113 MMOL/L (ref 95–110)
CO2 SERPL-SCNC: 20 MMOL/L (ref 23–29)
CREAT SERPL-MCNC: 0.9 MG/DL (ref 0.5–1.4)
DIFFERENTIAL METHOD: ABNORMAL
DIFFERENTIAL METHOD: ABNORMAL
EOSINOPHIL # BLD AUTO: 0 K/UL (ref 0–0.5)
EOSINOPHIL # BLD AUTO: 0.1 K/UL (ref 0–0.5)
EOSINOPHIL NFR BLD: 0.7 % (ref 0–8)
EOSINOPHIL NFR BLD: 0.9 % (ref 0–8)
ERYTHROCYTE [DISTWIDTH] IN BLOOD BY AUTOMATED COUNT: 23.7 % (ref 11.5–14.5)
ERYTHROCYTE [DISTWIDTH] IN BLOOD BY AUTOMATED COUNT: 23.9 % (ref 11.5–14.5)
EST. GFR  (AFRICAN AMERICAN): >60 ML/MIN/1.73 M^2
EST. GFR  (NON AFRICAN AMERICAN): >60 ML/MIN/1.73 M^2
GLUCOSE SERPL-MCNC: 149 MG/DL (ref 70–110)
HCT VFR BLD AUTO: 23.2 % (ref 37–48.5)
HCT VFR BLD AUTO: 24.7 % (ref 37–48.5)
HGB BLD-MCNC: 7.5 G/DL (ref 12–16)
HGB BLD-MCNC: 8.2 G/DL (ref 12–16)
IMM GRANULOCYTES # BLD AUTO: 0.02 K/UL (ref 0–0.04)
IMM GRANULOCYTES # BLD AUTO: 0.06 K/UL (ref 0–0.04)
IMM GRANULOCYTES NFR BLD AUTO: 0.4 % (ref 0–0.5)
IMM GRANULOCYTES NFR BLD AUTO: 1 % (ref 0–0.5)
LYMPHOCYTES # BLD AUTO: 0.5 K/UL (ref 1–4.8)
LYMPHOCYTES # BLD AUTO: 0.5 K/UL (ref 1–4.8)
LYMPHOCYTES NFR BLD: 10.7 % (ref 18–48)
LYMPHOCYTES NFR BLD: 9.3 % (ref 18–48)
MAGNESIUM SERPL-MCNC: 1.9 MG/DL (ref 1.6–2.6)
MCH RBC QN AUTO: 29.2 PG (ref 27–31)
MCH RBC QN AUTO: 29.6 PG (ref 27–31)
MCHC RBC AUTO-ENTMCNC: 32.3 G/DL (ref 32–36)
MCHC RBC AUTO-ENTMCNC: 33.2 G/DL (ref 32–36)
MCV RBC AUTO: 89 FL (ref 82–98)
MCV RBC AUTO: 90 FL (ref 82–98)
MONOCYTES # BLD AUTO: 0.4 K/UL (ref 0.3–1)
MONOCYTES # BLD AUTO: 0.5 K/UL (ref 0.3–1)
MONOCYTES NFR BLD: 7.8 % (ref 4–15)
MONOCYTES NFR BLD: 8.2 % (ref 4–15)
NEUTROPHILS # BLD AUTO: 3.6 K/UL (ref 1.8–7.7)
NEUTROPHILS # BLD AUTO: 4.7 K/UL (ref 1.8–7.7)
NEUTROPHILS NFR BLD: 79.8 % (ref 38–73)
NEUTROPHILS NFR BLD: 80.8 % (ref 38–73)
NRBC BLD-RTO: 0 /100 WBC
NRBC BLD-RTO: 0 /100 WBC
PHOSPHATE SERPL-MCNC: 2.9 MG/DL (ref 2.7–4.5)
PLATELET # BLD AUTO: 112 K/UL (ref 150–350)
PLATELET # BLD AUTO: 91 K/UL (ref 150–350)
PMV BLD AUTO: 11.2 FL (ref 9.2–12.9)
PMV BLD AUTO: 11.6 FL (ref 9.2–12.9)
POTASSIUM SERPL-SCNC: 4 MMOL/L (ref 3.5–5.1)
PROT SERPL-MCNC: 4.3 G/DL (ref 6–8.4)
RBC # BLD AUTO: 2.57 M/UL (ref 4–5.4)
RBC # BLD AUTO: 2.77 M/UL (ref 4–5.4)
SODIUM SERPL-SCNC: 139 MMOL/L (ref 136–145)
WBC # BLD AUTO: 4.49 K/UL (ref 3.9–12.7)
WBC # BLD AUTO: 5.8 K/UL (ref 3.9–12.7)

## 2019-05-22 PROCEDURE — 25000003 PHARM REV CODE 250: Performed by: INTERNAL MEDICINE

## 2019-05-22 PROCEDURE — 99232 PR SUBSEQUENT HOSPITAL CARE,LEVL II: ICD-10-PCS | Mod: ,,, | Performed by: INTERNAL MEDICINE

## 2019-05-22 PROCEDURE — 85025 COMPLETE CBC W/AUTO DIFF WBC: CPT

## 2019-05-22 PROCEDURE — 80053 COMPREHEN METABOLIC PANEL: CPT

## 2019-05-22 PROCEDURE — 83735 ASSAY OF MAGNESIUM: CPT

## 2019-05-22 PROCEDURE — 84100 ASSAY OF PHOSPHORUS: CPT

## 2019-05-22 PROCEDURE — 20600001 HC STEP DOWN PRIVATE ROOM

## 2019-05-22 PROCEDURE — 25000003 PHARM REV CODE 250: Performed by: STUDENT IN AN ORGANIZED HEALTH CARE EDUCATION/TRAINING PROGRAM

## 2019-05-22 PROCEDURE — 63600175 PHARM REV CODE 636 W HCPCS: Performed by: STUDENT IN AN ORGANIZED HEALTH CARE EDUCATION/TRAINING PROGRAM

## 2019-05-22 PROCEDURE — 99232 SBSQ HOSP IP/OBS MODERATE 35: CPT | Mod: ,,, | Performed by: INTERNAL MEDICINE

## 2019-05-22 RX ORDER — ENOXAPARIN SODIUM 100 MG/ML
60 INJECTION SUBCUTANEOUS EVERY 12 HOURS
Qty: 36 ML | Refills: 0 | Status: SHIPPED | OUTPATIENT
Start: 2019-05-22 | End: 2019-06-21

## 2019-05-22 RX ORDER — DIPHENOXYLATE HYDROCHLORIDE AND ATROPINE SULFATE 2.5; .025 MG/1; MG/1
1 TABLET ORAL 4 TIMES DAILY PRN
Qty: 30 TABLET | Refills: 0 | Status: SHIPPED | OUTPATIENT
Start: 2019-05-22

## 2019-05-22 RX ORDER — BISOPROLOL FUMARATE AND HYDROCHLOROTHIAZIDE 10; 6.25 MG/1; MG/1
1 TABLET ORAL 2 TIMES DAILY
Refills: 1
Start: 2019-05-22

## 2019-05-22 RX ORDER — HEPARIN 100 UNIT/ML
300 SYRINGE INTRAVENOUS
Status: DISCONTINUED | OUTPATIENT
Start: 2019-05-22 | End: 2019-05-23 | Stop reason: HOSPADM

## 2019-05-22 RX ORDER — LORAZEPAM 1 MG/1
1 TABLET ORAL EVERY 8 HOURS PRN
Qty: 30 TABLET | Refills: 0 | Status: SHIPPED | OUTPATIENT
Start: 2019-05-22

## 2019-05-22 RX ORDER — PANTOPRAZOLE SODIUM 40 MG/1
40 TABLET, DELAYED RELEASE ORAL DAILY
Qty: 30 TABLET | Refills: 0 | Status: SHIPPED | OUTPATIENT
Start: 2019-05-22

## 2019-05-22 RX ORDER — PROMETHAZINE HYDROCHLORIDE 12.5 MG/1
25 TABLET ORAL EVERY 6 HOURS PRN
Qty: 40 TABLET | Refills: 0 | Status: SHIPPED | OUTPATIENT
Start: 2019-05-22 | End: 2019-05-23 | Stop reason: HOSPADM

## 2019-05-22 RX ORDER — HYDRALAZINE HYDROCHLORIDE 50 MG/1
75 TABLET, FILM COATED ORAL 3 TIMES DAILY
Refills: 1
Start: 2019-05-22

## 2019-05-22 RX ADMIN — PROMETHAZINE HYDROCHLORIDE 12.5 MG: 12.5 TABLET ORAL at 05:05

## 2019-05-22 RX ADMIN — LOPERAMIDE HYDROCHLORIDE 2 MG: 2 CAPSULE ORAL at 08:05

## 2019-05-22 RX ADMIN — DEXAMETHASONE 2 MG: 0.5 TABLET ORAL at 05:05

## 2019-05-22 RX ADMIN — PIPERACILLIN AND TAZOBACTAM 4.5 G: 4; .5 INJECTION, POWDER, LYOPHILIZED, FOR SOLUTION INTRAVENOUS; PARENTERAL at 06:05

## 2019-05-22 RX ADMIN — DEXAMETHASONE 2 MG: 0.5 TABLET ORAL at 07:05

## 2019-05-22 RX ADMIN — ONDANSETRON 4 MG: 4 TABLET, FILM COATED ORAL at 08:05

## 2019-05-22 RX ADMIN — PROMETHAZINE HYDROCHLORIDE 12.5 MG: 12.5 TABLET ORAL at 08:05

## 2019-05-22 RX ADMIN — ENOXAPARIN SODIUM 60 MG: 100 INJECTION SUBCUTANEOUS at 12:05

## 2019-05-22 RX ADMIN — PROMETHAZINE HYDROCHLORIDE 12.5 MG: 12.5 TABLET ORAL at 10:05

## 2019-05-22 RX ADMIN — ONDANSETRON 4 MG: 4 TABLET, FILM COATED ORAL at 03:05

## 2019-05-22 RX ADMIN — PANTOPRAZOLE SODIUM 40 MG: 40 TABLET, DELAYED RELEASE ORAL at 08:05

## 2019-05-22 RX ADMIN — DIPHENOXYLATE HYDROCHLORIDE AND ATROPINE SULFATE 1 TABLET: 2.5; .025 TABLET ORAL at 08:05

## 2019-05-22 RX ADMIN — LORAZEPAM 1 MG: 1 TABLET ORAL at 08:05

## 2019-05-22 RX ADMIN — PANCRELIPASE 2 CAPSULE: 24000; 76000; 120000 CAPSULE, DELAYED RELEASE PELLETS ORAL at 05:05

## 2019-05-22 RX ADMIN — PIPERACILLIN AND TAZOBACTAM 4.5 G: 4; .5 INJECTION, POWDER, LYOPHILIZED, FOR SOLUTION INTRAVENOUS; PARENTERAL at 03:05

## 2019-05-22 RX ADMIN — PANCRELIPASE 1 CAPSULE: 24000; 76000; 120000 CAPSULE, DELAYED RELEASE PELLETS ORAL at 07:05

## 2019-05-22 RX ADMIN — ENOXAPARIN SODIUM 60 MG: 100 INJECTION SUBCUTANEOUS at 02:05

## 2019-05-22 RX ADMIN — PIPERACILLIN AND TAZOBACTAM 4.5 G: 4; .5 INJECTION, POWDER, LYOPHILIZED, FOR SOLUTION INTRAVENOUS; PARENTERAL at 10:05

## 2019-05-22 NOTE — ASSESSMENT & PLAN NOTE
S/p 1 cycle of FOLFIRINOX on 2/26/19. She had nausea, emesis and diarrhea after C1.   C2 was on 3/26/19. She continued to have severe nausea, vomiting, diarrhea.   Chemotherapy changed to Gemcitabine-Nab Paclitaxel from 4/23/19. S/P cycle 1 on 5/7/2019

## 2019-05-22 NOTE — ASSESSMENT & PLAN NOTE
Her diarrhea has markedly improved. C.diff negative  Had 1 episode last night  Has imodium PRN  Will increase Creon to 2 capsule TID

## 2019-05-22 NOTE — PLAN OF CARE
Problem: Adult Inpatient Plan of Care  Goal: Plan of Care Review  Outcome: Ongoing (interventions implemented as appropriate)  Plan of care reviewed with the patient at the beginning of shift. The patient complained of nausea X1. Zofran administered. The patient had 1 BM during the night. Slept most of the night. Denying all other complaints. VSS. Bed in low locked position. Call bell within reach. Will continue to monitor.

## 2019-05-22 NOTE — PROGRESS NOTES
Therapy with vancomycin complete and/or consult discontinued by provider.  Pharmacy will sign off, please re-consult as needed.

## 2019-05-22 NOTE — PLAN OF CARE
Problem: Adult Inpatient Plan of Care  Goal: Plan of Care Review  Outcome: Ongoing (interventions implemented as appropriate)  Patient is AAOx4, up with 1 person assistance, fall precautions maintained. Bed alarm set. Patient  Complained of nausea, PRN Zofran and phenergan given. Pt had no c/o pain. Bass discontinued today. Zosyn given as ordered. Family at bedside. Frequent rounds made to assess pain and safety. Patient remained free from falls. Bed in lowest position. Side rails up X 2. Call light within reach. Will continue to monitor.

## 2019-05-22 NOTE — PROGRESS NOTES
[facility]  Hematology/Oncology  Progress Note    Patient Name: Roopa Hanley  Admission Date: 5/20/2019  Hospital Length of Stay: 2 days  Code Status: Full Code     Subjective:     HPI:  68F with pancreatic cancer, on active chemo, and DVT on Eliquis complains of SOB, fever, and chills developing this morning on the way to a chemo session. She has had baseline nausea and weakness secondary to chemotherapy. She states she was having increasing shortness of breath starting this morning, then episodes of nausea and nonbloody, nonbilious emesis when getting into the car. Daughter notes chills and lethargy during the trip. She complains of nonproductive cough. Patient endorses diarrhea and abdominal pain unchanged from previous weeks. Chemotherapy last week had been deferred due to low WBC count. She denies chest pain, headache, LOC, blood in stool.     Previous history significant for pancreatic cancer diagnosed in 1/2019, currently on active chemo, with duodenal stenosis and biliary obstruction. She has a DVT in the LLE diagnosed in 4/2019 and has been on Eliquis. She also has hypertension.     In the ED, patient had a fever to 102 and was hypoxic requiring NC. Tachycardic to 120, RR 22, BP wnl. Lactic acid was 6.5 on ISTAT and 7.2 from the lab. Troponin wnl, . She was given vancomycin and zosyn as well as 1L NS and zofran. At time of my interview, patient's symptoms have significantly improved. MICU was consulted for sepsis and lactic acidosis.     Patient then went for CTA and was found to have extensive pulmonary embolisms as well as GGO in the left upper lobe.    Subjective  Patient seen and examined at bedside  She had 1 episode of loose watery stool overnight.   Denied any nausea, vomiting, SOB, chest pain, fever or chills.  Appetite, abdominal pain better today  On Vancomycin and Zosyn. Will d/c Vancomycin today    Interval History:   Transferred to floor from ICU  5/21/2019.  Bass in place for urinary retention.   CT shows emphysematous cystitis, possible colitis, and new severe hepatic steatosis.     Oncology Treatment Plan:   OP PANC NAB-PACLITAXEL + GEMCITABINE Q4W    Medications:  Continuous Infusions:  Scheduled Meds:   dexamethasone  2 mg Oral BID WM    enoxaparin  1 mg/kg Subcutaneous Q12H    lipase-protease-amylase 24,000-76,000-120,000 units  2 capsule Oral TID WM    pantoprazole  40 mg Oral Daily    piperacillin-tazobactam (ZOSYN) IVPB  4.5 g Intravenous Q8H     PRN Meds:sodium chloride, diphenoxylate-atropine 2.5-0.025 mg, heparin, porcine (PF), loperamide, LORazepam, ondansetron, oxyCODONE-acetaminophen, oxyCODONE-acetaminophen, promethazine, sodium chloride 0.9%     Review of Systems   Constitutional: Positive for fatigue. Negative for appetite change, chills and fever.   HENT: Negative for nosebleeds and trouble swallowing.    Respiratory: Negative for cough and shortness of breath.    Cardiovascular: Negative for chest pain and palpitations.   Gastrointestinal: Positive for abdominal pain and diarrhea. Negative for blood in stool, nausea and vomiting.   Genitourinary: Negative for dysuria, flank pain and frequency.   Musculoskeletal: Negative for back pain and joint swelling.   Skin: Negative for rash.   Neurological: Negative for seizures, syncope and headaches.   Hematological: Negative for adenopathy. Does not bruise/bleed easily.     Objective:     Vital Signs (Most Recent):  Temp: 98 °F (36.7 °C) (05/22/19 1113)  Pulse: 78 (05/22/19 1113)  Resp: 18 (05/22/19 1113)  BP: 139/78 (05/22/19 1113)  SpO2: 95 % (05/22/19 1113) Vital Signs (24h Range):  Temp:  [97.6 °F (36.4 °C)-98 °F (36.7 °C)] 98 °F (36.7 °C)  Pulse:  [67-78] 78  Resp:  [16-35] 18  SpO2:  [94 %-98 %] 95 %  BP: (118-142)/(61-85) 139/78     Weight: 63.5 kg (140 lb)  Body mass index is 24.8 kg/m².  Body surface area is 1.68 meters squared.      Intake/Output Summary (Last 24 hours) at  5/22/2019 1132  Last data filed at 5/21/2019 2339  Gross per 24 hour   Intake 30 ml   Output 505 ml   Net -475 ml       Physical Exam   Constitutional: She is oriented to person, place, and time. No distress.   Obese female lying in bed comfortable   HENT:   Head: Normocephalic and atraumatic.   Mouth/Throat: No oropharyngeal exudate.   Eyes: Pupils are equal, round, and reactive to light. EOM are normal. No scleral icterus.   Neck: Normal range of motion. Neck supple.   Cardiovascular: Normal rate, regular rhythm and normal heart sounds.   No murmur heard.  Pulmonary/Chest: Effort normal and breath sounds normal. No respiratory distress. She has no wheezes.   Abdominal: Soft. Bowel sounds are normal. There is tenderness.   Musculoskeletal: Normal range of motion. She exhibits edema. She exhibits no deformity.   Lymphadenopathy:     She has no cervical adenopathy.   Neurological: She is alert and oriented to person, place, and time. No cranial nerve deficit.   Skin: Skin is warm. Capillary refill takes less than 2 seconds. No rash noted. There is pallor.   Psychiatric: She has a normal mood and affect.       Significant Labs:   CBC:   Recent Labs   Lab 05/21/19  0048 05/21/19  1410 05/22/19  0300   WBC 5.29 5.48 4.49   HGB 7.6* 8.2* 7.5*   HCT 24.8* 24.7* 23.2*   PLT 97* 102* 91*   , CMP:   Recent Labs   Lab 05/20/19  2058 05/21/19  0048 05/21/19  0332 05/22/19  0400    135* 138 139   K 3.2* 4.1 4.4 4.0   * 113* 114* 113*   CO2 15* 17* 18* 20*   * 163* 146* 149*   BUN 24* 24* 24* 21   CREATININE 1.0 0.9 0.9 0.9   CALCIUM 6.4* 6.8* 7.3* 7.8*   PROT 4.1*  --  4.0* 4.3*   ALBUMIN 1.7*  --  1.6* 1.6*   BILITOT 1.9*  --  2.1* 1.1*   ALKPHOS 150*  --  141* 150*   AST 74*  --  61* 46*   ALT 50*  --  46* 45*   ANIONGAP 8 5* 6* 6*   EGFRNONAA 58.8* >60.0 >60.0 >60.0    and Coagulation:   Recent Labs   Lab 05/20/19  1700 05/21/19  1410   INR 2.2* 1.6*   APTT 45.7*  --        Diagnostic Results:    US  liver  Satisfactory Doppler evaluation of the liver.    Hepatomegaly and steatosis.    Stable pneumobilia with visualized choledochoduodenostomy in place.    Known pancreatic head mass is not well characterized, distal pancreatic duct measures up to 1.1 cm.    Small volume ascites.    CT abdomen and pelvis  Extensive nondependent air foci throughout the bladder wall concerning for emphysematous cystitis.    Interval development of severe hepatic steatosis.    Ascending colon appears fluid filled with circumferential wall thickening and mild surrounding fat stranding, findings could represent portal colopathy versus colitis.    Persistent large pancreatic head mass with distal pancreatic duct dilatation.    Stable pneumobilia with choledochoduodenostomy and duodenal stent in place    Small volume ascites, increased as compared to PET-CT.    US lower extremity  Small chronic nonocclusive deep venous thrombus in the left common femoral vein.  Patient with known left lower extremity DVT on outside imaging and currently being treated with Eliquis.    Assessment/Plan:     * Acute pulmonary embolism  Patient with pancreatic cancer and known LLE DVT, on Eliquis, presents with multiple acute bilateral PE. Presented with fever, tachycardia, tachypnea, lethargy. Patient is hemodynamically stable with new oxygen requirements. No evidence of RH strain on bedside echo. Troponin wnl,  on presentation with lactic acid 7.2.     Plan  - Started on Lovenox 1 mg/kg Q12h. Will continue Lovenox at discharge  - Telemetry   - Weaned off O2      Emphysematous cystitis  See sepsis    Sepsis  Patient with known cancer and PE with DVT presents with sepsis. Multiple possible sources; CT revealed emphysematous cystitis, bowel wall thickening, and focal ground glass opacities on chest CT. Patient does endorse mild nonproductive cough and chronic diarrhea since starting chemo. Denies urinary complaints.     Plan  - Lactic acidosis  resolved  - Blood cultures NGTD  - On vanc and zosyn. Will discontinue Vancomycin          Diarrhea  Her diarrhea has markedly improved. C.diff negative  Had 1 episode last night  Has imodium PRN  Will increase Creon to 2 capsule TID    Hypomagnesemia  Replete PRN    Hypokalemia  Replete PRN    Anemia in neoplastic disease  Transfuse for Hgb <7   Current Hb stable at 7.5    Pancreatic adenocarcinoma  S/p 1 cycle of FOLFIRINOX on 2/26/19. She had nausea, emesis and diarrhea after C1.   C2 was on 3/26/19. She continued to have severe nausea, vomiting, diarrhea.   Chemotherapy changed to Gemcitabine-Nab Paclitaxel from 4/23/19. S/P cycle 1 on 5/7/2019    Biliary obstruction  Secondary to pancreatic mass. Stent in place. T bili 2.3 on presentation. Pancreatic duct 1.1cm on US. No acute intervention.   - T bili now 1.1. Will monitor.    Essential hypertension  Will hold home hypertensives due to new PE and presser requirements on admission  BP stable  PRN hydralazine in place. Will add home meds if BP increase             Christ Ulrich MD  Hematology/Oncology  [facility]

## 2019-05-22 NOTE — ASSESSMENT & PLAN NOTE
Secondary to pancreatic mass. Stent in place. T bili 2.3 on presentation. Pancreatic duct 1.1cm on US. No acute intervention.   - T bili now 1.1. Will monitor.

## 2019-05-22 NOTE — PROGRESS NOTES
Admit Assessment    Patient Identification  Roopa Hanley   :  1950  Admit Date:  2019  Attending Provider:  Celestino Thompson MD              Referral: (Patient transferred from the ICU to the Oncology team today)  Pt has a diagnosis of Pancreatic Cancer and was admitted this hospital stay due to  Acute pulmonary embolism,  Shortness of breath [R06.02]  Hypokalemia [E87.6]  Lactic acidosis [E87.2]  Hypomagnesemia [E83.42]  Fever and chills [R50.9]  Pulmonary embolus [I26.99]  Bilateral pulmonary embolism [I26.99]  Sepsis [A41.9]  Malignant neoplasm of pancreas, unspecified location of malignancy [C25.9]  Malignant neoplasm of pancreas, unspecified location of malignancy [C25.9].  Oncology social worker is involved for psychosocial services.  Patient presents as a 68 y.o. year old  female.    Persons interviewed: Patient    Living Situation:     Lives with her daughter, Erika Maloney, son in law and their two children ages 18 and 5 at [address] [place], [place] [address]e [place] [address] , phone: [phone] (home).           Current or Past Agencies and Description of Services/Supplies    DME    Equipment Currently Used at Home: shower chair, walker, rolling, walker, standard, other (see comments)(seated RW)    Home Health  None    IV Infusion  None    Nutrition: Oral    Outpatient Pharmacy:     [facility] [address] [place] [address] AT [address] [address]  Phone: [phone] Fax: [phone]      Patient Preference of agencies include: As stated above    Patient/Caregiver informed of right to choose providers or agencies.  Patient provides permission to release any necessary [facility] and to Non-[facility] agencies as needed to facilitate patient care, treatment planning, and patient discharge planning.  Written and verbal resources provided.              Adjustment to Diagnosis and Treatment  Appropriate - the  "patient stated that she has very good support from her daughter who took off from work to stay with her. The patient also has a son living close to her.  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 She informed that she and her daughter "cried together" about her illness. Provided supportive counseling.             History/Current Symptoms of Anxiety/Depression: No  History/Current Substance Use:   Social History     Tobacco Use    Smoking status: Never Smoker    Smokeless tobacco: Never Used   Substance and Sexual Activity    Alcohol use: No     Frequency: Never    Drug use: No    Sexual activity: Not on file       Indications of Abuse/Neglect: No       Financial:  Payor/Plan Subscr  Sex Relation Sub. Ins. ID Effective Group Num   1. BLUE CROSS BL* STEPHANIESHAO* 1950 Female  [id number]* [id number]                                    [address] [address]                            Other identified concerns/needs: None    Plan:    Interventions/Referrals: Offered services to the patient and provided her with contact information. Will follow as needed for supportive counseling and discharge arrangements.   Patient/caregiver engaged in treatment planning process.     providing psychosocial and supportive counseling, resources, education, assistance and discharge planning as appropriate.  Patient/caregiver state understanding of  available resources,  following, remains available.                           "

## 2019-05-22 NOTE — ASSESSMENT & PLAN NOTE
Will hold home hypertensives due to new PE and presser requirements on admission  BP stable  PRN hydralazine in place. Will add home meds if BP increase

## 2019-05-22 NOTE — ASSESSMENT & PLAN NOTE
Patient with pancreatic cancer and known LLE DVT, on Eliquis, presents with multiple acute bilateral PE. Presented with fever, tachycardia, tachypnea, lethargy. Patient is hemodynamically stable with new oxygen requirements. No evidence of RH strain on bedside echo. Troponin wnl,  on presentation with lactic acid 7.2.     Plan  - Started on Lovenox 1 mg/kg Q12h. Will continue Lovenox at discharge  - Telemetry   - Weaned off O2

## 2019-05-22 NOTE — ASSESSMENT & PLAN NOTE
Patient with known cancer and PE with DVT presents with sepsis. Multiple possible sources; CT revealed emphysematous cystitis, bowel wall thickening, and focal ground glass opacities on chest CT. Patient does endorse mild nonproductive cough and chronic diarrhea since starting chemo. Denies urinary complaints.     Plan  - Lactic acidosis resolved  - Blood cultures NGTD  - On vanc and zosyn. Will discontinue Vancomycin

## 2019-05-22 NOTE — SUBJECTIVE & OBJECTIVE
Subjective  Patient seen and examined at bedside  She had 1 episode of loose watery stool overnight.   Denied any nausea, vomiting, SOB, chest pain, fever or chills.  Appetite, abdominal pain better today  On Vancomycin and Zosyn. Will d/c Vancomycin today    Interval History:   Transferred to floor from ICU 5/21/2019.  Bass in place for urinary retention.   CT shows emphysematous cystitis, possible colitis, and new severe hepatic steatosis.     Oncology Treatment Plan:   OP PANC NAB-PACLITAXEL + GEMCITABINE Q4W    Medications:  Continuous Infusions:  Scheduled Meds:   dexamethasone  2 mg Oral BID WM    enoxaparin  1 mg/kg Subcutaneous Q12H    lipase-protease-amylase 24,000-76,000-120,000 units  2 capsule Oral TID WM    pantoprazole  40 mg Oral Daily    piperacillin-tazobactam (ZOSYN) IVPB  4.5 g Intravenous Q8H     PRN Meds:sodium chloride, diphenoxylate-atropine 2.5-0.025 mg, heparin, porcine (PF), loperamide, LORazepam, ondansetron, oxyCODONE-acetaminophen, oxyCODONE-acetaminophen, promethazine, sodium chloride 0.9%     Review of Systems   Constitutional: Positive for fatigue. Negative for appetite change, chills and fever.   HENT: Negative for nosebleeds and trouble swallowing.    Respiratory: Negative for cough and shortness of breath.    Cardiovascular: Negative for chest pain and palpitations.   Gastrointestinal: Positive for abdominal pain and diarrhea. Negative for blood in stool, nausea and vomiting.   Genitourinary: Negative for dysuria, flank pain and frequency.   Musculoskeletal: Negative for back pain and joint swelling.   Skin: Negative for rash.   Neurological: Negative for seizures, syncope and headaches.   Hematological: Negative for adenopathy. Does not bruise/bleed easily.     Objective:     Vital Signs (Most Recent):  Temp: 98 °F (36.7 °C) (05/22/19 1113)  Pulse: 78 (05/22/19 1113)  Resp: 18 (05/22/19 1113)  BP: 139/78 (05/22/19 1113)  SpO2: 95 % (05/22/19 1113) Vital Signs (24h  Range):  Temp:  [97.6 °F (36.4 °C)-98 °F (36.7 °C)] 98 °F (36.7 °C)  Pulse:  [67-78] 78  Resp:  [16-35] 18  SpO2:  [94 %-98 %] 95 %  BP: (118-142)/(61-85) 139/78     Weight: 63.5 kg (140 lb)  Body mass index is 24.8 kg/m².  Body surface area is 1.68 meters squared.      Intake/Output Summary (Last 24 hours) at 5/22/2019 1132  Last data filed at 5/21/2019 2339  Gross per 24 hour   Intake 30 ml   Output 505 ml   Net -475 ml       Physical Exam   Constitutional: She is oriented to person, place, and time. No distress.   Obese female lying in bed comfortable   HENT:   Head: Normocephalic and atraumatic.   Mouth/Throat: No oropharyngeal exudate.   Eyes: Pupils are equal, round, and reactive to light. EOM are normal. No scleral icterus.   Neck: Normal range of motion. Neck supple.   Cardiovascular: Normal rate, regular rhythm and normal heart sounds.   No murmur heard.  Pulmonary/Chest: Effort normal and breath sounds normal. No respiratory distress. She has no wheezes.   Abdominal: Soft. Bowel sounds are normal. There is tenderness.   Musculoskeletal: Normal range of motion. She exhibits edema. She exhibits no deformity.   Lymphadenopathy:     She has no cervical adenopathy.   Neurological: She is alert and oriented to person, place, and time. No cranial nerve deficit.   Skin: Skin is warm. Capillary refill takes less than 2 seconds. No rash noted. There is pallor.   Psychiatric: She has a normal mood and affect.       Significant Labs:   CBC:   Recent Labs   Lab 05/21/19  0048 05/21/19  1410 05/22/19  0300   WBC 5.29 5.48 4.49   HGB 7.6* 8.2* 7.5*   HCT 24.8* 24.7* 23.2*   PLT 97* 102* 91*   , CMP:   Recent Labs   Lab 05/20/19  2058 05/21/19  0048 05/21/19  0332 05/22/19  0400    135* 138 139   K 3.2* 4.1 4.4 4.0   * 113* 114* 113*   CO2 15* 17* 18* 20*   * 163* 146* 149*   BUN 24* 24* 24* 21   CREATININE 1.0 0.9 0.9 0.9   CALCIUM 6.4* 6.8* 7.3* 7.8*   PROT 4.1*  --  4.0* 4.3*   ALBUMIN 1.7*  --  1.6*  1.6*   BILITOT 1.9*  --  2.1* 1.1*   ALKPHOS 150*  --  141* 150*   AST 74*  --  61* 46*   ALT 50*  --  46* 45*   ANIONGAP 8 5* 6* 6*   EGFRNONAA 58.8* >60.0 >60.0 >60.0    and Coagulation:   Recent Labs   Lab 05/20/19  1700 05/21/19  1410   INR 2.2* 1.6*   APTT 45.7*  --        Diagnostic Results:    US liver  Satisfactory Doppler evaluation of the liver.    Hepatomegaly and steatosis.    Stable pneumobilia with visualized choledochoduodenostomy in place.    Known pancreatic head mass is not well characterized, distal pancreatic duct measures up to 1.1 cm.    Small volume ascites.    CT abdomen and pelvis  Extensive nondependent air foci throughout the bladder wall concerning for emphysematous cystitis.    Interval development of severe hepatic steatosis.    Ascending colon appears fluid filled with circumferential wall thickening and mild surrounding fat stranding, findings could represent portal colopathy versus colitis.    Persistent large pancreatic head mass with distal pancreatic duct dilatation.    Stable pneumobilia with choledochoduodenostomy and duodenal stent in place    Small volume ascites, increased as compared to PET-CT.    US lower extremity  Small chronic nonocclusive deep venous thrombus in the left common femoral vein.  Patient with known left lower extremity DVT on outside imaging and currently being treated with Eliquis.

## 2019-05-23 VITALS
BODY MASS INDEX: 24.8 KG/M2 | OXYGEN SATURATION: 96 % | TEMPERATURE: 99 F | RESPIRATION RATE: 16 BRPM | WEIGHT: 140 LBS | DIASTOLIC BLOOD PRESSURE: 91 MMHG | HEART RATE: 86 BPM | HEIGHT: 63 IN | SYSTOLIC BLOOD PRESSURE: 169 MMHG

## 2019-05-23 PROBLEM — Z51.5 PALLIATIVE CARE ENCOUNTER: Status: ACTIVE | Noted: 2019-05-23

## 2019-05-23 LAB
ALBUMIN SERPL BCP-MCNC: 1.7 G/DL (ref 3.5–5.2)
ALP SERPL-CCNC: 162 U/L (ref 55–135)
ALT SERPL W/O P-5'-P-CCNC: 50 U/L (ref 10–44)
ANION GAP SERPL CALC-SCNC: 8 MMOL/L (ref 8–16)
AST SERPL-CCNC: 60 U/L (ref 10–40)
BASOPHILS # BLD AUTO: 0.02 K/UL (ref 0–0.2)
BASOPHILS NFR BLD: 0.4 % (ref 0–1.9)
BILIRUB SERPL-MCNC: 1 MG/DL (ref 0.1–1)
BUN SERPL-MCNC: 17 MG/DL (ref 8–23)
CALCIUM SERPL-MCNC: 7.9 MG/DL (ref 8.7–10.5)
CHLORIDE SERPL-SCNC: 113 MMOL/L (ref 95–110)
CO2 SERPL-SCNC: 17 MMOL/L (ref 23–29)
CREAT SERPL-MCNC: 0.8 MG/DL (ref 0.5–1.4)
DIFFERENTIAL METHOD: ABNORMAL
EOSINOPHIL # BLD AUTO: 0.1 K/UL (ref 0–0.5)
EOSINOPHIL NFR BLD: 1.6 % (ref 0–8)
ERYTHROCYTE [DISTWIDTH] IN BLOOD BY AUTOMATED COUNT: 24.7 % (ref 11.5–14.5)
EST. GFR  (AFRICAN AMERICAN): >60 ML/MIN/1.73 M^2
EST. GFR  (NON AFRICAN AMERICAN): >60 ML/MIN/1.73 M^2
GLUCOSE SERPL-MCNC: 86 MG/DL (ref 70–110)
HCT VFR BLD AUTO: 25.3 % (ref 37–48.5)
HGB BLD-MCNC: 7.8 G/DL (ref 12–16)
IMM GRANULOCYTES # BLD AUTO: 0.04 K/UL (ref 0–0.04)
IMM GRANULOCYTES NFR BLD AUTO: 0.8 % (ref 0–0.5)
LYMPHOCYTES # BLD AUTO: 0.7 K/UL (ref 1–4.8)
LYMPHOCYTES NFR BLD: 13.7 % (ref 18–48)
MAGNESIUM SERPL-MCNC: 1.5 MG/DL (ref 1.6–2.6)
MCH RBC QN AUTO: 29.4 PG (ref 27–31)
MCHC RBC AUTO-ENTMCNC: 30.8 G/DL (ref 32–36)
MCV RBC AUTO: 96 FL (ref 82–98)
MONOCYTES # BLD AUTO: 0.5 K/UL (ref 0.3–1)
MONOCYTES NFR BLD: 10 % (ref 4–15)
NEUTROPHILS # BLD AUTO: 3.6 K/UL (ref 1.8–7.7)
NEUTROPHILS NFR BLD: 73.5 % (ref 38–73)
NRBC BLD-RTO: 1 /100 WBC
PHOSPHATE SERPL-MCNC: 2.9 MG/DL (ref 2.7–4.5)
PLATELET # BLD AUTO: 107 K/UL (ref 150–350)
PMV BLD AUTO: 11.7 FL (ref 9.2–12.9)
POTASSIUM SERPL-SCNC: 3.6 MMOL/L (ref 3.5–5.1)
PROT SERPL-MCNC: 4.5 G/DL (ref 6–8.4)
RBC # BLD AUTO: 2.65 M/UL (ref 4–5.4)
SODIUM SERPL-SCNC: 138 MMOL/L (ref 136–145)
WBC # BLD AUTO: 4.9 K/UL (ref 3.9–12.7)

## 2019-05-23 PROCEDURE — 83735 ASSAY OF MAGNESIUM: CPT

## 2019-05-23 PROCEDURE — 63600175 PHARM REV CODE 636 W HCPCS: Performed by: STUDENT IN AN ORGANIZED HEALTH CARE EDUCATION/TRAINING PROGRAM

## 2019-05-23 PROCEDURE — 99223 PR INITIAL HOSPITAL CARE,LEVL III: ICD-10-PCS | Mod: ,,, | Performed by: CLINICAL NURSE SPECIALIST

## 2019-05-23 PROCEDURE — 85025 COMPLETE CBC W/AUTO DIFF WBC: CPT

## 2019-05-23 PROCEDURE — 99233 SBSQ HOSP IP/OBS HIGH 50: CPT | Mod: ,,, | Performed by: INTERNAL MEDICINE

## 2019-05-23 PROCEDURE — 25000003 PHARM REV CODE 250: Performed by: STUDENT IN AN ORGANIZED HEALTH CARE EDUCATION/TRAINING PROGRAM

## 2019-05-23 PROCEDURE — 97165 OT EVAL LOW COMPLEX 30 MIN: CPT

## 2019-05-23 PROCEDURE — 25000003 PHARM REV CODE 250: Performed by: INTERNAL MEDICINE

## 2019-05-23 PROCEDURE — 80053 COMPREHEN METABOLIC PANEL: CPT

## 2019-05-23 PROCEDURE — 99223 1ST HOSP IP/OBS HIGH 75: CPT | Mod: ,,, | Performed by: CLINICAL NURSE SPECIALIST

## 2019-05-23 PROCEDURE — 99233 PR SUBSEQUENT HOSPITAL CARE,LEVL III: ICD-10-PCS | Mod: ,,, | Performed by: INTERNAL MEDICINE

## 2019-05-23 PROCEDURE — 97535 SELF CARE MNGMENT TRAINING: CPT

## 2019-05-23 PROCEDURE — 84100 ASSAY OF PHOSPHORUS: CPT

## 2019-05-23 PROCEDURE — 63600175 PHARM REV CODE 636 W HCPCS: Performed by: INTERNAL MEDICINE

## 2019-05-23 RX ORDER — PROMETHAZINE HYDROCHLORIDE 12.5 MG/1
12.5 TABLET ORAL EVERY 6 HOURS
Start: 2019-05-23

## 2019-05-23 RX ORDER — PROMETHAZINE HYDROCHLORIDE 25 MG/1
25 TABLET ORAL EVERY 6 HOURS PRN
Status: DISCONTINUED | OUTPATIENT
Start: 2019-05-23 | End: 2019-05-23

## 2019-05-23 RX ORDER — LORAZEPAM 1 MG/1
1 TABLET ORAL NIGHTLY
Qty: 30 TABLET | Refills: 0
Start: 2019-05-23 | End: 2019-06-22

## 2019-05-23 RX ORDER — ONDANSETRON 4 MG/1
4 TABLET, FILM COATED ORAL EVERY 6 HOURS
Start: 2019-05-23

## 2019-05-23 RX ORDER — PROMETHAZINE HYDROCHLORIDE 12.5 MG/1
12.5 TABLET ORAL
Status: DISCONTINUED | OUTPATIENT
Start: 2019-05-23 | End: 2019-05-23 | Stop reason: HOSPADM

## 2019-05-23 RX ORDER — CIPROFLOXACIN 500 MG/1
500 TABLET ORAL EVERY 12 HOURS
Status: DISCONTINUED | OUTPATIENT
Start: 2019-05-23 | End: 2019-05-23 | Stop reason: HOSPADM

## 2019-05-23 RX ORDER — LORAZEPAM 1 MG/1
1 TABLET ORAL EVERY 8 HOURS PRN
Status: DISCONTINUED | OUTPATIENT
Start: 2019-05-23 | End: 2019-05-23 | Stop reason: HOSPADM

## 2019-05-23 RX ORDER — LOPERAMIDE HYDROCHLORIDE 2 MG/1
2 CAPSULE ORAL 4 TIMES DAILY
Refills: 0
Start: 2019-05-23 | End: 2019-06-02

## 2019-05-23 RX ORDER — ONDANSETRON 4 MG/1
4 TABLET, FILM COATED ORAL EVERY 6 HOURS
Status: DISCONTINUED | OUTPATIENT
Start: 2019-05-23 | End: 2019-05-23 | Stop reason: HOSPADM

## 2019-05-23 RX ORDER — OCTREOTIDE ACETATE 100 UG/ML
100 INJECTION, SOLUTION INTRAVENOUS; SUBCUTANEOUS EVERY 8 HOURS
Status: DISCONTINUED | OUTPATIENT
Start: 2019-05-23 | End: 2019-05-23 | Stop reason: HOSPADM

## 2019-05-23 RX ORDER — CIPROFLOXACIN 500 MG/1
500 TABLET ORAL EVERY 12 HOURS
Start: 2019-05-23

## 2019-05-23 RX ORDER — LOPERAMIDE HYDROCHLORIDE 2 MG/1
2 CAPSULE ORAL 4 TIMES DAILY
Status: DISCONTINUED | OUTPATIENT
Start: 2019-05-23 | End: 2019-05-23 | Stop reason: HOSPADM

## 2019-05-23 RX ORDER — LORAZEPAM 1 MG/1
1 TABLET ORAL NIGHTLY
Status: DISCONTINUED | OUTPATIENT
Start: 2019-05-23 | End: 2019-05-23 | Stop reason: HOSPADM

## 2019-05-23 RX ORDER — OCTREOTIDE ACETATE 100 UG/ML
100 INJECTION, SOLUTION INTRAVENOUS; SUBCUTANEOUS EVERY 8 HOURS
Qty: 90 ML | Refills: 11
Start: 2019-05-23 | End: 2020-05-22

## 2019-05-23 RX ADMIN — PIPERACILLIN AND TAZOBACTAM 4.5 G: 4; .5 INJECTION, POWDER, LYOPHILIZED, FOR SOLUTION INTRAVENOUS; PARENTERAL at 11:05

## 2019-05-23 RX ADMIN — LOPERAMIDE HYDROCHLORIDE 2 MG: 2 CAPSULE ORAL at 01:05

## 2019-05-23 RX ADMIN — DIPHENOXYLATE HYDROCHLORIDE AND ATROPINE SULFATE 1 TABLET: 2.5; .025 TABLET ORAL at 09:05

## 2019-05-23 RX ADMIN — PANCRELIPASE 2 CAPSULE: 24000; 76000; 120000 CAPSULE, DELAYED RELEASE PELLETS ORAL at 07:05

## 2019-05-23 RX ADMIN — LOPERAMIDE HYDROCHLORIDE 2 MG: 2 CAPSULE ORAL at 09:05

## 2019-05-23 RX ADMIN — PANCRELIPASE 2 CAPSULE: 24000; 76000; 120000 CAPSULE, DELAYED RELEASE PELLETS ORAL at 11:05

## 2019-05-23 RX ADMIN — DIPHENOXYLATE HYDROCHLORIDE AND ATROPINE SULFATE 1 TABLET: 2.5; .025 TABLET ORAL at 11:05

## 2019-05-23 RX ADMIN — PIPERACILLIN AND TAZOBACTAM 4.5 G: 4; .5 INJECTION, POWDER, LYOPHILIZED, FOR SOLUTION INTRAVENOUS; PARENTERAL at 01:05

## 2019-05-23 RX ADMIN — PANTOPRAZOLE SODIUM 40 MG: 40 TABLET, DELAYED RELEASE ORAL at 09:05

## 2019-05-23 RX ADMIN — OCTREOTIDE ACETATE 100 MCG: 100 INJECTION, SOLUTION INTRAVENOUS; SUBCUTANEOUS at 03:05

## 2019-05-23 RX ADMIN — PROMETHAZINE HYDROCHLORIDE 12.5 MG: 12.5 TABLET ORAL at 06:05

## 2019-05-23 RX ADMIN — HEPARIN 300 UNITS: 100 SYRINGE at 04:05

## 2019-05-23 RX ADMIN — ONDANSETRON 4 MG: 4 TABLET, FILM COATED ORAL at 11:05

## 2019-05-23 RX ADMIN — ENOXAPARIN SODIUM 60 MG: 100 INJECTION SUBCUTANEOUS at 01:05

## 2019-05-23 RX ADMIN — DIPHENOXYLATE HYDROCHLORIDE AND ATROPINE SULFATE 1 TABLET: 2.5; .025 TABLET ORAL at 03:05

## 2019-05-23 RX ADMIN — PROMETHAZINE HYDROCHLORIDE 12.5 MG: 12.5 TABLET ORAL at 03:05

## 2019-05-23 RX ADMIN — PROMETHAZINE HYDROCHLORIDE 12.5 MG: 12.5 TABLET ORAL at 11:05

## 2019-05-23 RX ADMIN — DEXAMETHASONE 2 MG: 0.5 TABLET ORAL at 07:05

## 2019-05-23 RX ADMIN — ONDANSETRON 4 MG: 4 TABLET, FILM COATED ORAL at 09:05

## 2019-05-23 NOTE — ASSESSMENT & PLAN NOTE
Impression: Pt is a 69 y/o female with pancreatic adenocarcinoma, DVTs, bilateral pleural effusions, severe diarrhea. Pt  Has weakness to legs from bilateral DVTs. Pt is alert, oriented to person, place, time, and situation. Pt appears weak and debilitated. No distress noted.     Reason for consult: Spoke to Dr. Farrell. [facility] care consulted to discuss hospice at home. Per MD, oncology managing symptoms.     Goals of care: Met with pt, pt's son, pt's daughter, and pt's niece. Per pt, her goal is to get home as soon as possible. Per pt, she does not want to be in hospital anymore. Per pt and family, they are interested in home hospice care. Hospice education done with pt and family. Pt and family would like home hospice care. Per family, they would like to use [facility] due to having friend who works there.     Hospice education provided. Pt and family verbalized understanding.  with    Called and spoke to Dr. Farrell that family would like to use [facility]. Called and spoke to ASHOK Duarte concerning discharge with home hospice.       Plan:   Pt to d/c with home hospice, [facility].   Support given.   Will follow.

## 2019-05-23 NOTE — PROGRESS NOTES
Road Test  Oxygen-Patient tolerating room air, no distress.  Ambulation-Patient up with no assistance.  Devices-Patient going home with no devices.  Tolerating-Regular diet.  Elimination-Patient voiding without difficulty.  Self Care-Performs self care without assistance.  Teaching-Verbal and written discharge teaching given.    Patient tolerates room air, ambulates with no assistance, regular diet, voiding without difficulty, and is going home with no devices. Peripheral IV removed, catheter intact, dressing dry gauze and coban. No bleeding present. Port a cath de-accesed, band aid applied. Patient going home. Discharge paperwork discussed and medications reviewed. Patient has all belongings and no questions at this time.

## 2019-05-23 NOTE — PT/OT/SLP PROGRESS
Physical Therapy      Patient Name:  Roopa Hanley   MRN:  [id number]    Patient not seen today secondary to planning to d/c today with hospice care  .     Riccardo Spear, PT

## 2019-05-23 NOTE — HPI
68 yr old with pancreatic adenocarcinoma cancer s/p 3 chem cycles, s/p Biliary obstruction- stent in place with previous known LLE DVT,who presented with chief c/o  multiple acute bilateral PE, fever, tachycardia, tachypnea, lethargy. Patient subsequently diagnosed with sepsis. Multiple possible sources; CT revealed emphysematous cystitis.  Patient is now hemodynamically stable. Palliative care consulted for hospice education per oncology team.

## 2019-05-23 NOTE — DISCHARGE SUMMARY
[facility]  Hematology/Oncology  Discharge Summary      Patient Name: Roopa Hanley  MRN: [id number]  Admission Date: 5/20/2019  Hospital Length of Stay: 3 days  Discharge Date and Time: No discharge date for patient encounter.  Attending Physician: Celestino Thompson MD   Discharging Provider: Elia Farrell DO  Primary Care Provider: Maikel Cesar MD    HPI: 68F with pancreatic cancer, on active chemo, and DVT on Eliquis complains of SOB, fever, and chills developing this morning on the way to a chemo session. She has had baseline nausea and weakness secondary to chemotherapy. She states she was having increasing shortness of breath starting this morning, then episodes of nausea and nonbloody, nonbilious emesis when getting into the car. Daughter notes chills and lethargy during the trip. She complains of nonproductive cough. Patient endorses diarrhea and abdominal pain unchanged from previous weeks. Chemotherapy last week had been deferred due to low WBC count. She denies chest pain, headache, LOC, blood in stool.     Previous history significant for pancreatic cancer diagnosed in 1/2019, currently on active chemo, with duodenal stenosis and biliary obstruction. She has a DVT in the LLE diagnosed in 4/2019 and has been on Eliquis. She also has hypertension.     In the ED, patient had a fever to 102 and was hypoxic requiring NC. Tachycardic to 120, RR 22, BP wnl. Lactic acid was 6.5 on ISTAT and 7.2 from the lab. Troponin wnl, . She was given vancomycin and zosyn as well as 1L NS and zofran. At time of my interview, patient's symptoms have significantly improved. MICU was consulted for sepsis and lactic acidosis.      Patient then went for CTA and was found to have extensive pulmonary embolisms as well as GGO in the left upper lobe.    Pt admitted to ICU 05/21 and stepped down to medical oncology service 05/22    Oncology History (per chart)   is a 69y/o lady with  recently diagnosed pancreatic cancer. She has hypertension.  She has history of flank/ back pain for over a year. She describes this as constant, achy, progressively worsening . This had impaired her ADLs and interrupted her sleep. She has recent weight loss--15lb since Dec 2018. She has decreased appetite. She had also noticed change in stool to light color. She had colonoscopy on 1/8/2019, suffered complications. She was told she had perforated an ulcer. She was admitted to [facility]  for 9 days. CT scan done with oral contrast via NG tube r/t poor renal function. She was discharged 1/16/19.   She was told she had a pancreatic mass. She was evaluated here by . She is still not eating well.      05/07/2019 She is still not eating well. She continues to have nausea, abdominal pain. She finished 1 cycle of FOLFIRINOX on 2/26/19. She had nausea, emesis and diarrhea after C1.C2 was on 3/26/19.She continued to have severe nausea, vomiting, diarrhea. Chemotherapy changed to Gemcitabine-Nab Paclitaxel; from 4/23/19.    * No surgery found *     Hospital Course: Pt admitted to ICU for septic shock 2/2 UTI and bilateral PE. Pt stepped down to Medical Oncology service 05/22. Pt started on anticoagulation with heparin gtt and transitioned to subq lovenox 60mg BID. Sepsis treated with Vancomycin and Zosyn initially. Bcx and urine cx with no growth, transitioned to oral ciprofloxacin. Per pt and family, she wishes not to pursue further aggressive therapies and instead focus on comfort. Palliative care consulted; pt wishes to discharge home with home hospice. Pt discharging home with [facility].     Consults:   Consults (From admission, onward)        Status Ordering Provider     Inpatient consult to Cardiology  Once     Provider:  (Not yet assigned)    Completed MARAH GAXIOLA     Inpatient consult to Critical Care Medicine  Once     Provider:  (Not yet assigned)    Completed MARAH GAXIOLA     Inpatient  consult to Hospice  Once     Provider:  (Not yet assigned)    Acknowledged ROSALBA SMITH     Inpatient consult to Palliative Care  Once     Provider:  (Not yet assigned)    Completed ROSALBA SMITH     Inpatient consult to Social Work  Once     Provider:  (Not yet assigned)    Completed YARELIS DESAI          Significant Diagnostic Studies: Labs:   CMP   Recent Labs   Lab 05/22/19  0400 05/23/19  0416    138   K 4.0 3.6   * 113*   CO2 20* 17*   * 86   BUN 21 17   CREATININE 0.9 0.8   CALCIUM 7.8* 7.9*   PROT 4.3* 4.5*   ALBUMIN 1.6* 1.7*   BILITOT 1.1* 1.0   ALKPHOS 150* 162*   AST 46* 60*   ALT 45* 50*   ANIONGAP 6* 8   ESTGFRAFRICA >60.0 >60.0   EGFRNONAA >60.0 >60.0   , CBC   Recent Labs   Lab 05/22/19  0300 05/22/19  1511 05/23/19  0416   WBC 4.49 5.80 4.90   HGB 7.5* 8.2* 7.8*   HCT 23.2* 24.7* 25.3*   PLT 91* 112* 107*   , INR   Lab Results   Component Value Date    INR 1.6 (H) 05/21/2019    INR 2.2 (H) 05/20/2019    INR 1.5 (H) 05/03/2019   , Troponin   Recent Labs   Lab 05/20/19  1011   TROPONINI 0.027*   , and All labs within the past 24 hours have been reviewed    Pending Diagnostic Studies:     None        Final Active Diagnoses:    Diagnosis Date Noted POA    PRINCIPAL PROBLEM:  Acute pulmonary embolism [I26.99] 05/20/2019 Yes    Palliative care encounter [Z51.5] 05/23/2019 Not Applicable    Goals of care, counseling/discussion [Z71.89]  Not Applicable    Emphysematous cystitis [N30.80] 05/21/2019 Yes    Sepsis [A41.9] 05/20/2019 Yes    Malignant neoplasm of pancreas [C25.9] 05/20/2019 Yes    Diarrhea [R19.7] 04/08/2019 Yes    Hypomagnesemia [E83.42] 03/26/2019 Yes    Hypokalemia [E87.6] 03/12/2019 Yes    Anemia in neoplastic disease [D63.0] 02/26/2019 Yes    Pancreatic adenocarcinoma [C25.9] 02/04/2019 Yes    Biliary obstruction [K83.1] 01/25/2019 Yes    Essential hypertension [I10] 01/18/2019 Yes      Problems Resolved During this Admission:       Discharged Condition: stable    Disposition: Hospice/Home    Follow Up:  Follow-up Information     Rigoberto Mcarthur MD.    Specialty:  Hematology and Oncology  Why:  As scheduled by the clinic  Contact information:  Eva MCDANIEL  [facility] [id number] [phone]                 Patient Instructions:   No discharge procedures on file.  Medications:  Reconciled Home Medications:      Medication List      START taking these medications    ciprofloxacin HCl 500 MG tablet  Commonly known as:  CIPRO  Take 1 tablet (500 mg total) by mouth every 12 (twelve) hours.     enoxaparin 60 mg/0.6 mL Syrg  Commonly known as:  LOVENOX  Inject 0.6 mLs (60 mg total) into the skin every 12 (twelve) hours.     octreotide 100 mcg/mL Soln  Commonly known as:  SANDOSTATIN  Inject 1 mL (100 mcg total) into the skin every 8 (eight) hours.        CHANGE how you take these medications    bisoprolol-hydrochlorothiazide 10-6.25 mg per tablet  Commonly known as:  ZIAC  Take 1 tablet by mouth 2 (two) times daily. Hold until PCP says to restart.  What changed:  additional instructions     hydrALAZINE 50 MG tablet  Commonly known as:  APRESOLINE  Take 1.5 tablets (75 mg total) by mouth 3 (three) times daily. Hold until PCP says to restart.  What changed:  additional instructions     lipase-protease-amylase 24,000-76,000-120,000 units 24,000-76,000 -120,000 unit capsule  Commonly known as:  CREON  Take 2 capsules by mouth 3 (three) times daily with meals.  What changed:  how much to take     loperamide 2 mg capsule  Commonly known as:  IMODIUM  Take 1 capsule (2 mg total) by mouth 4 (four) times daily. for 10 days  What changed:    · when to take this  · reasons to take this     * LORazepam 1 MG tablet  Commonly known as:  ATIVAN  Take 1 tablet (1 mg total) by mouth every 8 (eight) hours as needed for Anxiety.  What changed:  Another medication with the same name was added. Make sure you understand how and when to take each.     * LORazepam 1  MG tablet  Commonly known as:  ATIVAN  Take 1 tablet (1 mg total) by mouth every evening.  What changed:  You were already taking a medication with the same name, and this prescription was added. Make sure you understand how and when to take each.     morphine 15 MG tablet  Commonly known as:  MSIR  Take 2 tablets (30 mg total) by mouth every 4 (four) hours as needed for Pain.  What changed:  Another medication with the same name was removed. Continue taking this medication, and follow the directions you see here.     ondansetron 4 MG tablet  Commonly known as:  ZOFRAN  Take 1 tablet (4 mg total) by mouth every 6 (six) hours.  What changed:    · medication strength  · See the new instructions.     promethazine 12.5 MG Tab  Commonly known as:  PHENERGAN  Take 1 tablet (12.5 mg total) by mouth every 6 (six) hours.  What changed:    · medication strength  · See the new instructions.         * This list has 2 medication(s) that are the same as other medications prescribed for you. Read the directions carefully, and ask your doctor or other care provider to review them with you.            CONTINUE taking these medications    dexamethasone 2 MG tablet  Commonly known as:  DECADRON  Take 1 tablet (2 mg total) by mouth 2 (two) times daily with meals.     diphenoxylate-atropine 2.5-0.025 mg 2.5-0.025 mg per tablet  Commonly known as:  LOMOTIL  Take 1 tablet by mouth 4 (four) times daily as needed for Diarrhea.     dronabinol 5 MG capsule  Commonly known as:  MARINOL  Take 1 capsule (5 mg total) by mouth 2 (two) times daily before meals.     pantoprazole 40 MG tablet  Commonly known as:  PROTONIX  Take 1 tablet (40 mg total) by mouth once daily.     potassium chloride SA 20 MEQ tablet  Commonly known as:  K-DUR,KLOR-CON  Take 2 tablets (40 mEq total) by mouth 2 (two) times daily.        STOP taking these medications    apixaban 5 mg (74 tabs) Dspk  Commonly known as:  TASHA Farrell,    Hematology/Oncology  [facility]

## 2019-05-23 NOTE — PLAN OF CARE
Problem: Adult Inpatient Plan of Care  Goal: Plan of Care Review  Outcome: Ongoing (interventions implemented as appropriate)  Patient is AAOx4, up with 1 person assistance, fall precautions maintained.Patient reports nausea, PRN  phenergan given x1 dose. Denies pain. Voids per hat in BR. Up w standby assist. Zosyn given as ordered. Family at bedside.  Patient remained free from falls and or injury this shift. Bed locked in lowest position. Side rails up X 2. Call light within reach. Will continue to monitor

## 2019-05-23 NOTE — PT/OT/SLP EVAL
Occupational Therapy   Evaluation    Name: Roopa Hanley  MRN: [id number]  Admitting Diagnosis:  Acute pulmonary embolism      Recommendations:     Discharge Recommendations: home health OT  Discharge Equipment Recommendations:  commode  Barriers to discharge:  None    Assessment:     Roopa Hanley is a 68 y.o. female with a medical diagnosis of Acute pulmonary embolism.  She is agreeable to OT evaluation. Performance deficits affecting function: weakness, impaired endurance, impaired self care skills, impaired functional mobilty, gait instability, impaired balance, decreased upper extremity function, decreased lower extremity function, decreased coordination, impaired cardiopulmonary response to activity. Patient has good family support in providing assistance with ADLs/IADLs.     Rehab Prognosis: Good; patient would benefit from acute skilled OT services to address these deficits and reach maximum level of function.       Plan:     Patient to be seen 3 x/week to address the above listed problems via self-care/home management, therapeutic activities, therapeutic exercises  · Plan of Care Expires: 06/23/19  · Plan of Care Reviewed with: patient, daughter    Subjective     Chief Complaint: Decreased endurance/SOB with pulmonary embolism  Patient/Family Comments/goals: To go home    Occupational Profile:  Living Environment: Patient lives in 2SH, 1 CORINE no HR, c bedroom & bathroom downstairs. Bathroom has tub/shower. Patient lives with daughter, son-in-law, and 2 grandkids (ages 18 & 5) and daughter helps with ADLs and IADLs. Daughter reports they have a caregiver when she is not home. Patient is no longer driving.  Previous level of function: Mod to max A with ADLs/IADLs per patient & daughter report  Roles and Routines: Mother, grandmother, home dweller  Equipment Used at Home:  shower chair, rollator, walker, rolling, wheelchair, other (see comments)(Daughter reports they will be installing grab  bars)  Assistance upon Discharge: Daughter and caregiver available to assist    Pain/Comfort:  · Pain Rating 1: 0/10    Patients cultural, spiritual, Oriental orthodox conflicts given the current situation: no    Objective:     Communicated with: RN prior to session.  Patient found HOB elevated with peripheral IV upon OT entry to room.    General Precautions: Standard, fall   Orthopedic Precautions:N/A   Braces: N/A     Occupational Performance:    Bed Mobility:    · Patient completed Scooting to EOB with stand by assistance  · Patient completed Supine to Sit with minimum assistance for trunk advancement off bed    Functional Mobility/Transfers:  · Patient completed Sit <> Stand Transfer with minimum assistance  with  rolling walker   · Patient completed Toilet Transfer Stand Pivot technique with contact guard assistance with  rolling walker  · Functional Mobility: Patient ambulated approximately 16' from bed>toilet in restroom>bedside chair with CGA and RW    Activities of Daily Living:  · Bathing: maximal assistance simulated bathing with wipes for LB while sitting on the commode  · Lower Body Dressing: total assistance Donning/doffing socks while sitting on commode  · Toileting: maximal assistance with hygiene. Upon standing, patient had uncontrolled BM likely due to chemo. Patient ambulated to toilet in restroom and had another BM while seated on the toilet. Patient required assistance with hygiene.    Cognitive/Visual Perceptual:  Cognitive/Psychosocial Skills:     -       Oriented to: Person, Place, Time and Situation   -       Safety awareness/insight to disability: intact     Physical Exam:  Upper Extremity Range of Motion:     -       Right Upper Extremity: WFL  -       Left Upper Extremity: WFL  Upper Extremity Strength:    -       Right Upper Extremity: WFL except 3+/5 shoulder flex/ext, 3+/5 elbow flex/ext  -       Left Upper Extremity: WFL except 3+/5 shoulder flex/ext, 3+/5 elbow flex/ext   Strength:    -        Right Upper Extremity: WFL  -       Left Upper Extremity: WFL  Fine Motor Coordination:  Intact    AMPAC 6 Click ADL:  AMPAC Total Score: 16    Treatment & Education:  Role of OT and OT POC  Education:    Patient left up in chair with all lines intact, call button in reach and daughter present    GOALS:   Multidisciplinary Problems     Occupational Therapy Goals        Problem: Occupational Therapy Goal    Goal Priority Disciplines Outcome Interventions   Occupational Therapy Goal     OT, PT/OT Ongoing (interventions implemented as appropriate)                    History:     Past Medical History:   Diagnosis Date    Biliary obstruction     Duodenal stenosis     Hypertension     Jaundice        Past Surgical History:   Procedure Laterality Date    EGD (ESOPHAGOGASTRODUODENOSCOPY) N/A 2/1/2019    Performed by Celestino Christianson MD at [facility] ENDO (2ND FLR)    EGD (ESOPHAGOGASTRODUODENOSCOPY) N/A 1/25/2019    Performed by Rashawn Martinez MD at [facility] ENDO (2ND FLR)    ERCP (ENDOSCOPIC RETROGRADE CHOLANGIOPANCREATOGRAPHY) N/A 1/25/2019    Performed by Rashawn Martinez MD at [facility] ENDO (2ND FLR)    FOOT SURGERY Right 2012    HYSTERECTOMY      partial    [id number]-M-CATH N/A 2/22/2019    Performed by [facility] Diagnostic Provider at [facility] OR 2ND FLR    ULTRASOUND, UPPER GI TRACT, ENDOSCOPIC N/A 2/1/2019    Performed by Celestino Christianson MD at [facility] ENDO (2ND FLR)    ULTRASOUND, UPPER GI TRACT, ENDOSCOPIC N/A 1/25/2019    Performed by Rashawn Martinez MD at [facility] ENDO (2ND FLR)       Time Tracking:     OT Date of Treatment: 05/23/19  OT Start Time: 0848  OT Stop Time: 0915  OT Total Time (min): 27 min    Billable Minutes:Evaluation 15 mins  Self Care/Home Management 12 mins    Ladi Iqbal OT  5/23/2019

## 2019-05-23 NOTE — HOSPITAL COURSE
Pt admitted to ICU for septic shock 2/2 UTI and bilateral PE. Pt stepped down to Medical Oncology service 05/22. Pt started on anticoagulation with heparin gtt and transitioned to subq lovenox 60mg BID. Sepsis treated with Vancomycin and Zosyn initially. Bcx and urine cx with no growth, transitioned to oral ciprofloxacin. Per pt and family, she wishes not to pursue further aggressive therapies and instead focus on comfort. Palliative care consulted; pt wishes to discharge home with home hospice. Pt discharging home with [facility].

## 2019-05-23 NOTE — DISCHARGE INSTRUCTIONS
:  [facility], [phone], to provide home hospice services beginning with the nurse admission assessment visit. Hospice staff to order comfort care medications and medical equipment as needed.  OFE Davis, LCSW  (for Gertrude Whittaker, [facility])  [phone]

## 2019-05-23 NOTE — CONSULTS
Spoke to Dr Farrell concerning consult. Will see pt/family for hospice education.     Nancy MABRY, APRN, AGCNS

## 2019-05-23 NOTE — PLAN OF CARE
[facility]  Department of Hospital Medicine  [address] [place] [address]  [phone] after hours  [phone] fax    HOSPICE  ORDERS    05/23/2019    Admit to Hospice:  Home Service    Diagnoses:   Active Hospital Problems    Diagnosis  POA    *Acute pulmonary embolism [I26.99]  Yes    Emphysematous cystitis [N30.80]  Yes    Sepsis [A41.9]  Yes    Malignant neoplasm of pancreas [C25.9]  Yes    Diarrhea [R19.7]  Yes    Hypomagnesemia [E83.42]  Yes    Hypokalemia [E87.6]  Yes    Anemia in neoplastic disease [D63.0]  Yes    Pancreatic adenocarcinoma [C25.9]  Yes    Biliary obstruction [K83.1]  Yes    Essential hypertension [I10]  Yes      Resolved Hospital Problems   No resolved problems to display.       Hospice Qualifying Diagnoses:        Patient has a life expectancy < 6 months due to:  1) Primary Hospice Diagnosis:  Pancreatic Adenocarcinoma     2) Comorbid Conditions Contributing to Decline:  DVT, Bilateral Pulmonary embolism, Severe Diarrhea    Vital Signs: Routine per Hospice Protocol.    Code Status: DNR    Allergies: Review of patient's allergies indicates:  No Known Allergies    Diet: Adult Regular    Activities: As tolerated    Nursing: Per Hospice Routine.    Oxygen: Room Air    Other Miscellaneous Care:n/a    Medications:      Roopa Hanley Zully   Home Medication Instructions ROD:[id number]    Printed on:05/23/19 8899   Medication Information                      bisoprolol-hydrochlorothiazide (ZIAC) 10-6.25 mg per tablet  Take 1 tablet by mouth 2 (two) times daily. Hold until PCP says to restart.             ciprofloxacin HCl (CIPRO) 500 MG tablet  Take 1 tablet (500 mg total) by mouth every 12 (twelve) hours.             dexamethasone (DECADRON) 2 MG tablet  Take 1 tablet (2 mg total) by mouth 2 (two) times daily with meals.             diphenoxylate-atropine 2.5-0.025 mg (LOMOTIL) 2.5-0.025 mg per tablet  Take 1 tablet by mouth 4  (four) times daily as needed for Diarrhea.             dronabinol (MARINOL) 5 MG capsule  Take 1 capsule (5 mg total) by mouth 2 (two) times daily before meals.             enoxaparin (LOVENOX) 60 mg/0.6 mL Syrg  Inject 0.6 mLs (60 mg total) into the skin every 12 (twelve) hours.             hydrALAZINE (APRESOLINE) 50 MG tablet  Take 1.5 tablets (75 mg total) by mouth 3 (three) times daily. Hold until PCP says to restart.             lipase-protease-amylase 24,000-76,000-120,000 units (CREON) 24,000-76,000 -120,000 unit capsule  Take 2 capsules by mouth 3 (three) times daily with meals.             loperamide (IMODIUM) 2 mg capsule  Take 1 capsule (2 mg total) by mouth 4 (four) times daily. for 10 days             LORazepam (ATIVAN) 1 MG tablet  Take 1 tablet (1 mg total) by mouth every 8 (eight) hours as needed for Anxiety.             LORazepam (ATIVAN) 1 MG tablet  Take 1 tablet (1 mg total) by mouth every evening.             morphine (MSIR) 15 MG tablet  Take 2 tablets (30 mg total) by mouth every 4 (four) hours as needed for Pain.             octreotide (SANDOSTATIN) 100 mcg/mL Soln  Inject 1 mL (100 mcg total) into the skin every 8 (eight) hours.             ondansetron (ZOFRAN) 4 MG tablet  Take 1 tablet (4 mg total) by mouth every 6 (six) hours.             pantoprazole (PROTONIX) 40 MG tablet  Take 1 tablet (40 mg total) by mouth once daily.             potassium chloride SA (K-DUR,KLOR-CON) 20 MEQ tablet  Take 2 tablets (40 mEq total) by mouth 2 (two) times daily.             promethazine (PHENERGAN) 12.5 MG Tab  Take 1 tablet (12.5 mg total) by mouth every 6 (six) hours.                 Future Orders:  Hospice Medical Director may dictate new orders for comfortable care measures & sign death certificate.        _________________________________  Elia Farrell DO  05/23/2019

## 2019-05-23 NOTE — PLAN OF CARE
Problem: Occupational Therapy Goal  Goal: Occupational Therapy Goal  Outcome: Ongoing (interventions implemented as appropriate)  Goals to be met by: 5/30/2019    Patient will increase functional independence with ADLs by performing:    LE Dressing with Moderate Assistance.  Grooming while standing at sink with Contact Guard Assistance.  Toileting from toilet or BSC with Moderate Assistance for hygiene and clothing management.   Sit<>Stand transfers with Contact Guard Assistance and AD as needed.  Increase dynamic standing activity tolerance >5 minutes for OOB ADLs.        Comments: OT eric and OT POC established 5/23/2019

## 2019-05-23 NOTE — CONSULTS
[facility]  Palliative Medicine  Consult Note    Patient Name: Roopa Hanley  MRN: [id number]  Admission Date: 5/20/2019  Hospital Length of Stay: 3 days  Code Status: Full Code   Attending Provider: Celestino Thompson MD  Consulting Provider: ANGELLA Spencer  Primary Care Physician: Maikel Cesar MD  Principal Problem:Acute pulmonary embolism    Patient information was obtained from patient and ER records.      Consults  Assessment/Plan:     Palliative care encounter  Impression: Pt is a 67 y/o female with pancreatic adenocarcinoma, DVTs, bilateral pleural effusions, severe diarrhea. Pt  Has weakness to legs from bilateral DVTs. Pt is alert, oriented to person, place, time, and situation. Pt appears weak and debilitated. No distress noted.     Reason for consult: Spoke to Dr. Farrell. [facility] care consulted to discuss hospice at home. Per MD, oncology managing symptoms.     Goals of care: Met with pt, pt's son, pt's daughter, and pt's niece. Per pt, her goal is to get home as soon as possible. Per pt, she does not want to be in hospital anymore. Per pt and family, they are interested in home hospice care. Hospice education done with pt and family. Pt and family would like home hospice care. Per family, they would like to use [facility] due to having friend who works there.     Hospice education provided. Pt and family verbalized understanding.  with    Called and spoke to Dr. Farrell that family would like to use [facility] home [facility]. Called and spoke to ASHOK Duarte concerning discharge with home hospice.       Plan:   Pt to d/c with home hospice, [facility].   Support given.   Will follow.         Thank you for your consult. I will follow-up with patient. Please contact us if you have any additional questions.    Subjective:     HPI:     68 yr old with pancreatic adenocarcinoma cancer s/p 3 chem cycles, s/p Biliary obstruction- stent in place with previous known LLE DVT,who  presented with chief c/o  multiple acute bilateral PE, fever, tachycardia, tachypnea, lethargy. Patient subsequently diagnosed with sepsis. Multiple possible sources; CT revealed emphysematous cystitis.  Patient is now hemodynamically stable. Palliative care consulted for hospice education per oncology team.    Hospital Course:  No notes on file    Interval History: Pt to d/c home with hospice.     Past Medical History:   Diagnosis Date    Biliary obstruction     Duodenal stenosis     Hypertension     Jaundice        Past Surgical History:   Procedure Laterality Date    EGD (ESOPHAGOGASTRODUODENOSCOPY) N/A 2/1/2019    Performed by Celestino Christianson MD at [facility] ENDO (2ND FLR)    EGD (ESOPHAGOGASTRODUODENOSCOPY) N/A 1/25/2019    Performed by Rashawn Martinez MD at [facility] ENDO (2ND FLR)    ERCP (ENDOSCOPIC RETROGRADE CHOLANGIOPANCREATOGRAPHY) N/A 1/25/2019    Performed by Rashawn Martinez MD at [facility] ENDO (2ND FLR)    FOOT SURGERY Right 2012    HYSTERECTOMY      partial    [id number]-H-CATH N/A 2/22/2019    Performed by [facility] Diagnostic Provider at [facility] OR 2ND FLR    ULTRASOUND, UPPER GI TRACT, ENDOSCOPIC N/A 2/1/2019    Performed by Celestino Christianson MD at [facility] ENDO (2ND FLR)    ULTRASOUND, UPPER GI TRACT, ENDOSCOPIC N/A 1/25/2019    Performed by Rashawn [facility] MD at [facility] (2ND FLR)       Review of patient's allergies indicates:  No Known Allergies    Medications:  Continuous Infusions:  Scheduled Meds:   ciprofloxacin HCl  500 mg Oral Q12H    dexamethasone  2 mg Oral BID WM    enoxaparin  1 mg/kg Subcutaneous Q12H    lipase-protease-amylase 24,000-76,000-120,000 units  2 capsule Oral TID WM    loperamide  2 mg Oral QID    LORazepam  1 mg Oral QHS    octreotide  100 mcg Subcutaneous Q8H    ondansetron  4 mg Oral Q6H    pantoprazole  40 mg Oral Daily    promethazine  12.5 mg Oral Q6H     PRN Meds:sodium chloride, diphenoxylate-atropine 2.5-0.025 mg, heparin, porcine (PF), LORazepam,  oxyCODONE-acetaminophen, oxyCODONE-acetaminophen, sodium chloride 0.9%    Family History     Problem Relation (Age of Onset)    Cancer Paternal Aunt, Paternal Uncle, Cousin    Heart disease Mother    Stroke Father        Tobacco Use    Smoking status: Never Smoker    Smokeless tobacco: Never Used   Substance and Sexual Activity    Alcohol use: No     Frequency: Never    Drug use: No    Sexual activity: Not on file       Review of Systems   Constitutional: Positive for activity change and fatigue.   Respiratory: Negative for shortness of breath.    Musculoskeletal: Positive for gait problem.   Neurological: Positive for weakness.   Psychiatric/Behavioral: Negative.      Objective:     Vital Signs (Most Recent):  Temp: 98.7 °F (37.1 °C) (05/23/19 1142)  Pulse: 86 (05/23/19 1142)  Resp: 16 (05/23/19 1142)  BP: (!) 169/91 (05/23/19 1142)  SpO2: 96 % (05/23/19 1142) Vital Signs (24h Range):  Temp:  [97.4 °F (36.3 °C)-98.7 °F (37.1 °C)] 98.7 °F (37.1 °C)  Pulse:  [75-98] 86  Resp:  [16-18] 16  SpO2:  [94 %-97 %] 96 %  BP: (135-169)/(75-91) 169/91     Weight: 63.5 kg (140 lb)  Body mass index is 24.8 kg/m².    Review of Symptoms  Symptom Assessment (ESAS 0-10 scale)   ESAS 0 1 2 3 4 5 6 7 8 9 10   Pain X             Dyspnea X             Anxiety              Nausea  X            Depression               Anorexia              Fatigue      X        Insomnia              Restlessness               Agitation              CAM / Delirium __ --  ___+   Constipation     __ --  ___+   Diarrhea           __ --  ___+  Bowel Management Plan (BMP): No    Pain Assessment: pt reports she is comfortable a this time    ECOG Performance Status Grade: 3 - Confined to bed or chair 50% of waking hours    Physical Exam   Constitutional: She is oriented to person, place, and time. She is cooperative.   Pulmonary/Chest: Effort normal.   Neurological: She is alert and oriented to person, place, and time.   Skin: Skin is warm and dry.        Significant Labs: All pertinent labs within the past 24 hours have been reviewed.  CBC:   Recent Labs   Lab 05/23/19 0416   WBC 4.90   HGB 7.8*   HCT 25.3*   MCV 96   *     BMP:  Recent Labs   Lab 05/23/19 0416   GLU 86      K 3.6   *   CO2 17*   BUN 17   CREATININE 0.8   CALCIUM 7.9*   MG 1.5*     LFT:  Lab Results   Component Value Date    AST 60 (H) 05/23/2019    ALKPHOS 162 (H) 05/23/2019    BILITOT 1.0 05/23/2019     Albumin:   Albumin   Date Value Ref Range Status   05/23/2019 1.7 (L) 3.5 - 5.2 g/dL Final     Protein:   Total Protein   Date Value Ref Range Status   05/23/2019 4.5 (L) 6.0 - 8.4 g/dL Final     Lactic acid:   Lab Results   Component Value Date    LACTATE 0.6 05/21/2019    LACTATE 0.7 05/21/2019       Significant Imaging: I have reviewed all pertinent imaging results/findings within the past 24 hours.    Advance Care Planning   Advanced Directives::  Living Will: No  LaPOST: No  Do Not Resuscitate Status: No  Medical Power of : Daughter and son are next of kin    Decision-Making Capacity: Patient answered questions, Family answered questions       Living Arrangements: Lives with family- daughter    Psychosocial/Cultural:  Pt lives with daughter. Pt has son and cousin who are also involved in pt's care.         > 50% of 70 min visit spent in chart review, face to face discussion of goals of care,  symptom assessment, coordination of care and emotional support.    Nancy Weir, CNS  Palliative Medicine  [facility]

## 2019-05-23 NOTE — PLAN OF CARE
CM informed by Dr. Patrick that patient will be discharging today and needs follow-up early next week in the clinic. Patient also needs repeat labs (CBC, CMP). CM sent this message to Dr. Mcarthur's clinic as high priority.    Kimi Chaudhry, RN, BSN, CM  [facility]  Nurse - Med Onc/Gyn Onc

## 2019-05-23 NOTE — SUBJECTIVE & OBJECTIVE
Interval History: Pt to d/c home with hospice.     Past Medical History:   Diagnosis Date    Biliary obstruction     Duodenal stenosis     Hypertension     Jaundice        Past Surgical History:   Procedure Laterality Date    EGD (ESOPHAGOGASTRODUODENOSCOPY) N/A 2/1/2019    Performed by Celestino Christianson MD at [facility] ENDO (2ND FLR)    EGD (ESOPHAGOGASTRODUODENOSCOPY) N/A 1/25/2019    Performed by Rashawn Martinez MD at [facility] ENDO (2ND FLR)    ERCP (ENDOSCOPIC RETROGRADE CHOLANGIOPANCREATOGRAPHY) N/A 1/25/2019    Performed by Rashawn Martinez MD at [facility] ENDO (2ND FLR)    FOOT SURGERY Right 2012    HYSTERECTOMY      partial    [id number]-CATH N/A 2/22/2019    Performed by [facility] Diagnostic Provider at [facility] OR 2ND FLR    ULTRASOUND, UPPER GI TRACT, ENDOSCOPIC N/A 2/1/2019    Performed by Celestino Christianson MD at [facility] ENDO (2ND FLR)    ULTRASOUND, UPPER GI TRACT, ENDOSCOPIC N/A 1/25/2019    Performed by Rashawn Martinez MD at [facility] ENDO (2ND FLR)       Review of patient's allergies indicates:  No Known Allergies    Medications:  Continuous Infusions:  Scheduled Meds:   ciprofloxacin HCl  500 mg Oral Q12H    dexamethasone  2 mg Oral BID WM    enoxaparin  1 mg/kg Subcutaneous Q12H    lipase-protease-amylase 24,000-76,000-120,000 units  2 capsule Oral TID WM    loperamide  2 mg Oral QID    LORazepam  1 mg Oral QHS    octreotide  100 mcg Subcutaneous Q8H    ondansetron  4 mg Oral Q6H    pantoprazole  40 mg Oral Daily    promethazine  12.5 mg Oral Q6H     PRN Meds:sodium chloride, diphenoxylate-atropine 2.5-0.025 mg, heparin, porcine (PF), LORazepam, oxyCODONE-acetaminophen, oxyCODONE-acetaminophen, sodium chloride 0.9%    Family History     Problem Relation (Age of Onset)    Cancer Paternal Aunt, Paternal Uncle, Cousin    Heart disease Mother    Stroke Father        Tobacco Use    Smoking status: Never Smoker    Smokeless tobacco: Never Used   Substance and Sexual Activity    Alcohol use: No     Frequency: Never     Drug use: No    Sexual activity: Not on file       Review of Systems   Constitutional: Positive for activity change and fatigue.   Respiratory: Negative for shortness of breath.    Musculoskeletal: Positive for gait problem.   Neurological: Positive for weakness.   Psychiatric/Behavioral: Negative.      Objective:     Vital Signs (Most Recent):  Temp: 98.7 °F (37.1 °C) (05/23/19 1142)  Pulse: 86 (05/23/19 1142)  Resp: 16 (05/23/19 1142)  BP: (!) 169/91 (05/23/19 1142)  SpO2: 96 % (05/23/19 1142) Vital Signs (24h Range):  Temp:  [97.4 °F (36.3 °C)-98.7 °F (37.1 °C)] 98.7 °F (37.1 °C)  Pulse:  [75-98] 86  Resp:  [16-18] 16  SpO2:  [94 %-97 %] 96 %  BP: (135-169)/(75-91) 169/91     Weight: 63.5 kg (140 lb)  Body mass index is 24.8 kg/m².    Review of Symptoms  Symptom Assessment (ESAS 0-10 scale)   ESAS 0 1 2 3 4 5 6 7 8 9 10   Pain X             Dyspnea X             Anxiety              Nausea  X            Depression               Anorexia              Fatigue      X        Insomnia              Restlessness               Agitation              CAM / Delirium __ --  ___+   Constipation     __ --  ___+   Diarrhea           __ --  ___+  Bowel Management Plan (BMP): No    Pain Assessment: pt reports she is comfortable a this time    ECOG Performance Status Grade: 3 - Confined to bed or chair 50% of waking hours    Physical Exam   Constitutional: She is oriented to person, place, and time. She is cooperative.   Pulmonary/Chest: Effort normal.   Neurological: She is alert and oriented to person, place, and time.   Skin: Skin is warm and dry.       Significant Labs: All pertinent labs within the past 24 hours have been reviewed.  CBC:   Recent Labs   Lab 05/23/19 0416   WBC 4.90   HGB 7.8*   HCT 25.3*   MCV 96   *     BMP:  Recent Labs   Lab 05/23/19 0416   GLU 86      K 3.6   *   CO2 17*   BUN 17   CREATININE 0.8   CALCIUM 7.9*   MG 1.5*     LFT:  Lab Results   Component Value Date    AST 60 (H)  05/23/2019    ALKPHOS 162 (H) 05/23/2019    BILITOT 1.0 05/23/2019     Albumin:   Albumin   Date Value Ref Range Status   05/23/2019 1.7 (L) 3.5 - 5.2 g/dL Final     Protein:   Total Protein   Date Value Ref Range Status   05/23/2019 4.5 (L) 6.0 - 8.4 g/dL Final     Lactic acid:   Lab Results   Component Value Date    LACTATE 0.6 05/21/2019    LACTATE 0.7 05/21/2019       Significant Imaging: I have reviewed all pertinent imaging results/findings within the past 24 hours.    Advance Care Planning   Advanced Directives::  Living Will: No  LaPOST: No  Do Not Resuscitate Status: No  Medical Power of : Daughter and son are next of kin    Decision-Making Capacity: Patient answered questions, Family answered questions       Living Arrangements: Lives with family- daughter    Psychosocial/Cultural:  Pt lives with daughter. Pt has son and cousin who are also involved in pt's care.

## 2019-05-23 NOTE — SUBJECTIVE & OBJECTIVE
Interval History: ***    Past Medical History:   Diagnosis Date    Biliary obstruction     Duodenal stenosis     Hypertension     Jaundice        Past Surgical History:   Procedure Laterality Date    EGD (ESOPHAGOGASTRODUODENOSCOPY) N/A 2/1/2019    Performed by Cel[facility]ino [facility]on MD at [facility] ENDO (2ND FLR)    EGD (ESOPHAGOGASTRODUODENOSCOPY) N/A 1/25/2019    Performed by Rashawn Martinez MD at [facility] ENDO (2ND FLR)    ERCP (ENDOSCOPIC RETROGRADE CHOLANGIOPANCREATOGRAPHY) N/A 1/25/2019    Performed by Rashawn Martinez MD at [facility] ENDO (2ND FLR)    FOOT SURGERY Right 2012    HYSTERECTOMY      partial    [id number]-V-CATH N/A 2/22/2019    Performed by [facility] Diagnostic Provider at [facility] OR 2ND FLR    ULTRASOUND, UPPER GI TRACT, ENDOSCOPIC N/A 2/1/2019    Performed by Celestino Christianson MD at [facility] ENDO (2ND FLR)    ULTRASOUND, UPPER GI TRACT, ENDOSCOPIC N/A 1/25/2019    Performed by Rashawn Martinez MD at [facility] (2ND FLR)       Review of patient's allergies indicates:  No Known Allergies    Medications:  Continuous Infusions:  Scheduled Meds:   ciprofloxacin HCl  500 mg Oral Q12H    dexamethasone  2 mg Oral BID WM    enoxaparin  1 mg/kg Subcutaneous Q12H    lipase-protease-amylase 24,000-76,000-120,000 units  2 capsule Oral TID WM    loperamide  2 mg Oral QID    LORazepam  1 mg Oral QHS    octreotide  100 mcg Subcutaneous Q8H    ondansetron  4 mg Oral Q6H    pantoprazole  40 mg Oral Daily    promethazine  12.5 mg Oral Q6H     PRN Meds:sodium chloride, diphenoxylate-atropine 2.5-0.025 mg, heparin, porcine (PF), LORazepam, oxyCODONE-acetaminophen, oxyCODONE-acetaminophen, sodium chloride 0.9%    Family History     Problem Relation (Age of Onset)    Cancer Paternal Aunt, Paternal Uncle, Cousin    Heart disease Mother    Stroke Father        Tobacco Use    Smoking status: Never Smoker    Smokeless tobacco: Never Used   Substance and Sexual Activity    Alcohol use: No     Frequency: Never    Drug use: No     Sexual activity: Not on file       Review of Systems  Objective:     Vital Signs (Most Recent):  Temp: 98.7 °F (37.1 °C) (19 1142)  Pulse: 86 (19 1142)  Resp: 16 (19 1142)  BP: (!) 169/91 (19 1142)  SpO2: 96 % (19 1142) Vital Signs (24h Range):  Temp:  [97.4 °F (36.3 °C)-98.7 °F (37.1 °C)] 98.7 °F (37.1 °C)  Pulse:  [75-98] 86  Resp:  [16-18] 16  SpO2:  [94 %-97 %] 96 %  BP: (135-169)/(75-91) 169/91     Weight: 63.5 kg (140 lb)  Body mass index is 24.8 kg/m².    Review of Symptoms  Symptom Assessment (ESAS 0-10 scale)   ESAS 0 1 2 3 4 5 6 7 8 9 10   Pain              Dyspnea              Anxiety              Nausea              Depression               Anorexia              Fatigue              Insomnia              Restlessness               Agitation              CAM / Delirium __ --  ___+   Constipation     __ --  ___+   Diarrhea           __ --  ___+  Bowel Management Plan (BMP): {YES,NO:04025}    Comments: ***    Pain Assessment: {Desc; pain:33786}    OME in 24 hours: ***    Performance Status: {PPS:66707}    ECOG Performance Status Grade: {ECO}    Physical Exam    Significant Labs: {Results:49460}  CBC:   Recent Labs   Lab 19  041   WBC 4.90   HGB 7.8*   HCT 25.3*   MCV 96   *     BMP:  Recent Labs   Lab 19   GLU 86      K 3.6   *   CO2 17*   BUN 17   CREATININE 0.8   CALCIUM 7.9*   MG 1.5*     LFT:  Lab Results   Component Value Date    AST 60 (H) 2019    ALKPHOS 162 (H) 2019    BILITOT 1.0 2019     Albumin:   Albumin   Date Value Ref Range Status   2019 1.7 (L) 3.5 - 5.2 g/dL Final     Protein:   Total Protein   Date Value Ref Range Status   2019 4.5 (L) 6.0 - 8.4 g/dL Final     Lactic acid:   Lab Results   Component Value Date    LACTATE 0.6 2019    LACTATE 0.7 2019       Significant Imaging: {Imaging Review:06803}    Advance Care Planning   Advanced Directives::  Living Will: {DESC; LIVING  WILL OHS:95127}  LaPOST: {:63007}  Do Not Resuscitate Status: {:69187}  Medical Power of : {POA:75379}    Decision-Making Capacity: {Decision Makin}       Living Arrangements: {Living Arrangements:87521}    Psychosocial/Cultural:  Patient's most important priorities:  ***    Patient's biggest concerns/fears:  ***    Previous death/end of life care history:  ***    Patient's goals/hopes:  ***    Spiritual:     F- [place] and Belief: ***    I - Importance: ***  .  C - Community: ***    A - Address in Care: ***

## 2019-05-23 NOTE — DISCHARGE SUMMARY
[facility]  Hematology/Oncology  Discharge Summary      Patient Name: Roopa Hanley  MRN: [id number]  Admission Date: 5/20/2019  Hospital Length of Stay: 3 days  Discharge Date and Time:  05/23/2019 5:12 PM  Attending Physician: Celestino Thompson MD   Discharging Provider: Elia Farrell DO  Primary Care Provider: Maikel Cesar MD    HPI: 68F with pancreatic cancer, on active chemo, and DVT on Eliquis complains of SOB, fever, and chills developing this morning on the way to a chemo session. She has had baseline nausea and weakness secondary to chemotherapy. She states she was having increasing shortness of breath starting this morning, then episodes of nausea and nonbloody, nonbilious emesis when getting into the car. Daughter notes chills and lethargy during the trip. She complains of nonproductive cough. Patient endorses diarrhea and abdominal pain unchanged from previous weeks. Chemotherapy last week had been deferred due to low WBC count. She denies chest pain, headache, LOC, blood in stool.     Previous history significant for pancreatic cancer diagnosed in 1/2019, currently on active chemo, with duodenal stenosis and biliary obstruction. She has a DVT in the LLE diagnosed in 4/2019 and has been on Eliquis. She also has hypertension.     In the ED, patient had a fever to 102 and was hypoxic requiring NC. Tachycardic to 120, RR 22, BP wnl. Lactic acid was 6.5 on ISTAT and 7.2 from the lab. Troponin wnl, . She was given vancomycin and zosyn as well as 1L NS and zofran. At time of my interview, patient's symptoms have significantly improved. MICU was consulted for sepsis and lactic acidosis.      Patient then went for CTA and was found to have extensive pulmonary embolisms as well as GGO in the left upper lobe.    Pt admitted to ICU 05/21 and stepped down to medical oncology service 05/22    Oncology History (per chart)   is a 69y/o lady with recently diagnosed  pancreatic cancer. She has hypertension.  She has history of flank/ back pain for over a year. She describes this as constant, achy, progressively worsening . This had impaired her ADLs and interrupted her sleep. She has recent weight loss--15lb since Dec 2018. She has decreased appetite. She had also noticed change in stool to light color. She had colonoscopy on 1/8/2019, suffered complications. She was told she had perforated an ulcer. She was admitted to [facility]  for 9 days. CT scan done with oral contrast via NG tube r/t poor renal function. She was discharged 1/16/19.   She was told she had a pancreatic mass. She was evaluated here by . She is still not eating well.      05/07/2019 She is still not eating well. She continues to have nausea, abdominal pain. She finished 1 cycle of FOLFIRINOX on 2/26/19. She had nausea, emesis and diarrhea after C1.C2 was on 3/26/19.She continued to have severe nausea, vomiting, diarrhea. Chemotherapy changed to Gemcitabine-Nab Paclitaxel; from 4/23/19.    * No surgery found *     Hospital Course: Pt admitted to ICU for septic shock 2/2 UTI and bilateral PE. Pt stepped down to Medical Oncology service 05/22. Pt started on anticoagulation with heparin gtt and transitioned to subq lovenox 60mg BID. Sepsis treated with Vancomycin and Zosyn initially. Bcx and urine cx with no growth, transitioned to oral ciprofloxacin. Per pt and family, she wishes not to pursue further aggressive therapies and instead focus on comfort. Palliative care consulted; pt wishes to discharge home with home hospice. Pt discharging home with [facility].     Consults:   Consults (From admission, onward)        Status Ordering Provider     Inpatient consult to Cardiology  Once     Provider:  (Not yet assigned)    Completed MARAH GAXIOLA     Inpatient consult to Critical Care Medicine  Once     Provider:  (Not yet assigned)    Completed MARAH GAXIOLA     Inpatient consult to Hospice  Once      Provider:  (Not yet assigned)    Acknowledged ROSALBA SMITH     Inpatient consult to Palliative Care  Once     Provider:  (Not yet assigned)    Completed ROSALBA SMITH     Inpatient consult to Social Work  Once     Provider:  (Not yet assigned)    Completed YARELIS DESAI Diagnostic Studies: Labs:   CMP   Recent Labs   Lab 05/22/19  0400 05/23/19  0416    138   K 4.0 3.6   * 113*   CO2 20* 17*   * 86   BUN 21 17   CREATININE 0.9 0.8   CALCIUM 7.8* 7.9*   PROT 4.3* 4.5*   ALBUMIN 1.6* 1.7*   BILITOT 1.1* 1.0   ALKPHOS 150* 162*   AST 46* 60*   ALT 45* 50*   ANIONGAP 6* 8   ESTGFRAFRICA >60.0 >60.0   EGFRNONAA >60.0 >60.0   , CBC   Recent Labs   Lab 05/22/19  0300 05/22/19  1511 05/23/19  0416   WBC 4.49 5.80 4.90   HGB 7.5* 8.2* 7.8*   HCT 23.2* 24.7* 25.3*   PLT 91* 112* 107*   , INR   Lab Results   Component Value Date    INR 1.6 (H) 05/21/2019    INR 2.2 (H) 05/20/2019    INR 1.5 (H) 05/03/2019    and All labs within the past 24 hours have been reviewed    Pending Diagnostic Studies:     None        Final Active Diagnoses:    Diagnosis Date Noted POA    PRINCIPAL PROBLEM:  Acute pulmonary embolism [I26.99] 05/20/2019 Yes    Palliative care encounter [Z51.5] 05/23/2019 Not Applicable    Goals of care, counseling/discussion [Z71.89]  Not Applicable    Emphysematous cystitis [N30.80] 05/21/2019 Yes    Sepsis [A41.9] 05/20/2019 Yes    Malignant neoplasm of pancreas [C25.9] 05/20/2019 Yes    Diarrhea [R19.7] 04/08/2019 Yes    Hypomagnesemia [E83.42] 03/26/2019 Yes    Hypokalemia [E87.6] 03/12/2019 Yes    Anemia in neoplastic disease [D63.0] 02/26/2019 Yes    Pancreatic adenocarcinoma [C25.9] 02/04/2019 Yes    Biliary obstruction [K83.1] 01/25/2019 Yes    Essential hypertension [I10] 01/18/2019 Yes      Problems Resolved During this Admission:      Discharged Condition: stable    Disposition: Hospice/Home    Follow Up:  Follow-up Information      Rigoberto Mcarthur MD.    Specialty:  Hematology and Oncology  Why:  As scheduled by the clinic  Contact information:  [address]  [facility] [id number]  [phone]                 Patient Instructions:   No discharge procedures on file.  Medications:  Reconciled Home Medications:      Medication List      START taking these medications    ciprofloxacin HCl 500 MG tablet  Commonly known as:  CIPRO  Take 1 tablet (500 mg total) by mouth every 12 (twelve) hours.     enoxaparin 60 mg/0.6 mL Syrg  Commonly known as:  LOVENOX  Inject 0.6 mLs (60 mg total) into the skin every 12 (twelve) hours.     octreotide 100 mcg/mL Soln  Commonly known as:  SANDOSTATIN  Inject 1 mL (100 mcg total) into the skin every 8 (eight) hours.        CHANGE how you take these medications    bisoprolol-hydrochlorothiazide 10-6.25 mg per tablet  Commonly known as:  ZIAC  Take 1 tablet by mouth 2 (two) times daily. Hold until PCP says to restart.  What changed:  additional instructions     hydrALAZINE 50 MG tablet  Commonly known as:  APRESOLINE  Take 1.5 tablets (75 mg total) by mouth 3 (three) times daily. Hold until PCP says to restart.  What changed:  additional instructions     lipase-protease-amylase 24,000-76,000-120,000 units 24,000-76,000 -120,000 unit capsule  Commonly known as:  CREON  Take 2 capsules by mouth 3 (three) times daily with meals.  What changed:  how much to take     loperamide 2 mg capsule  Commonly known as:  IMODIUM  Take 1 capsule (2 mg total) by mouth 4 (four) times daily. for 10 days  What changed:    · when to take this  · reasons to take this     * LORazepam 1 MG tablet  Commonly known as:  ATIVAN  Take 1 tablet (1 mg total) by mouth every 8 (eight) hours as needed for Anxiety.  What changed:  Another medication with the same name was added. Make sure you understand how and when to take each.     * LORazepam 1 MG tablet  Commonly known as:  ATIVAN  Take 1 tablet (1 mg total) by mouth every evening.  What  changed:  You were already taking a medication with the same name, and this prescription was added. Make sure you understand how and when to take each.     morphine 15 MG tablet  Commonly known as:  MSIR  Take 2 tablets (30 mg total) by mouth every 4 (four) hours as needed for Pain.  What changed:  Another medication with the same name was removed. Continue taking this medication, and follow the directions you see here.     ondansetron 4 MG tablet  Commonly known as:  ZOFRAN  Take 1 tablet (4 mg total) by mouth every 6 (six) hours.  What changed:    · medication strength  · See the new instructions.     promethazine 12.5 MG Tab  Commonly known as:  PHENERGAN  Take 1 tablet (12.5 mg total) by mouth every 6 (six) hours.  What changed:    · medication strength  · See the new instructions.         * This list has 2 medication(s) that are the same as other medications prescribed for you. Read the directions carefully, and ask your doctor or other care provider to review them with you.            CONTINUE taking these medications    dexamethasone 2 MG tablet  Commonly known as:  DECADRON  Take 1 tablet (2 mg total) by mouth 2 (two) times daily with meals.     diphenoxylate-atropine 2.5-0.025 mg 2.5-0.025 mg per tablet  Commonly known as:  LOMOTIL  Take 1 tablet by mouth 4 (four) times daily as needed for Diarrhea.     dronabinol 5 MG capsule  Commonly known as:  MARINOL  Take 1 capsule (5 mg total) by mouth 2 (two) times daily before meals.     pantoprazole 40 MG tablet  Commonly known as:  PROTONIX  Take 1 tablet (40 mg total) by mouth once daily.     potassium chloride SA 20 MEQ tablet  Commonly known as:  K-DUR,KLOR-CON  Take 2 tablets (40 mEq total) by mouth 2 (two) times daily.        STOP taking these medications    apixaban 5 mg (74 tabs) Dspk  Commonly known as:  TASHA Farrell DO  Hematology/Oncology  [facility]    Attending Note  I have personally taken the history and  examined this patient and agree with the resident's note as stated above.  Lengthy discussion with patient and daughter and cousin  Prognosis reviewed  Toxicity of therapy reviewed  They are not inclined to continue therapy with impact on QOL  We discussed management of chronic nausea and diarrhea as above  Opts for Hospice

## 2019-05-24 ENCOUNTER — PATIENT MESSAGE (OUTPATIENT)
Dept: HEMATOLOGY/ONCOLOGY | Facility: CLINIC | Age: 69
End: 2019-05-24

## 2019-05-24 ENCOUNTER — DOCUMENTATION ONLY (OUTPATIENT)
Dept: HEMATOLOGY/ONCOLOGY | Facility: CLINIC | Age: 69
End: 2019-05-24

## 2019-05-24 NOTE — PROGRESS NOTES
Notified that the patient went home with hospice services in place in my absence yesterday. Kristina Ryan LCSW covering for discharge needs and arranging services through [facility].

## 2019-05-24 NOTE — PLAN OF CARE
On 5/24/19 at 0745: CM noted that the patient discharged home in the evening of 5/23/19 with home hospice. SW assisted the patient and the patient's family with home hospice arrangements. Patient setup with [facility]. No other discharge needs identified.     05/24/19 0834   Final Note   Assessment Type Final Discharge Note   Anticipated Discharge Disposition HospiceHome  ([facility])   What phone number can be called within the next 1-3 days to see how you are doing after discharge?   ([phone])   Hospital Follow Up  Appt(s) scheduled?   (N/A)   Discharge plans and expectations educations in teach back method with documentation complete?   (N/A)

## 2019-05-25 ENCOUNTER — PATIENT MESSAGE (OUTPATIENT)
Dept: ENDOSCOPY | Facility: HOSPITAL | Age: 69
End: 2019-05-25

## 2019-05-25 LAB
BACTERIA BLD CULT: NORMAL
BACTERIA BLD CULT: NORMAL

## 2019-06-02 NOTE — ED PROVIDER NOTES
Encounter Date: 5/3/2019       History     Chief Complaint   Patient presents with    Multiple complaints     on chemo pancreatic cancer, feeling weak, trouble walking due weakness, nvd, had fever last week     HPI  Review of patient's allergies indicates:  No Known Allergies  Past Medical History:   Diagnosis Date    Biliary obstruction     Duodenal stenosis     Hypertension     Jaundice      Past Surgical History:   Procedure Laterality Date    EGD (ESOPHAGOGASTRODUODENOSCOPY) N/A 2/1/2019    Performed by Celestino Christianson MD at [facility] ENDO (2ND FLR)    EGD (ESOPHAGOGASTRODUODENOSCOPY) N/A 1/25/2019    Performed by Rashawn Martinez MD at [facility] ENDO (2ND FLR)    ERCP (ENDOSCOPIC RETROGRADE CHOLANGIOPANCREATOGRAPHY) N/A 1/25/2019    Performed by Rashawn Martinez MD at [facility] ENDO (2ND FLR)    FOOT SURGERY Right 2012    HYSTERECTOMY      partial    HTDIMJVLF-ZRUJ-A-CATH N/A 2/22/2019    Performed by [facility] Provider at [facility] OR 2ND FLR    ULTRASOUND, UPPER GI TRACT, ENDOSCOPIC N/A 2/1/2019    Performed by Celestino Christianson MD at [facility] ENDO (2ND FLR)    ULTRASOUND, UPPER GI TRACT, ENDOSCOPIC N/A 1/25/2019    Performed by Rashawn Martinez MD at [facility] ENDO (2ND FLR)     Family History   Problem Relation Age of Onset    Heart disease Mother     Stroke Father     Cancer Paternal Aunt         ovarian    Cancer Paternal Uncle     Cancer Cousin         pancreatic     Social History     Tobacco Use    Smoking status: Never Smoker    Smokeless tobacco: Never Used   Substance Use Topics    Alcohol use: No     Frequency: Never    Drug use: No     Review of Systems    Physical Exam     Initial Vitals [05/03/19 1647]   BP Pulse Resp Temp SpO2   134/73 85 18 98.8 °F (37.1 °C) 100 %      MAP       --         Physical Exam    ED Course   Procedures  Labs Reviewed   PROTIME-INR - Abnormal; Notable for the following components:       Result Value    Prothrombin Time 15.1 (*)     INR 1.5 (*)     All other components within  normal limits   CBC W/ AUTO DIFFERENTIAL - Abnormal; Notable for the following components:    WBC 1.68 (*)     RBC 2.63 (*)     Hemoglobin 7.5 (*)     Hematocrit 22.5 (*)     RDW 17.2 (*)     Platelets 115 (*)     nRBC 10 (*)     Platelet Estimate Decreased (*)     All other components within normal limits    Narrative:     WBC critical result(s) called and verbal readback obtained from DAVE JOHNSON RN, 05/03/2019 21:01   COMPREHENSIVE METABOLIC PANEL - Abnormal; Notable for the following components:    Potassium 2.7 (*)     Glucose 133 (*)     Calcium 8.5 (*)     Total Protein 5.5 (*)     Albumin 2.1 (*)     Total Bilirubin 1.3 (*)     Alkaline Phosphatase 136 (*)     AST 73 (*)     ALT 55 (*)     eGFR if non  58.8 (*)     All other components within normal limits   MAGNESIUM - Abnormal; Notable for the following components:    Magnesium 1.2 (*)     All other components within normal limits   BASIC METABOLIC PANEL - Abnormal; Notable for the following components:    Potassium 2.9 (*)     Glucose 115 (*)     Calcium 8.3 (*)     eGFR if non  58.8 (*)     All other components within normal limits    Narrative:     Draw after administration of electrolytes   APTT   LACTIC ACID, PLASMA        ECG Results          EKG 12-lead (Final result)  Result time 05/04/19 17:55:27    Final result by Interface, Lab In [facility] (05/04/19 17:55:27)                 Narrative:    Test Reason : R53.81,R53.83,    Vent. Rate : 078 BPM     Atrial Rate : 078 BPM     P-R Int : 140 ms          QRS Dur : 084 ms      QT Int : 378 ms       P-R-T Axes : 056 030 116 degrees     QTc Int : 430 ms    Normal sinus rhythm  Nonspecific ST and T wave abnormality  Abnormal ECG  When compared with ECG of 25-APR-1994 13:38,  Premature supraventricular complexes are no longer Present  T wave amplitude has decreased in Anterior leads  Confirmed by Nahid Purcell MD ([id number]) on 5/4/2019 5:55:14 PM    Referred By: [email or web address]   SELF       "     Confirmed By:Nahid Purcell MD                            Imaging Results          X-Ray Chest PA And Lateral (Final result)  Result time 05/03/19 20:19:32    Final result by Yaron Cade MD (05/03/19 20:19:32)                 Impression:      No acute cardiopulmonary finding or detrimental change when compared with 04/13/2019.      Electronically signed by: Yaron Cade MD  Date:    05/03/2019  Time:    20:19             Narrative:    EXAMINATION:  XR CHEST PA AND LATERAL    CLINICAL HISTORY:  Provided history is "  Other fatigue".    TECHNIQUE:  Frontal and lateral views of the chest were performed.    COMPARISON:  04/13/2019.    FINDINGS:  Cardiac wires overlie the chest.  Right-sided Port-A-Cath overlies the lower SVC.  Cardiac silhouette is not enlarged.  Right hemidiaphragm is elevated, similar to prior.  No focal consolidation.  No sizable pleural effusion.  No pneumothorax.  Partially visualized stent material in the upper abdomen.                                              Attending Attestation:   Physician Attestation Statement for Resident:  As the supervising MD   Physician Attestation Statement: I have personally seen and examined this patient.   I agree with the above history. -:   As the supervising MD I agree with the above PE.    As the supervising MD I agree with the above treatment, course, plan, and disposition.                       Clinical Impression:       ICD-10-CM ICD-9-CM   1. Electrolyte abnormality E87.8 276.9   2. Malaise and fatigue R53.81 780.79    R53.83    3. Fatigue R53.83 780.79         Disposition:   Disposition: Discharged  Condition: Stable                        Ghulam March DO  06/01/19 2006       Ghulam March DO  06/01/19 2008    "

## 2020-10-02 NOTE — PLAN OF CARE
----- Message from Surinder Jang MD sent at 9/30/2020  4:01 PM CDT -----  Thyroid function test is normal.  Hemoglobin A1c is 6.1--normal is less than 6--therefore patient has prediabetes.   He needs to be more aggressive with proper weight management th Problem: Adult Inpatient Plan of Care  Goal: Plan of Care Review  Outcome: Ongoing (interventions implemented as appropriate)  1740-Patient tolerated treatment well. Discharged without complaints or S/S of adverse event. AVS given.  Instructed to call provider for any questions or concerns.

## 2022-08-19 NOTE — DISCHARGE INSTRUCTIONS
For scheduling: Call Iqra at [phone]    For questions or concerns call: [facility] MON-FRI 8 AM- 5PM [phone]. Radiology resident on call [phone].    For immediate concerns that are not emergent, you may call our radiology clinic at: [phone]...  
No

## 2024-06-01 NOTE — TELEPHONE ENCOUNTER
Attempted to contact patient to schedule EGD. Left message   Pt via PV from home with c/o L groin pain since this AM.   Pt denies any trouble urinating.     Pt reporting he has been limping the last day due to foot pain.

## (undated) DEVICE — SEE MEDLINE ITEM 157117

## (undated) DEVICE — ELECTRODE REM PLYHSV RETURN 9

## (undated) DEVICE — SEE MEDLINE ITEM 157131

## (undated) DEVICE — SOL NACL 0.9% INJ PF/50151

## (undated) DEVICE — DRAPE THYROID WITH ARMBOARD

## (undated) DEVICE — DRAPE C ARM 42 X 120 10/BX

## (undated) DEVICE — SUT PROLENE 0 MO6 30IN BLUE

## (undated) DEVICE — TRAY MINOR GEN SURG

## (undated) DEVICE — SUT VICRYL 3-0 27 SH

## (undated) DEVICE — SUT 3-0 12-18IN SILK

## (undated) DEVICE — GOWN SURG 2XL DISP TIE BACK

## (undated) DEVICE — SYR ONLY LUER LOCK 20CC

## (undated) DEVICE — SUT MCRYL PLUS 4-0 PS2 27IN

## (undated) DEVICE — SET DECANTER MEDICHOICE

## (undated) DEVICE — SYR DISP LL 5CC

## (undated) DEVICE — BLADE SURG CARBON STEEL SZ11

## (undated) DEVICE — CUP MEDICINE STERILE 2OZ